# Patient Record
Sex: MALE | Race: WHITE | NOT HISPANIC OR LATINO | Employment: UNEMPLOYED | URBAN - METROPOLITAN AREA
[De-identification: names, ages, dates, MRNs, and addresses within clinical notes are randomized per-mention and may not be internally consistent; named-entity substitution may affect disease eponyms.]

---

## 2018-08-28 ENCOUNTER — OFFICE VISIT (OUTPATIENT)
Dept: FAMILY MEDICINE CLINIC | Facility: CLINIC | Age: 48
End: 2018-08-28

## 2018-08-28 VITALS
HEART RATE: 98 BPM | WEIGHT: 235 LBS | RESPIRATION RATE: 16 BRPM | SYSTOLIC BLOOD PRESSURE: 122 MMHG | DIASTOLIC BLOOD PRESSURE: 72 MMHG | TEMPERATURE: 97.6 F | OXYGEN SATURATION: 96 % | HEIGHT: 71 IN | BODY MASS INDEX: 32.9 KG/M2

## 2018-08-28 DIAGNOSIS — J06.9 VIRAL URI WITH COUGH: Primary | ICD-10-CM

## 2018-08-28 PROCEDURE — 99213 OFFICE O/P EST LOW 20 MIN: CPT | Performed by: NURSE PRACTITIONER

## 2018-08-28 RX ORDER — GUAIFENESIN 600 MG/1
1200 TABLET, EXTENDED RELEASE ORAL 2 TIMES DAILY PRN
COMMUNITY
Start: 2018-08-28 | End: 2019-03-06

## 2018-08-28 NOTE — PROGRESS NOTES
"Chief Complaint   Patient presents with   • Shortness of Breath   • Wheezing   • Cough       Upper Respiratory Infection: Patient complains of symptoms of a URI. Symptoms include congestion and cough. Onset of symptoms was 2 days ago, gradually worsening since that time. He also c/o achiness and no  fever for the past 2 days .  He is drinking plenty of fluids. Evaluation to date: none. Treatment to date: none.  Ill contacts at home or school or work discussed.    The following portions of the patient's history were reviewed and updated as appropriate: allergies, current medications, past family history, past medical history, past social history, past surgical history and problem list.    A comprehensive review of systems was negative except for: Respiratory: positive for cough and wheezing    Vitals:    08/28/18 1419   BP: 122/72   BP Location: Left arm   Patient Position: Sitting   Cuff Size: Large Adult   Pulse: 98   Resp: 16   Temp: 97.6 °F (36.4 °C)   SpO2: 96%   Weight: 107 kg (235 lb)   Height: 180.3 cm (71\")     Gen: Mildly ill appearing, alert  Ears: TM's normal without redness  Nose:  No congestion  Throat:  Pink without exudate, some drainage, tonsils okay  Neck: No LAD  Lung: Good air movement, regular RR  Heart: RR without murmur  Skin: No rash      Assessment/Plan   Simone was seen today for shortness of breath, wheezing and cough.    Diagnoses and all orders for this visit:    Viral URI with cough    Other orders  -     guaiFENesin (MUCINEX) 600 MG 12 hr tablet; Take 2 tablets by mouth 2 (Two) Times a Day As Needed for Cough or Congestion.             Patient Instructions   Upper Respiratory Infection, Adult  Most upper respiratory infections (URIs) are caused by a virus. A URI affects the nose, throat, and upper air passages. The most common type of URI is often called \"the common cold.\"  Follow these instructions at home:  · Take medicines only as told by your doctor.  · Gargle warm saltwater or " take cough drops to comfort your throat as told by your doctor.  · Use a warm mist humidifier or inhale steam from a shower to increase air moisture. This may make it easier to breathe.  · Drink enough fluid to keep your pee (urine) clear or pale yellow.  · Eat soups and other clear broths.  · Have a healthy diet.  · Rest as needed.  · Go back to work when your fever is gone or your doctor says it is okay.  ? You may need to stay home longer to avoid giving your URI to others.  ? You can also wear a face mask and wash your hands often to prevent spread of the virus.  · Use your inhaler more if you have asthma.  · Do not use any tobacco products, including cigarettes, chewing tobacco, or electronic cigarettes. If you need help quitting, ask your doctor.  Contact a doctor if:  · You are getting worse, not better.  · Your symptoms are not helped by medicine.  · You have chills.  · You are getting more short of breath.  · You have brown or red mucus.  · You have yellow or brown discharge from your nose.  · You have pain in your face, especially when you bend forward.  · You have a fever.  · You have puffy (swollen) neck glands.  · You have pain while swallowing.  · You have white areas in the back of your throat.  Get help right away if:  · You have very bad or constant:  ? Headache.  ? Ear pain.  ? Pain in your forehead, behind your eyes, and over your cheekbones (sinus pain).  ? Chest pain.  · You have long-lasting (chronic) lung disease and any of the following:  ? Wheezing.  ? Long-lasting cough.  ? Coughing up blood.  ? A change in your usual mucus.  · You have a stiff neck.  · You have changes in your:  ? Vision.  ? Hearing.  ? Thinking.  ? Mood.  This information is not intended to replace advice given to you by your health care provider. Make sure you discuss any questions you have with your health care provider.  Document Released: 06/05/2009 Document Revised: 08/20/2017 Document Reviewed: 03/25/2015  [employer]  Interactive Patient Education © 2018 [employer]        Tylenol or Advil as needed for pain, fever, muscle aches  Plenty of fluids  Hand washing discussed  Off work or school note given if needed.  Warm tea for throat.  Pros and cons of antibiotic use discussed    KAT Trammell  Family Practice  [facility]

## 2018-08-28 NOTE — PATIENT INSTRUCTIONS

## 2019-03-06 ENCOUNTER — APPOINTMENT (OUTPATIENT)
Dept: GENERAL RADIOLOGY | Facility: HOSPITAL | Age: 49
End: 2019-03-06

## 2019-03-06 ENCOUNTER — HOSPITAL ENCOUNTER (EMERGENCY)
Facility: HOSPITAL | Age: 49
Discharge: HOME | End: 2019-03-06
Attending: EMERGENCY MEDICINE | Admitting: EMERGENCY MEDICINE

## 2019-03-06 VITALS
DIASTOLIC BLOOD PRESSURE: 92 MMHG | TEMPERATURE: 97.5 F | BODY MASS INDEX: 32.62 KG/M2 | OXYGEN SATURATION: 96 % | HEIGHT: 71 IN | HEART RATE: 98 BPM | WEIGHT: 233 LBS | RESPIRATION RATE: 16 BRPM | SYSTOLIC BLOOD PRESSURE: 153 MMHG

## 2019-03-06 DIAGNOSIS — R79.89 ELEVATED LFTS: ICD-10-CM

## 2019-03-06 DIAGNOSIS — F41.0 ANXIETY ATTACK: Primary | ICD-10-CM

## 2019-03-06 LAB
ALBUMIN SERPL-MCNC: 3.5 G/DL (ref 3.5–5.2)
ALBUMIN/GLOB SERPL: 0.9 G/DL
ALP SERPL-CCNC: 188 U/L (ref 40–129)
ALT SERPL W P-5'-P-CCNC: 49 U/L (ref 5–41)
ANION GAP SERPL CALCULATED.3IONS-SCNC: 14 MMOL/L
AST SERPL-CCNC: 120 U/L (ref 5–40)
BASOPHILS # BLD AUTO: 0.02 10*3/MM3 (ref 0–0.2)
BASOPHILS NFR BLD AUTO: 0.4 % (ref 0–1.5)
BILIRUB SERPL-MCNC: 1.9 MG/DL (ref 0.2–1.2)
BUN BLD-MCNC: 9 MG/DL (ref 6–20)
BUN/CREAT SERPL: 11.7 (ref 7–25)
CALCIUM SPEC-SCNC: 7.9 MG/DL (ref 8.6–10.5)
CHLORIDE SERPL-SCNC: 100 MMOL/L (ref 98–107)
CO2 SERPL-SCNC: 26 MMOL/L (ref 22–29)
CREAT BLD-MCNC: 0.77 MG/DL (ref 0.76–1.27)
DEPRECATED RDW RBC AUTO: 48.4 FL (ref 37–54)
EOSINOPHIL # BLD AUTO: 0.1 10*3/MM3 (ref 0–0.4)
EOSINOPHIL NFR BLD AUTO: 2.2 % (ref 0.3–6.2)
ERYTHROCYTE [DISTWIDTH] IN BLOOD BY AUTOMATED COUNT: 13.8 % (ref 12.3–15.4)
GFR SERPL CREATININE-BSD FRML MDRD: 107 ML/MIN/1.73
GLOBULIN UR ELPH-MCNC: 3.9 GM/DL
GLUCOSE BLD-MCNC: 103 MG/DL (ref 65–99)
HCT VFR BLD AUTO: 43.8 % (ref 37.5–51)
HGB BLD-MCNC: 15 G/DL (ref 13–17.7)
HOLD SPECIMEN: NORMAL
HOLD SPECIMEN: NORMAL
IMM GRANULOCYTES # BLD AUTO: 0.01 10*3/MM3 (ref 0–0.05)
IMM GRANULOCYTES NFR BLD AUTO: 0.2 % (ref 0–0.5)
LYMPHOCYTES # BLD AUTO: 2.83 10*3/MM3 (ref 0.7–3.1)
LYMPHOCYTES NFR BLD AUTO: 62.3 % (ref 19.6–45.3)
MCH RBC QN AUTO: 32.4 PG (ref 26.6–33)
MCHC RBC AUTO-ENTMCNC: 34.2 G/DL (ref 31.5–35.7)
MCV RBC AUTO: 94.6 FL (ref 79–97)
MONOCYTES # BLD AUTO: 0.39 10*3/MM3 (ref 0.1–0.9)
MONOCYTES NFR BLD AUTO: 8.6 % (ref 5–12)
NEUTROPHILS # BLD AUTO: 1.19 10*3/MM3 (ref 1.4–7)
NEUTROPHILS NFR BLD AUTO: 26.3 % (ref 42.7–76)
NRBC BLD AUTO-RTO: 0 /100 WBC (ref 0–0)
PLAT MORPH BLD: NORMAL
PLATELET # BLD AUTO: 94 10*3/MM3 (ref 140–450)
PMV BLD AUTO: 9.2 FL (ref 6–12)
POTASSIUM BLD-SCNC: 3.8 MMOL/L (ref 3.5–5.2)
PROT SERPL-MCNC: 7.4 G/DL (ref 6–8.5)
RBC # BLD AUTO: 4.63 10*6/MM3 (ref 4.14–5.8)
RBC MORPH BLD: NORMAL
SODIUM BLD-SCNC: 140 MMOL/L (ref 136–145)
TROPONIN T SERPL-MCNC: <0.01 NG/ML (ref 0–0.03)
TROPONIN T SERPL-MCNC: <0.01 NG/ML (ref 0–0.03)
WBC MORPH BLD: NORMAL
WBC NRBC COR # BLD: 4.54 10*3/MM3 (ref 3.4–10.8)
WHOLE BLOOD HOLD SPECIMEN: NORMAL
WHOLE BLOOD HOLD SPECIMEN: NORMAL

## 2019-03-06 PROCEDURE — 25010000002 LORAZEPAM PER 2 MG: Performed by: EMERGENCY MEDICINE

## 2019-03-06 PROCEDURE — 96374 THER/PROPH/DIAG INJ IV PUSH: CPT

## 2019-03-06 PROCEDURE — 85025 COMPLETE CBC W/AUTO DIFF WBC: CPT | Performed by: EMERGENCY MEDICINE

## 2019-03-06 PROCEDURE — 99284 EMERGENCY DEPT VISIT MOD MDM: CPT | Performed by: EMERGENCY MEDICINE

## 2019-03-06 PROCEDURE — 99284 EMERGENCY DEPT VISIT MOD MDM: CPT

## 2019-03-06 PROCEDURE — 93005 ELECTROCARDIOGRAM TRACING: CPT | Performed by: EMERGENCY MEDICINE

## 2019-03-06 PROCEDURE — 84484 ASSAY OF TROPONIN QUANT: CPT | Performed by: EMERGENCY MEDICINE

## 2019-03-06 PROCEDURE — 71046 X-RAY EXAM CHEST 2 VIEWS: CPT

## 2019-03-06 PROCEDURE — 80053 COMPREHEN METABOLIC PANEL: CPT | Performed by: EMERGENCY MEDICINE

## 2019-03-06 PROCEDURE — 93010 ELECTROCARDIOGRAM REPORT: CPT | Performed by: INTERNAL MEDICINE

## 2019-03-06 PROCEDURE — 85007 BL SMEAR W/DIFF WBC COUNT: CPT | Performed by: EMERGENCY MEDICINE

## 2019-03-06 RX ORDER — SODIUM CHLORIDE 0.9 % (FLUSH) 0.9 %
10 SYRINGE (ML) INJECTION AS NEEDED
Status: DISCONTINUED | OUTPATIENT
Start: 2019-03-06 | End: 2019-03-06 | Stop reason: HOSPADM

## 2019-03-06 RX ORDER — LORAZEPAM 2 MG/ML
1 INJECTION INTRAMUSCULAR ONCE
Status: COMPLETED | OUTPATIENT
Start: 2019-03-06 | End: 2019-03-06

## 2019-03-06 RX ORDER — HYDROXYZINE PAMOATE 50 MG/1
50 CAPSULE ORAL 4 TIMES DAILY PRN
Qty: 20 CAPSULE | Refills: 0 | Status: SHIPPED | OUTPATIENT
Start: 2019-03-06 | End: 2020-10-08

## 2019-03-06 RX ADMIN — LORAZEPAM 1 MG: 2 INJECTION INTRAMUSCULAR; INTRAVENOUS at 18:23

## 2019-03-06 NOTE — ED PROVIDER NOTES
Subjective   Mr. Simone Boykin is a 47 yo WM who presents secondary to shortness of breath.  Patient is a poor historian.  He has trouble getting specifics as to when he symptoms started.  He has been short of breath for several days perhaps more.  However symptoms worsened today.  Patient also complains of pain in his left jaw and left shoulder and numbness in his left arm.  Patient has had similar symptoms several times in the past.  He has been seen by physicians.  No etiology has been found.  Patient has chronic left shoulder pain due to a torn rotator cuff.  He states he should have had surgery 20 years ago.  Patient was seen by chiropractor 5 days ago for this left shoulder pain.  Patient presents for evaluation.    Patient does not smoke.  No history of hypertension, hypercholesterolemia or diabetes.  Patient does not drink alcohol or use drugs.        History provided by:  Patient (Mother provides some history.)  Shortness of Breath   Severity:  Moderate  Onset quality:  Unable to specify  Duration: Unable to specify but several days or longer.  Timing:  Constant  Progression:  Worsening  Chronicity:  Recurrent  Context comment:  As described above.  Relieved by:  None tried  Worsened by:  Nothing  Ineffective treatments:  None tried  Associated symptoms: no abdominal pain, no chest pain, no claudication, no cough, no diaphoresis, no ear pain, no fever, no headaches, no hemoptysis, no neck pain, no PND, no rash, no sore throat, no sputum production, no syncope, no swollen glands, no vomiting and no wheezing    Associated symptoms comment:  Patient denied chest pain to me.  Risk factors: obesity    Risk factors: no hx of cancer, no hx of PE/DVT, no recent surgery and no tobacco use        Review of Systems   Constitutional: Negative for diaphoresis and fever.   HENT: Negative for ear pain and sore throat.    Respiratory: Positive for shortness of breath. Negative for cough, hemoptysis, sputum production  and wheezing.    Cardiovascular: Negative for chest pain, claudication, syncope and PND.   Gastrointestinal: Negative for abdominal pain and vomiting.   Genitourinary: Negative for difficulty urinating.   Musculoskeletal: Positive for arthralgias (chronic shoulder pain). Negative for neck pain.   Skin: Negative for rash.   Neurological: Negative for headaches.   Psychiatric/Behavioral: The patient is nervous/anxious.        Past Medical History:   Diagnosis Date   • Anxiety    • Arthritis     JUAN KNEES   • Depression    • Heartburn     ON OCC   • Mass     BASE OF POSTERIOR NECK   • Shoulder dislocation     LONG TERM ISSUES WITH       No Known Allergies    Past Surgical History:   Procedure Laterality Date   • EXCISION MASS HEAD/NECK N/A 4/20/2016    Procedure: MASS SOFT TISSUE EXCISION POSTERIOR NECK;  Surgeon: Crow Ayala Jr., MD;  Location: [facility] OR;  Service:    • KNEE ARTHROSCOPY Right        Family History   Problem Relation Age of Onset   • Heart disease Father    • Thyroid disease Brother        Social History     Socioeconomic History   • Marital status: Single     Spouse name: Not on file   • Number of children: Not on file   • Years of education: Not on file   • Highest education level: Not on file   Tobacco Use   • Smoking status: Never Smoker   • Smokeless tobacco: Never Used   Substance and Sexual Activity   • Alcohol use: Yes     Comment: OCCASIONALY   • Drug use: No   • Sexual activity: Defer           Objective   Physical Exam   Constitutional: He is oriented to person, place, and time. He appears well-developed and well-nourished. No distress.   49 yo WM lying in bed. He is obese but appears in good overall health otherwise. Vital signs notable for elevated BP of  164/95. Otherwise unremarkable.    HENT:   Head: Normocephalic and atraumatic.   Right Ear: External ear normal.   Left Ear: External ear normal.   Nose: Nose normal.   Mouth/Throat: Oropharynx is clear and moist.   Eyes:  Conjunctivae and EOM are normal. Pupils are equal, round, and reactive to light.   Neck: Normal range of motion. Neck supple.   Cardiovascular: Normal rate, regular rhythm, normal heart sounds and intact distal pulses. Exam reveals no gallop and no friction rub.   No murmur heard.  Pulmonary/Chest: Effort normal and breath sounds normal. No stridor. No respiratory distress. He has no wheezes. He has no rales.   Abdominal: Soft. He exhibits no distension. There is no tenderness.   Musculoskeletal: Normal range of motion. He exhibits no edema.   Neurological: He is alert and oriented to person, place, and time. He has normal reflexes. No cranial nerve deficit.   Skin: Skin is warm and dry. No rash noted. He is not diaphoretic. No erythema.   Psychiatric: His behavior is normal. His mood appears anxious.   Nursing note and vitals reviewed.      ECG 12 Lead    Date/Time: 3/6/2019 5:32 PM  Performed by: Paco Monte MD  Authorized by: Paco Monte MD   Interpreted by physician  Previous ECG: no previous ECG available  Rhythm: sinus rhythm  Rate: normal  QRS axis: normal  Conduction: 1st degree  T Waves: T waves normal  Other findings comments:   Seen V1 through V3.  Prolonged QT interval  Clinical impression: abnormal ECG    ECG 12 Lead    Date/Time: 3/6/2019 7:42 PM  Performed by: Paco Monte MD  Authorized by: Paco Monte MD   Interpreted by physician  Comparison: compared with previous ECG from 3/6/2019  Similar to previous ECG  Comparison to previous ECG: Compared to EKG at 1732. no significant change.  Rhythm: sinus rhythm  Rate: normal  QRS axis: normal  Conduction: 1st degree  ST Segments: ST segments normal  T Waves: T waves normal  Other findings comments: Borderline prolonged QT  Clinical impression: abnormal ECG                 ED Course  ED Course as of Mar 20 2257   Wed Mar 06, 2019   1750 Patient is a poor historian.  He has difficulty deciding when his symptoms began and how long  "they have been present.  Patient saw a chiropractor last week for left shoulder pain.  He states that he should have had surgery 20 years ago.  He cannot give any specifics as to the duration of his shortness of breath other than a while.  However it is worse today.  He has had prior episodes like this in the past.  He has been seen by physicians.  No etiology has been found.  Patient is very anxious.  I suspect anxiety is a component of his symptoms.  Obtaining chest x-ray and lab work.  EKG shows artifact present.  Will repeat EKG later in patient's ER course.  [SS]   1937 Patient feeling much better after Ativan.  Symptoms much improved.  CBC is unremarkable.  CMP notable for elevation of ALT, AST and alkaline phosphatase.  Troponin is negative.  Patient does not have any history of liver disease.  Levels are not alarmingly high.  Recommend patient follow-up with his PCP for further evaluation.  Patient states he drinks on weekends while \"watching the cats play\".  I will pain 2-hour repeat EKG and troponin.  [SS]      ED Course User Index  [SS] Paco Monte MD      Labs Reviewed   COMPREHENSIVE METABOLIC PANEL - Abnormal; Notable for the following components:       Result Value    Glucose 103 (*)     Calcium 7.9 (*)     ALT (SGPT) 49 (*)     AST (SGOT) 120 (*)     Alkaline Phosphatase 188 (*)     Total Bilirubin 1.9 (*)     All other components within normal limits   CBC WITH AUTO DIFFERENTIAL - Abnormal; Notable for the following components:    Platelets 94 (*)     Neutrophil % 26.3 (*)     Lymphocyte % 62.3 (*)     Neutrophils, Absolute 1.19 (*)     All other components within normal limits   TROPONIN (IN-HOUSE) - Normal    Narrative:     Troponin T Reference Range:  <= 0.03 ng/mL-   Negative for AMI  >0.03 ng/mL-     Abnormal for myocardial necrosis.  Clinicians would have to utilize clinical acumen, EKG, Troponin and serial changes to determine if it is an Acute Myocardial Infarction or myocardial " injury due to an underlying chronic condition.    SCAN SLIDE - Normal    Narrative:     No atypical or immature lymphocytes observed.   TROPONIN (IN-HOUSE) - Normal    Narrative:     Troponin T Reference Range:  <= 0.03 ng/mL-   Negative for AMI  >0.03 ng/mL-     Abnormal for myocardial necrosis.  Clinicians would have to utilize clinical acumen, EKG, Troponin and serial changes to determine if it is an Acute Myocardial Infarction or myocardial injury due to an underlying chronic condition.    RAINBOW DRAW    Narrative:     The following orders were created for panel order [facility] Draw.  Procedure                               Abnormality         Status                     ---------                               -----------         ------                     Light Blue Top[[id number]]                                   Final result               Green Top (Gel)[[id number]]                                  Final result               Lavender Top[[id number]]                                     Final result               Gold Top - SST[[id number]]                                   Final result                 Please view results for these tests on the individual orders.   CBC AND DIFFERENTIAL    Narrative:     The following orders were created for panel order CBC & Differential.  Procedure                               Abnormality         Status                     ---------                               -----------         ------                     Scan Slide[[id number]]                   Normal              Final result               CBC Auto Differential[[id number]]        Abnormal            Final result                 Please view results for these tests on the individual orders.   LIGHT BLUE TOP   GREEN TOP   LAVENDER TOP   GOLD TOP - SST     My differential diagnosis for dyspnea includes but is not limited to:  Asthma, COPD, pneumonia, pulmonary embolus, acute respiratory distress syndrome, pneumothorax, pleural  effusion, pulmonary fibrosis, congestive heart failure, myocardial infarction, DKA, uremia, acidosis, sepsis, anemia, drug related, hyperventilation, CNS disease              MDM  Number of Diagnoses or Management Options  Anxiety attack: new and requires workup  Elevated LFTs: new and requires workup     Amount and/or Complexity of Data Reviewed  Clinical lab tests: reviewed and ordered  Tests in the radiology section of CPT®: reviewed and ordered  Independent visualization of images, tracings, or specimens: yes    Risk of Complications, Morbidity, and/or Mortality  Presenting problems: moderate  Diagnostic procedures: high  Management options: moderate    Patient Progress  Patient progress: improved        Final diagnoses:   Anxiety attack   Elevated LFTs            Paco Monte MD  03/06/19 2037       Paco Monte MD  03/20/19 2841

## 2019-03-07 NOTE — DISCHARGE INSTRUCTIONS
Medication as directed.  Today's lab work does reveal mild elevation of your liver function test.  Recommend you discontinue alcohol usage.  Follow-up with Liza Sapp in 1 week for repeat labs.  Follow-up with general surgery as above.  Return to ED for medical emergencies.

## 2019-07-12 ENCOUNTER — OFFICE VISIT (OUTPATIENT)
Dept: FAMILY MEDICINE CLINIC | Facility: CLINIC | Age: 49
End: 2019-07-12

## 2019-07-12 VITALS
HEART RATE: 117 BPM | DIASTOLIC BLOOD PRESSURE: 88 MMHG | WEIGHT: 223 LBS | SYSTOLIC BLOOD PRESSURE: 148 MMHG | RESPIRATION RATE: 16 BRPM | BODY MASS INDEX: 31.22 KG/M2 | HEIGHT: 71 IN | TEMPERATURE: 97.4 F | OXYGEN SATURATION: 96 %

## 2019-07-12 DIAGNOSIS — T14.8XXA HEMATOMA: ICD-10-CM

## 2019-07-12 DIAGNOSIS — M79.605 PAIN OF LEFT LOWER EXTREMITY: Primary | ICD-10-CM

## 2019-07-12 DIAGNOSIS — T14.90XA INJURY: ICD-10-CM

## 2019-07-12 DIAGNOSIS — M79.89 SWELLING OF LOWER LEG: ICD-10-CM

## 2019-07-12 PROCEDURE — 99213 OFFICE O/P EST LOW 20 MIN: CPT | Performed by: PHYSICIAN ASSISTANT

## 2019-07-12 RX ORDER — IBUPROFEN 800 MG/1
800 TABLET ORAL EVERY 6 HOURS PRN
Qty: 30 TABLET | Refills: 0 | Status: SHIPPED | OUTPATIENT
Start: 2019-07-12 | End: 2020-10-08

## 2019-07-12 NOTE — PROGRESS NOTES
"Subjective   Simone Boykin is a 48 y.o. male presents for   Chief Complaint   Patient presents with   • Leg Injury     lt shin       History of Present Illness     Simone is a 48 year old male who presents with left lower leg pain and swelling the past 5-6 days.  States he went to answer the phone on Sunday afternoon when his left leg collided with the elk footboard of bed.  States he has had swelling and pain in the left lateral aspect of lower leg since this time.  He has used ice maybe once or twice since the time of injury.  He has taken over-the-counter Advil a few times but not routinely.  States the area did bleed at the beginning.  Now he has some scab formation with swelling to the left side.  He has been walking with a limp.  States the \"phobia\" is painful.  His sleep has been restless due to pain.  He has not been taking any other medications are applying heat to the area.  His appetite has been normal.      The following portions of the patient's history were reviewed and updated as appropriate: allergies, current medications, past family history, past medical history, past social history, past surgical history and problem list.    Review of Systems   Constitutional: Negative.    HENT: Negative.    Eyes: Negative.    Respiratory: Negative.    Cardiovascular: Negative.    Gastrointestinal: Negative.    Endocrine: Negative.    Genitourinary: Negative.    Musculoskeletal: Negative.         Left lateral leg pain and swellng   Skin: Positive for wound.   Allergic/Immunologic: Negative.    Neurological: Negative.    Hematological: Negative.    Psychiatric/Behavioral: Positive for sleep disturbance.   All other systems reviewed and are negative.        Vitals:    07/12/19 1600   BP: 148/88   Pulse: 117   Resp: 16   Temp: 97.4 °F (36.3 °C)   SpO2: 96%   Weight: 101 kg (223 lb)   Height: 180.3 cm (71\")     Wt Readings from Last 3 Encounters:   07/12/19 101 kg (223 lb)   03/06/19 106 kg (233 lb) "   08/28/18 107 kg (235 lb)     BP Readings from Last 3 Encounters:   07/12/19 148/88   03/06/19 153/92   08/28/18 122/72     Social History     Socioeconomic History   • Marital status: Single     Spouse name: Not on file   • Number of children: Not on file   • Years of education: Not on file   • Highest education level: Not on file   Tobacco Use   • Smoking status: Never Smoker   • Smokeless tobacco: Never Used   Substance and Sexual Activity   • Alcohol use: Yes     Comment: OCCASIONALY   • Drug use: No   • Sexual activity: Defer       No Known Allergies    Body mass index is 31.1 kg/m².    Objective   Physical Exam   Constitutional: He is oriented to person, place, and time. Vital signs are normal. He appears well-developed and well-nourished.   Walking with a slight limp   Musculoskeletal:        Left lower leg: He exhibits tenderness, bony tenderness and swelling.        Legs:  Neurological: He is alert and oriented to person, place, and time. No sensory deficit.   Skin: Skin is warm, dry and intact. Capillary refill takes less than 2 seconds. Bruising noted.        Psychiatric: He has a normal mood and affect. His speech is normal and behavior is normal. Judgment and thought content normal. Cognition and memory are normal.       Assessment/Plan   Simone was seen today for leg injury.    Diagnoses and all orders for this visit:    Pain of left lower extremity  -     XR tibia fibula 2 vw left; Future  -     ibuprofen (ADVIL,MOTRIN) 800 MG tablet; Take 1 tablet by mouth Every 6 (Six) Hours As Needed for Mild Pain .    Swelling of lower leg  -     XR tibia fibula 2 vw left; Future  -     ibuprofen (ADVIL,MOTRIN) 800 MG tablet; Take 1 tablet by mouth Every 6 (Six) Hours As Needed for Mild Pain .    Hematoma  -     XR tibia fibula 2 vw left; Future  -     ibuprofen (ADVIL,MOTRIN) 800 MG tablet; Take 1 tablet by mouth Every 6 (Six) Hours As Needed for Mild Pain .    Injury  -     ibuprofen (ADVIL,MOTRIN) 800 MG  tablet; Take 1 tablet by mouth Every 6 (Six) Hours As Needed for Mild Pain .      Mr. Simone Boykin was seen in office today with new left lower leg pain swelling, hematoma suffered during an injury on Sunday, July 7, 2019 at home.  His left leg collided with the oak footboard of his bed while answering the telephone.  I have prescribed ibuprofen 800 mg to his pharmacy.  I have asked him to RICE the area and to be more consistent with taking the ibuprofen.  He will have an x-ray of left tibia and fibula at the [place] office for further evaluation..    [facility]  [address] [place] [id number]  Dept: [phone]  Dept Fax: [phone]  Loc: [phone]  Loc Fax: [phone]

## 2020-02-27 ENCOUNTER — APPOINTMENT (OUTPATIENT)
Dept: GENERAL RADIOLOGY | Facility: HOSPITAL | Age: 50
End: 2020-02-27

## 2020-02-27 ENCOUNTER — HOSPITAL ENCOUNTER (EMERGENCY)
Facility: HOSPITAL | Age: 50
Discharge: HOME | End: 2020-02-27
Attending: EMERGENCY MEDICINE | Admitting: EMERGENCY MEDICINE

## 2020-02-27 VITALS
RESPIRATION RATE: 16 BRPM | HEART RATE: 98 BPM | DIASTOLIC BLOOD PRESSURE: 90 MMHG | OXYGEN SATURATION: 96 % | WEIGHT: 243 LBS | TEMPERATURE: 98.1 F | BODY MASS INDEX: 33.89 KG/M2 | SYSTOLIC BLOOD PRESSURE: 154 MMHG

## 2020-02-27 DIAGNOSIS — K70.10 ALCOHOLIC HEPATITIS, UNSPECIFIED WHETHER ASCITES PRESENT: ICD-10-CM

## 2020-02-27 DIAGNOSIS — F10.920 ALCOHOLIC INTOXICATION WITHOUT COMPLICATION (HCC): Primary | ICD-10-CM

## 2020-02-27 DIAGNOSIS — E87.6 HYPOKALEMIA: ICD-10-CM

## 2020-02-27 LAB
ALBUMIN SERPL-MCNC: 3.8 G/DL (ref 3.5–5.2)
ALBUMIN/GLOB SERPL: 0.8 G/DL
ALP SERPL-CCNC: 181 U/L (ref 39–117)
ALT SERPL W P-5'-P-CCNC: 39 U/L (ref 1–41)
ANION GAP SERPL CALCULATED.3IONS-SCNC: 18.8 MMOL/L (ref 5–15)
AST SERPL-CCNC: 116 U/L (ref 1–40)
BASOPHILS # BLD AUTO: 0.03 10*3/MM3 (ref 0–0.2)
BASOPHILS NFR BLD AUTO: 0.7 % (ref 0–1.5)
BILIRUB SERPL-MCNC: 2.1 MG/DL (ref 0.2–1.2)
BUN BLD-MCNC: 10 MG/DL (ref 6–20)
BUN/CREAT SERPL: 12.3 (ref 7–25)
CALCIUM SPEC-SCNC: 8.4 MG/DL (ref 8.6–10.5)
CHLORIDE SERPL-SCNC: 100 MMOL/L (ref 98–107)
CO2 SERPL-SCNC: 23.2 MMOL/L (ref 22–29)
CREAT BLD-MCNC: 0.81 MG/DL (ref 0.76–1.27)
DEPRECATED RDW RBC AUTO: 53.7 FL (ref 37–54)
EOSINOPHIL # BLD AUTO: 0.09 10*3/MM3 (ref 0–0.4)
EOSINOPHIL NFR BLD AUTO: 2.2 % (ref 0.3–6.2)
ERYTHROCYTE [DISTWIDTH] IN BLOOD BY AUTOMATED COUNT: 15.2 % (ref 12.3–15.4)
ETHANOL BLD-MCNC: 457 MG/DL (ref 0–10)
ETHANOL UR QL: 0.46 %
GFR SERPL CREATININE-BSD FRML MDRD: 101 ML/MIN/1.73
GLOBULIN UR ELPH-MCNC: 4.8 GM/DL
GLUCOSE BLD-MCNC: 114 MG/DL (ref 65–99)
HCT VFR BLD AUTO: 49.6 % (ref 37.5–51)
HGB BLD-MCNC: 17 G/DL (ref 13–17.7)
IMM GRANULOCYTES # BLD AUTO: 0.01 10*3/MM3 (ref 0–0.05)
IMM GRANULOCYTES NFR BLD AUTO: 0.2 % (ref 0–0.5)
LYMPHOCYTES # BLD AUTO: 2.26 10*3/MM3 (ref 0.7–3.1)
LYMPHOCYTES NFR BLD AUTO: 55 % (ref 19.6–45.3)
MCH RBC QN AUTO: 32.6 PG (ref 26.6–33)
MCHC RBC AUTO-ENTMCNC: 34.3 G/DL (ref 31.5–35.7)
MCV RBC AUTO: 95.2 FL (ref 79–97)
MONOCYTES # BLD AUTO: 0.46 10*3/MM3 (ref 0.1–0.9)
MONOCYTES NFR BLD AUTO: 11.2 % (ref 5–12)
NEUTROPHILS # BLD AUTO: 1.26 10*3/MM3 (ref 1.7–7)
NEUTROPHILS NFR BLD AUTO: 30.7 % (ref 42.7–76)
NRBC BLD AUTO-RTO: 0 /100 WBC (ref 0–0.2)
PLATELET # BLD AUTO: 75 10*3/MM3 (ref 140–450)
PMV BLD AUTO: 9.6 FL (ref 6–12)
POTASSIUM BLD-SCNC: 3.3 MMOL/L (ref 3.5–5.2)
PROT SERPL-MCNC: 8.6 G/DL (ref 6–8.5)
RBC # BLD AUTO: 5.21 10*6/MM3 (ref 4.14–5.8)
SODIUM BLD-SCNC: 142 MMOL/L (ref 136–145)
TROPONIN T SERPL-MCNC: <0.01 NG/ML (ref 0–0.03)
WBC NRBC COR # BLD: 4.11 10*3/MM3 (ref 3.4–10.8)

## 2020-02-27 PROCEDURE — 84484 ASSAY OF TROPONIN QUANT: CPT | Performed by: EMERGENCY MEDICINE

## 2020-02-27 PROCEDURE — 85025 COMPLETE CBC W/AUTO DIFF WBC: CPT | Performed by: EMERGENCY MEDICINE

## 2020-02-27 PROCEDURE — 71045 X-RAY EXAM CHEST 1 VIEW: CPT

## 2020-02-27 PROCEDURE — 80307 DRUG TEST PRSMV CHEM ANLYZR: CPT | Performed by: EMERGENCY MEDICINE

## 2020-02-27 PROCEDURE — 93005 ELECTROCARDIOGRAM TRACING: CPT | Performed by: EMERGENCY MEDICINE

## 2020-02-27 PROCEDURE — 99284 EMERGENCY DEPT VISIT MOD MDM: CPT

## 2020-02-27 PROCEDURE — 80053 COMPREHEN METABOLIC PANEL: CPT | Performed by: EMERGENCY MEDICINE

## 2020-02-27 PROCEDURE — 99284 EMERGENCY DEPT VISIT MOD MDM: CPT | Performed by: EMERGENCY MEDICINE

## 2020-02-27 RX ORDER — SODIUM CHLORIDE 0.9 % (FLUSH) 0.9 %
10 SYRINGE (ML) INJECTION AS NEEDED
Status: DISCONTINUED | OUTPATIENT
Start: 2020-02-27 | End: 2020-02-27 | Stop reason: HOSPADM

## 2020-02-27 RX ORDER — POTASSIUM CHLORIDE 750 MG/1
20 TABLET, FILM COATED, EXTENDED RELEASE ORAL DAILY
Qty: 14 TABLET | Refills: 0 | Status: SHIPPED | OUTPATIENT
Start: 2020-02-27 | End: 2020-03-05

## 2020-10-08 ENCOUNTER — APPOINTMENT (OUTPATIENT)
Dept: GENERAL RADIOLOGY | Facility: HOSPITAL | Age: 50
End: 2020-10-08

## 2020-10-08 ENCOUNTER — HOSPITAL ENCOUNTER (INPATIENT)
Facility: HOSPITAL | Age: 50
LOS: 11 days | Discharge: NURSING, REHAB OR LONG-TERM CARE | End: 2020-10-19
Attending: EMERGENCY MEDICINE | Admitting: HOSPITALIST

## 2020-10-08 DIAGNOSIS — A49.8 CLOSTRIDIOIDES DIFFICILE INFECTION: ICD-10-CM

## 2020-10-08 DIAGNOSIS — Z87.81 STATUS POST FRACTURE OF RIGHT HIP: ICD-10-CM

## 2020-10-08 DIAGNOSIS — S72.141A CLOSED COMMINUTED INTERTROCHANTERIC FRACTURE OF RIGHT FEMUR, INITIAL ENCOUNTER (HCC): Primary | ICD-10-CM

## 2020-10-08 DIAGNOSIS — F10.11 HISTORY OF ALCOHOL ABUSE: ICD-10-CM

## 2020-10-08 LAB
ABO GROUP BLD: NORMAL
ABO GROUP BLD: NORMAL
ALBUMIN SERPL-MCNC: 2.7 G/DL (ref 3.5–5.2)
ALBUMIN/GLOB SERPL: 0.8 G/DL
ALP SERPL-CCNC: 73 U/L (ref 39–117)
ALT SERPL W P-5'-P-CCNC: 18 U/L (ref 1–41)
AMPHET+METHAMPHET UR QL: NEGATIVE
AMPHETAMINES UR QL: NEGATIVE
ANION GAP SERPL CALCULATED.3IONS-SCNC: 9.8 MMOL/L (ref 5–15)
APTT PPP: 33.3 SECONDS (ref 24.3–38.1)
AST SERPL-CCNC: 28 U/L (ref 1–40)
BARBITURATES UR QL SCN: NEGATIVE
BASOPHILS # BLD AUTO: 0.02 10*3/MM3 (ref 0–0.2)
BASOPHILS NFR BLD AUTO: 0.4 % (ref 0–1.5)
BENZODIAZ UR QL SCN: POSITIVE
BILIRUB SERPL-MCNC: 1.2 MG/DL (ref 0–1.2)
BLD GP AB SCN SERPL QL: NEGATIVE
BUN SERPL-MCNC: 13 MG/DL (ref 6–20)
BUN/CREAT SERPL: 11.8 (ref 7–25)
BUPRENORPHINE SERPL-MCNC: NEGATIVE NG/ML
CALCIUM SPEC-SCNC: 9 MG/DL (ref 8.6–10.5)
CANNABINOIDS SERPL QL: NEGATIVE
CHLORIDE SERPL-SCNC: 104 MMOL/L (ref 98–107)
CK SERPL-CCNC: 73 U/L (ref 20–200)
CO2 SERPL-SCNC: 24.2 MMOL/L (ref 22–29)
COCAINE UR QL: NEGATIVE
CREAT SERPL-MCNC: 1.1 MG/DL (ref 0.76–1.27)
DEPRECATED RDW RBC AUTO: 54.1 FL (ref 37–54)
EOSINOPHIL # BLD AUTO: 0.09 10*3/MM3 (ref 0–0.4)
EOSINOPHIL NFR BLD AUTO: 1.7 % (ref 0.3–6.2)
ERYTHROCYTE [DISTWIDTH] IN BLOOD BY AUTOMATED COUNT: 14.8 % (ref 12.3–15.4)
ETHANOL BLD-MCNC: <10 MG/DL (ref 0–10)
ETHANOL UR QL: <0.01 %
FERRITIN SERPL-MCNC: 263 NG/ML (ref 30–400)
GFR SERPL CREATININE-BSD FRML MDRD: 71 ML/MIN/1.73
GLOBULIN UR ELPH-MCNC: 3.2 GM/DL
GLUCOSE SERPL-MCNC: 135 MG/DL (ref 65–99)
HBA1C MFR BLD: 4.89 % (ref 4.8–5.6)
HCT VFR BLD AUTO: 27.5 % (ref 37.5–51)
HGB BLD-MCNC: 8.7 G/DL (ref 13–17.7)
IMM GRANULOCYTES # BLD AUTO: 0.05 10*3/MM3 (ref 0–0.05)
IMM GRANULOCYTES NFR BLD AUTO: 1 % (ref 0–0.5)
INR PPP: 1.42 (ref 0.9–1.1)
IRON 24H UR-MRATE: 44 MCG/DL (ref 59–158)
IRON SATN MFR SERPL: 22 % (ref 20–50)
LYMPHOCYTES # BLD AUTO: 0.73 10*3/MM3 (ref 0.7–3.1)
LYMPHOCYTES NFR BLD AUTO: 13.9 % (ref 19.6–45.3)
MCH RBC QN AUTO: 31.4 PG (ref 26.6–33)
MCHC RBC AUTO-ENTMCNC: 31.6 G/DL (ref 31.5–35.7)
MCV RBC AUTO: 99.3 FL (ref 79–97)
METHADONE UR QL SCN: NEGATIVE
MONOCYTES # BLD AUTO: 0.66 10*3/MM3 (ref 0.1–0.9)
MONOCYTES NFR BLD AUTO: 12.6 % (ref 5–12)
NEUTROPHILS NFR BLD AUTO: 3.7 10*3/MM3 (ref 1.7–7)
NEUTROPHILS NFR BLD AUTO: 70.4 % (ref 42.7–76)
NRBC BLD AUTO-RTO: 0 /100 WBC (ref 0–0.2)
OPIATES UR QL: NEGATIVE
OXYCODONE UR QL SCN: NEGATIVE
PCP UR QL SCN: NEGATIVE
PLATELET # BLD AUTO: 114 10*3/MM3 (ref 140–450)
PMV BLD AUTO: 10 FL (ref 6–12)
POTASSIUM SERPL-SCNC: 4.2 MMOL/L (ref 3.5–5.2)
PROPOXYPH UR QL: NEGATIVE
PROT SERPL-MCNC: 5.9 G/DL (ref 6–8.5)
PROTHROMBIN TIME: 16.9 SECONDS (ref 12.1–15)
RBC # BLD AUTO: 2.77 10*6/MM3 (ref 4.14–5.8)
RH BLD: POSITIVE
RH BLD: POSITIVE
SODIUM SERPL-SCNC: 138 MMOL/L (ref 136–145)
T&S EXPIRATION DATE: NORMAL
TIBC SERPL-MCNC: 204 MCG/DL (ref 298–536)
TRICYCLICS UR QL SCN: NEGATIVE
TSH SERPL DL<=0.05 MIU/L-ACNC: 0.84 UIU/ML (ref 0.27–4.2)
UIBC SERPL-MCNC: 160 MCG/DL (ref 112–346)
WBC # BLD AUTO: 5.25 10*3/MM3 (ref 3.4–10.8)

## 2020-10-08 PROCEDURE — 83550 IRON BINDING TEST: CPT | Performed by: NURSE PRACTITIONER

## 2020-10-08 PROCEDURE — 85025 COMPLETE CBC W/AUTO DIFF WBC: CPT | Performed by: PHYSICIAN ASSISTANT

## 2020-10-08 PROCEDURE — 80053 COMPREHEN METABOLIC PANEL: CPT | Performed by: PHYSICIAN ASSISTANT

## 2020-10-08 PROCEDURE — 99285 EMERGENCY DEPT VISIT HI MDM: CPT

## 2020-10-08 PROCEDURE — 80307 DRUG TEST PRSMV CHEM ANLYZR: CPT | Performed by: PHYSICIAN ASSISTANT

## 2020-10-08 PROCEDURE — 86900 BLOOD TYPING SEROLOGIC ABO: CPT | Performed by: PHYSICIAN ASSISTANT

## 2020-10-08 PROCEDURE — 25010000002 FENTANYL CITRATE (PF) 100 MCG/2ML SOLUTION: Performed by: EMERGENCY MEDICINE

## 2020-10-08 PROCEDURE — 85730 THROMBOPLASTIN TIME PARTIAL: CPT | Performed by: PHYSICIAN ASSISTANT

## 2020-10-08 PROCEDURE — 82550 ASSAY OF CK (CPK): CPT | Performed by: NURSE PRACTITIONER

## 2020-10-08 PROCEDURE — 71045 X-RAY EXAM CHEST 1 VIEW: CPT

## 2020-10-08 PROCEDURE — 83036 HEMOGLOBIN GLYCOSYLATED A1C: CPT | Performed by: NURSE PRACTITIONER

## 2020-10-08 PROCEDURE — 25010000002 MORPHINE PER 10 MG: Performed by: NURSE PRACTITIONER

## 2020-10-08 PROCEDURE — 99223 1ST HOSP IP/OBS HIGH 75: CPT | Performed by: NURSE PRACTITIONER

## 2020-10-08 PROCEDURE — 86901 BLOOD TYPING SEROLOGIC RH(D): CPT | Performed by: PHYSICIAN ASSISTANT

## 2020-10-08 PROCEDURE — 73502 X-RAY EXAM HIP UNI 2-3 VIEWS: CPT

## 2020-10-08 PROCEDURE — 84443 ASSAY THYROID STIM HORMONE: CPT | Performed by: NURSE PRACTITIONER

## 2020-10-08 PROCEDURE — 86901 BLOOD TYPING SEROLOGIC RH(D): CPT

## 2020-10-08 PROCEDURE — 93010 ELECTROCARDIOGRAM REPORT: CPT | Performed by: INTERNAL MEDICINE

## 2020-10-08 PROCEDURE — 99284 EMERGENCY DEPT VISIT MOD MDM: CPT | Performed by: PHYSICIAN ASSISTANT

## 2020-10-08 PROCEDURE — 86900 BLOOD TYPING SEROLOGIC ABO: CPT

## 2020-10-08 PROCEDURE — 93005 ELECTROCARDIOGRAM TRACING: CPT | Performed by: PHYSICIAN ASSISTANT

## 2020-10-08 PROCEDURE — 73552 X-RAY EXAM OF FEMUR 2/>: CPT

## 2020-10-08 PROCEDURE — 25010000002 THIAMINE PER 100 MG: Performed by: NURSE PRACTITIONER

## 2020-10-08 PROCEDURE — 86923 COMPATIBILITY TEST ELECTRIC: CPT

## 2020-10-08 PROCEDURE — 83540 ASSAY OF IRON: CPT | Performed by: NURSE PRACTITIONER

## 2020-10-08 PROCEDURE — 25010000002 MORPHINE PER 10 MG: Performed by: ORTHOPAEDIC SURGERY

## 2020-10-08 PROCEDURE — 25010000002 THIAMINE PER 100 MG: Performed by: PHYSICIAN ASSISTANT

## 2020-10-08 PROCEDURE — 86850 RBC ANTIBODY SCREEN: CPT | Performed by: PHYSICIAN ASSISTANT

## 2020-10-08 PROCEDURE — 85610 PROTHROMBIN TIME: CPT | Performed by: PHYSICIAN ASSISTANT

## 2020-10-08 PROCEDURE — 0202U NFCT DS 22 TRGT SARS-COV-2: CPT | Performed by: NURSE PRACTITIONER

## 2020-10-08 PROCEDURE — 82728 ASSAY OF FERRITIN: CPT | Performed by: NURSE PRACTITIONER

## 2020-10-08 RX ORDER — VANCOMYCIN HYDROCHLORIDE 125 MG/1
125 CAPSULE ORAL 4 TIMES DAILY
Status: DISPENSED | OUTPATIENT
Start: 2020-10-08 | End: 2020-10-14

## 2020-10-08 RX ORDER — SODIUM CHLORIDE 9 MG/ML
40 INJECTION, SOLUTION INTRAVENOUS AS NEEDED
Status: DISCONTINUED | OUTPATIENT
Start: 2020-10-08 | End: 2020-10-19 | Stop reason: HOSPADM

## 2020-10-08 RX ORDER — CHLORDIAZEPOXIDE HYDROCHLORIDE 5 MG/1
10 CAPSULE, GELATIN COATED ORAL EVERY 6 HOURS SCHEDULED
Status: DISPENSED | OUTPATIENT
Start: 2020-10-09 | End: 2020-10-15

## 2020-10-08 RX ORDER — THIAMINE HYDROCHLORIDE 100 MG/ML
100 INJECTION, SOLUTION INTRAMUSCULAR; INTRAVENOUS ONCE
Status: COMPLETED | OUTPATIENT
Start: 2020-10-08 | End: 2020-10-08

## 2020-10-08 RX ORDER — ONDANSETRON 4 MG/1
4 TABLET, FILM COATED ORAL EVERY 6 HOURS PRN
Status: DISCONTINUED | OUTPATIENT
Start: 2020-10-08 | End: 2020-10-19 | Stop reason: HOSPADM

## 2020-10-08 RX ORDER — ACETAMINOPHEN 325 MG/1
650 TABLET ORAL EVERY 4 HOURS PRN
Status: DISCONTINUED | OUTPATIENT
Start: 2020-10-08 | End: 2020-10-10

## 2020-10-08 RX ORDER — LORAZEPAM 2 MG/ML
2 INJECTION INTRAMUSCULAR
Status: ACTIVE | OUTPATIENT
Start: 2020-10-08 | End: 2020-10-15

## 2020-10-08 RX ORDER — HYDROCODONE BITARTRATE AND ACETAMINOPHEN 5; 325 MG/1; MG/1
1 TABLET ORAL EVERY 4 HOURS PRN
Status: DISCONTINUED | OUTPATIENT
Start: 2020-10-08 | End: 2020-10-10

## 2020-10-08 RX ORDER — FOLIC ACID 5 MG/ML
INJECTION, SOLUTION INTRAMUSCULAR; INTRAVENOUS; SUBCUTANEOUS
Status: DISPENSED
Start: 2020-10-08 | End: 2020-10-09

## 2020-10-08 RX ORDER — CHOLECALCIFEROL (VITAMIN D3) 125 MCG
5 CAPSULE ORAL NIGHTLY PRN
Status: DISCONTINUED | OUTPATIENT
Start: 2020-10-08 | End: 2020-10-19 | Stop reason: HOSPADM

## 2020-10-08 RX ORDER — RETINOL, ERGOCALCIFEROL, .ALPHA.-TOCOPHEROL ACETATE, DL-, PHYTONADIONE, ASCORBIC ACID, NIACINAMIDE, RIBOFLAVIN 5-PHOSPHATE SODIUM, THIAMINE HYDROCHLORIDE, PYRIDOXINE HYDROCHLORIDE, DEXPANTHENOL, BIOTIN, FOLIC ACID, AND CYANOCOBALAMIN 200-150/10
KIT INTRAVENOUS
Status: DISPENSED
Start: 2020-10-08 | End: 2020-10-09

## 2020-10-08 RX ORDER — MULTIPLE VITAMINS W/ MINERALS TAB 9MG-400MCG
1 TAB ORAL DAILY
Status: DISCONTINUED | OUTPATIENT
Start: 2020-10-09 | End: 2020-10-19 | Stop reason: HOSPADM

## 2020-10-08 RX ORDER — SODIUM CHLORIDE 0.9 % (FLUSH) 0.9 %
10 SYRINGE (ML) INJECTION AS NEEDED
Status: DISCONTINUED | OUTPATIENT
Start: 2020-10-08 | End: 2020-10-19 | Stop reason: HOSPADM

## 2020-10-08 RX ORDER — VANCOMYCIN HYDROCHLORIDE 125 MG/1
125 CAPSULE ORAL 4 TIMES DAILY
COMMUNITY
Start: 2020-10-08 | End: 2020-10-19 | Stop reason: HOSPADM

## 2020-10-08 RX ORDER — BISACODYL 10 MG
10 SUPPOSITORY, RECTAL RECTAL DAILY PRN
Status: DISCONTINUED | OUTPATIENT
Start: 2020-10-08 | End: 2020-10-19 | Stop reason: HOSPADM

## 2020-10-08 RX ORDER — SODIUM CHLORIDE 0.9 % (FLUSH) 0.9 %
10 SYRINGE (ML) INJECTION EVERY 12 HOURS SCHEDULED
Status: DISCONTINUED | OUTPATIENT
Start: 2020-10-08 | End: 2020-10-19 | Stop reason: HOSPADM

## 2020-10-08 RX ORDER — BISACODYL 5 MG/1
5 TABLET, DELAYED RELEASE ORAL DAILY PRN
Status: DISCONTINUED | OUTPATIENT
Start: 2020-10-08 | End: 2020-10-19 | Stop reason: HOSPADM

## 2020-10-08 RX ORDER — FOLIC ACID 1 MG/1
1 TABLET ORAL DAILY
COMMUNITY
Start: 2020-10-09

## 2020-10-08 RX ORDER — NALOXONE HCL 0.4 MG/ML
0.4 VIAL (ML) INJECTION
Status: DISCONTINUED | OUTPATIENT
Start: 2020-10-08 | End: 2020-10-19 | Stop reason: HOSPADM

## 2020-10-08 RX ORDER — LORAZEPAM 1 MG/1
1 TABLET ORAL
Status: DISPENSED | OUTPATIENT
Start: 2020-10-08 | End: 2020-10-15

## 2020-10-08 RX ORDER — LANOLIN ALCOHOL/MO/W.PET/CERES
400 CREAM (GRAM) TOPICAL DAILY
COMMUNITY
Start: 2020-10-09 | End: 2020-10-27 | Stop reason: SDUPTHER

## 2020-10-08 RX ORDER — OXYCODONE HYDROCHLORIDE AND ACETAMINOPHEN 5; 325 MG/1; MG/1
1 TABLET ORAL ONCE
Status: COMPLETED | OUTPATIENT
Start: 2020-10-08 | End: 2020-10-08

## 2020-10-08 RX ORDER — NITROGLYCERIN 0.4 MG/1
0.4 TABLET SUBLINGUAL
Status: DISCONTINUED | OUTPATIENT
Start: 2020-10-08 | End: 2020-10-19 | Stop reason: HOSPADM

## 2020-10-08 RX ORDER — ACETAMINOPHEN 160 MG/5ML
650 SOLUTION ORAL EVERY 4 HOURS PRN
Status: DISCONTINUED | OUTPATIENT
Start: 2020-10-08 | End: 2020-10-10

## 2020-10-08 RX ORDER — THIAMINE HYDROCHLORIDE 100 MG/ML
INJECTION, SOLUTION INTRAMUSCULAR; INTRAVENOUS
Status: DISPENSED
Start: 2020-10-08 | End: 2020-10-09

## 2020-10-08 RX ORDER — LORAZEPAM 2 MG/ML
1 INJECTION INTRAMUSCULAR
Status: ACTIVE | OUTPATIENT
Start: 2020-10-08 | End: 2020-10-15

## 2020-10-08 RX ORDER — ONDANSETRON 2 MG/ML
4 INJECTION INTRAMUSCULAR; INTRAVENOUS EVERY 6 HOURS PRN
Status: DISCONTINUED | OUTPATIENT
Start: 2020-10-08 | End: 2020-10-19 | Stop reason: HOSPADM

## 2020-10-08 RX ORDER — FAMOTIDINE 20 MG/1
40 TABLET, FILM COATED ORAL DAILY
Status: DISCONTINUED | OUTPATIENT
Start: 2020-10-08 | End: 2020-10-11

## 2020-10-08 RX ORDER — FOLIC ACID 1 MG/1
1 TABLET ORAL DAILY
Status: DISCONTINUED | OUTPATIENT
Start: 2020-10-09 | End: 2020-10-19 | Stop reason: HOSPADM

## 2020-10-08 RX ORDER — FENTANYL CITRATE 50 UG/ML
50 INJECTION, SOLUTION INTRAMUSCULAR; INTRAVENOUS ONCE
Status: COMPLETED | OUTPATIENT
Start: 2020-10-08 | End: 2020-10-08

## 2020-10-08 RX ORDER — ACETAMINOPHEN 650 MG/1
650 SUPPOSITORY RECTAL EVERY 4 HOURS PRN
Status: DISCONTINUED | OUTPATIENT
Start: 2020-10-08 | End: 2020-10-10

## 2020-10-08 RX ORDER — LANOLIN ALCOHOL/MO/W.PET/CERES
400 CREAM (GRAM) TOPICAL DAILY
Status: DISCONTINUED | OUTPATIENT
Start: 2020-10-09 | End: 2020-10-19 | Stop reason: HOSPADM

## 2020-10-08 RX ORDER — MULTIVIT AND MINERALS-FERROUS GLUCONATE 9 MG IRON/15 ML ORAL LIQUID 9 MG/15 ML
LIQUID (ML) ORAL DAILY
COMMUNITY

## 2020-10-08 RX ORDER — LORAZEPAM 1 MG/1
2 TABLET ORAL
Status: ACTIVE | OUTPATIENT
Start: 2020-10-08 | End: 2020-10-15

## 2020-10-08 RX ADMIN — HYDROCODONE BITARTRATE AND ACETAMINOPHEN 1 TABLET: 5; 325 TABLET ORAL at 23:22

## 2020-10-08 RX ADMIN — THIAMINE HYDROCHLORIDE 100 MG: 100 INJECTION, SOLUTION INTRAMUSCULAR; INTRAVENOUS at 16:40

## 2020-10-08 RX ADMIN — MORPHINE SULFATE 4 MG: 4 INJECTION INTRAVENOUS at 20:51

## 2020-10-08 RX ADMIN — FOLIC ACID 100 ML/HR: 5 INJECTION, SOLUTION INTRAMUSCULAR; INTRAVENOUS; SUBCUTANEOUS at 20:52

## 2020-10-08 RX ADMIN — SODIUM CHLORIDE, PRESERVATIVE FREE 10 ML: 5 INJECTION INTRAVENOUS at 20:53

## 2020-10-08 RX ADMIN — FAMOTIDINE 40 MG: 20 TABLET, FILM COATED ORAL at 20:51

## 2020-10-08 RX ADMIN — FENTANYL CITRATE 50 MCG: 50 INJECTION, SOLUTION INTRAMUSCULAR; INTRAVENOUS at 15:02

## 2020-10-08 RX ADMIN — CHLORDIAZEPOXIDE HYDROCHLORIDE 10 MG: 5 CAPSULE ORAL at 23:22

## 2020-10-08 RX ADMIN — VANCOMYCIN HYDROCHLORIDE 125 MG: 125 CAPSULE ORAL at 20:51

## 2020-10-08 RX ADMIN — OXYCODONE AND ACETAMINOPHEN 1 TABLET: 5; 325 TABLET ORAL at 16:43

## 2020-10-08 NOTE — ED NOTES
After looking at [facility] records, explained patient that he was treated for alcolhol withdrawal/fall at [facility] a week ago, stated to me that he had not drank in a year     Brittany Cifuentes RN  10/08/20 [id number]

## 2020-10-08 NOTE — PLAN OF CARE
Goal Outcome Evaluation:  Plan of Care Reviewed With: patient  Progress: no change  Outcome Summary: vss, new admit for right hip fx.  pain management in process.  surgical consult to determine plan.

## 2020-10-08 NOTE — ED PROVIDER NOTES
" EMERGENCY DEPARTMENT ENCOUNTER      Room Number: 4/04    History is provided by the patient, no translation services needed    HPI:    Chief complaint: Hip injury    Location: Right hip    Quality/Severity: 3/10    Timing/Duration: Injury occurred just prior to arrival today.    Modifying Factors: Patient was assisted by 3 men in the store, they helped sit him in a chair until EMS arrived, after fall he was unable to bear weight on right leg.     Associated Symptoms: Positive for right hip and right femur pain.  Patient denies any head injury or loss of consciousness.  Denies syncope, chest pain, shortness of air, back pain, neck pain.    Narrative: Pt is a 50 y.o. male who presents complaining of injury to right hip shortly prior to arrival today.  Patient states he was at [facility] pharmacy picking up some medications when he snagged his walker on carpet which caused him to fall onto his right side.  He states he was unable to get up by himself, and required help from 3 other people.  Patient states he was recently seen at [facility] for a fall on 9/28/2020, has been walking with a walker since.  He states he tripped over his dog on 9/28/2020 and hit his head which caused him to have \"concussion seizures,\" however upon chart review from [facility] it appears patient was having alcohol withdrawal seizures.  In addition to DTs, he was also diagnosed with esophagitis, melena, C. difficile, transaminitis, hypokalemia and PETER.  He was discharged 2 days ago on vancomycin taper, and Protonix.  Patient states he does not remember anybody telling him that he has C. difficile, he denies any alcohol withdrawal.  He initially stated he has not had any alcohol for a year, but does admit to drinking 2 pints of Guinness 2 weeks ago.  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  I asked him if his pedal edema is new and he states he has had " "pedal edema since he \"tore tendons\" in his ankles while playing rugby for [employer] as well.      PMD: Liza Sapp PA-C    REVIEW OF SYSTEMS  Review of Systems   Constitutional: Negative for chills and fever.   Respiratory: Negative for cough and shortness of breath.    Cardiovascular: Negative for chest pain and palpitations.   Gastrointestinal: Negative for abdominal pain, nausea and vomiting.   Genitourinary: Negative for difficulty urinating and dysuria.   Musculoskeletal: Positive for arthralgias and gait problem. Negative for back pain, myalgias and neck pain.   Skin: Negative for pallor and rash.   Neurological: Positive for tremors (Chronic). Negative for dizziness, syncope and headaches.   Psychiatric/Behavioral: Negative for confusion. The patient is not nervous/anxious.          PAST MEDICAL HISTORY  Active Ambulatory Problems     Diagnosis Date Noted   • Neck mass 03/23/2016   • Anxiety 03/23/2016   • Pain of left lower extremity 07/12/2019   • Swelling of lower leg 07/12/2019   • Hematoma 07/12/2019     Resolved Ambulatory Problems     Diagnosis Date Noted   • No Resolved Ambulatory Problems     Past Medical History:   Diagnosis Date   • Arthritis    • Depression    • Fall    • GERD (gastroesophageal reflux disease)    • Heartburn    • Mass    • Shoulder dislocation        PAST SURGICAL HISTORY  Past Surgical History:   Procedure Laterality Date   • EXCISION MASS HEAD/NECK N/A 4/20/2016    Procedure: MASS SOFT TISSUE EXCISION POSTERIOR NECK;  Surgeon: Crow Ayala Jr., MD;  Location: [facility];  Service:    • KNEE ARTHROSCOPY Right        FAMILY HISTORY  Family History   Problem Relation Age of Onset   • Heart disease Father    • Thyroid disease Brother        SOCIAL HISTORY  Social History     Socioeconomic History   • Marital status: Single     Spouse name: Not on file   • Number of children: Not on file   • Years of education: Not on file   • Highest education level: Not on file   Tobacco Use  "   • Smoking status: Never Smoker   • Smokeless tobacco: Never Used   Substance and Sexual Activity   • Alcohol use: Yes     Comment: states no longer drinks daily   • Drug use: No   • Sexual activity: Defer       ALLERGIES  Patient has no known allergies.      Current Facility-Administered Medications:   •  [COMPLETED] Insert peripheral IV, , , Once **AND** sodium chloride 0.9 % flush 10 mL, 10 mL, Intravenous, PRN, Loni Cantor PA-C  •  thiamine (B-1) injection 100 mg, 100 mg, Intravenous, Once, Loni Cantor PA-C    Current Outpatient Medications:   •  Multiple Vitamins-Minerals (multivitamin and minerals) liquid liquid, Take  by mouth Daily., Disp: , Rfl:     PHYSICAL EXAM  ED Triage Vitals [10/08/20 1321]   Temp Heart Rate Resp BP SpO2   98.1 °F (36.7 °C) 97 20 105/64 97 %      Temp src Heart Rate Source Patient Position BP Location FiO2 (%)   Oral Monitor Lying Right arm --       Physical Exam   Constitutional: He is oriented to person, place, and time and well-developed, well-nourished, and in no distress.   HENT:   Head: Normocephalic and atraumatic.   Eyes: Pupils are equal, round, and reactive to light. Conjunctivae are normal.   Patient does exhibit some horizontal nystagmus bilaterally, no gaze palsies.   Neck: Normal range of motion. Neck supple.   Cardiovascular: Normal rate, regular rhythm and intact distal pulses.   Pulmonary/Chest: Effort normal and breath sounds normal.   Abdominal: Soft. Bowel sounds are normal. There is no abdominal tenderness. There is no guarding.   Musculoskeletal:         General: Edema (3+ pitting pedal edema bilaterally.) present.      Right hip: He exhibits decreased range of motion and tenderness.   Neurological: He is alert and oriented to person, place, and time. GCS score is 15.   Patient is tremulous in upper extremities bilaterally   Skin: Skin is warm and dry.   Psychiatric: Mood, memory, affect and judgment normal.   Nursing note and vitals  reviewed.        LAB RESULTS  Lab Results (last 24 hours)     Procedure Component Value Units Date/Time    Urine Drug Screen - Urine, Clean Catch [[id number]]  (Abnormal) Collected: 10/08/20 1445    Specimen: Urine, Clean Catch Updated: 10/08/20 1501     THC, Screen, Urine Negative     Phencyclidine (PCP), Urine Negative     Cocaine Screen, Urine Negative     Methamphetamine, Ur Negative     Opiate Screen Negative     Amphetamine Screen, Urine Negative     Benzodiazepine Screen, Urine Positive     Tricyclic Antidepressants Screen Negative     Methadone Screen, Urine Negative     Barbiturates Screen, Urine Negative     Oxycodone Screen, Urine Negative     Propoxyphene Screen Negative     Buprenorphine, Screen, Urine Negative    Narrative:      Urine drug screen results are to be used for medical purposes only.  They are not to be used for legal purposes such as employment testing.  Negative results do not necessarily mean the complete absence of a subtance, but rather that the result is less than the cutoff for that substance.  Positive results are unconfirmed and considered Preliminary Positive.  [facility] does not automatically confirm Postitive Unconfirmed results.  The physician may request (order) an Unconfirmed Positive result to be sent out for confirmation.      Negative Thresholds for Drugs Screened:    THC screen, urine                          50 ng/ml  Phenycyclidine (PCP), urine                25 ng/ml  Cocaine screen, urine                     150 ng/ml  Methamphetamine, urine                    500 ng/ml  Opiate screen, urine                      100 ng/ml  Amphetamine screen, urine                 500 ng/ml  Benzodiazepine screen, urine              150 ng/ml  Tricyclic Antidepressants screen, urine   300 ng/ml  Methadone screen, urine                   200 ng/ml  Barbiturates screen, urine                200 ng/ml  Oxycodone screen, urine                   100 ng/ml  Propoxyphene  screen, urine                300 ng/ml  Buprenorphine screen, urine                10 ng/ml    CBC & Differential [[id number]]  (Abnormal) Collected: 10/08/20 1458    Specimen: Blood Updated: 10/08/20 1515    Narrative:      The following orders were created for panel order CBC & Differential.  Procedure                               Abnormality         Status                     ---------                               -----------         ------                     CBC Auto Differential[[id number]]        Abnormal            Final result                 Please view results for these tests on the individual orders.    Comprehensive Metabolic Panel [[id number]]  (Abnormal) Collected: 10/08/20 1458    Specimen: Blood Updated: 10/08/20 1539     Glucose 135 mg/dL      BUN 13 mg/dL      Creatinine 1.10 mg/dL      Sodium 138 mmol/L      Potassium 4.2 mmol/L      Chloride 104 mmol/L      CO2 24.2 mmol/L      Calcium 9.0 mg/dL      Total Protein 5.9 g/dL      Albumin 2.70 g/dL      ALT (SGPT) 18 U/L      AST (SGOT) 28 U/L      Alkaline Phosphatase 73 U/L      Total Bilirubin 1.2 mg/dL      eGFR Non African Amer 71 mL/min/1.73      Globulin 3.2 gm/dL      A/G Ratio 0.8 g/dL      BUN/Creatinine Ratio 11.8     Anion Gap 9.8 mmol/L     Narrative:      GFR Normal >60  Chronic Kidney Disease <60  Kidney Failure <15      Protime-INR [[id number]]  (Abnormal) Collected: 10/08/20 1458    Specimen: Blood Updated: 10/08/20 1528     Protime 16.9 Seconds      INR 1.42    Narrative:      Therapeutic Ranges for INR: 2.0-3.0 (PT 20-30)                              2.5-3.5 (PT 25-34)    aPTT [[id number]]  (Normal) Collected: 10/08/20 1458    Specimen: Blood Updated: 10/08/20 1528     PTT 33.3 seconds     Narrative:      PTT = The equivalent PTT values for the therapeutic range of heparin levels at 0.1 to 0.7 U/ml are 53 to 110 seconds.      Ethanol [[id number]] Collected: 10/08/20 1458    Specimen: Blood Updated: 10/08/20 1539     Ethanol <10 mg/dL       Ethanol % <0.010 %     CBC Auto Differential [[id number]]  (Abnormal) Collected: 10/08/20 1458    Specimen: Blood Updated: 10/08/20 1515     WBC 5.25 10*3/mm3      RBC 2.77 10*6/mm3      Hemoglobin 8.7 g/dL      Hematocrit 27.5 %      MCV 99.3 fL      MCH 31.4 pg      MCHC 31.6 g/dL      RDW 14.8 %      RDW-SD 54.1 fl      MPV 10.0 fL      Platelets 114 10*3/mm3      Neutrophil % 70.4 %      Lymphocyte % 13.9 %      Monocyte % 12.6 %      Eosinophil % 1.7 %      Basophil % 0.4 %      Immature Grans % 1.0 %      Neutrophils, Absolute 3.70 10*3/mm3      Lymphocytes, Absolute 0.73 10*3/mm3      Monocytes, Absolute 0.66 10*3/mm3      Eosinophils, Absolute 0.09 10*3/mm3      Basophils, Absolute 0.02 10*3/mm3      Immature Grans, Absolute 0.05 10*3/mm3      nRBC 0.0 /100 WBC             I ordered the above labs and reviewed the results    RADIOLOGY  Xr Femur 2 View Right    Result Date: 10/8/2020  10/08/2020: 1. RIGHT HIP. 2. RIGHT FEMUR.     HISTORY: 50-year-old male in the ED with right hip pain after a fall today.  TECHNIQUE: Two view right femur series. Two-view right hip series.  FINDINGS: There is a comminuted, mildly displaced intertrochanteric fracture of the right femur involving both the greater and lesser trochanters. Slight varus angulation. Femoral head and neck appear intact. There is no visible fracture of the acetabulum or right hemipelvis.  Mid and distal portions of the right femur show no acute osseous abnormality. Moderate degenerative arthropathy at the right knee.      Comminuted intertrochanteric right femur fracture.  This report was finalized on 10/8/2020 3:07 PM by Dr. Fermin Choi MD.      Xr Chest 1 View    Result Date: 10/8/2020  CHEST X-RAY, 10/08/2020     HISTORY: 50-year-old male in the ED with acute intertrochanteric right femur fracture after a fall.  TECHNIQUE: AP portable chest x-ray.  FINDINGS: Cardiomediastinal silhouette is within normal limits. Shallow lung expansion. The  lungs appear clear. No visible pneumothorax, pulmonary infiltrate or pleural effusion.      Negative chest.  This report was finalized on 10/8/2020 3:09 PM by Dr. Fermin Choi MD.      Xr Hip With Or Without Pelvis 2 - 3 View Right    Result Date: 10/8/2020  10/08/2020: 1. RIGHT HIP. 2. RIGHT FEMUR.     HISTORY: 50-year-old male in the ED with right hip pain after a fall today.  TECHNIQUE: Two view right femur series. Two-view right hip series.  FINDINGS: There is a comminuted, mildly displaced intertrochanteric fracture of the right femur involving both the greater and lesser trochanters. Slight varus angulation. Femoral head and neck appear intact. There is no visible fracture of the acetabulum or right hemipelvis.  Mid and distal portions of the right femur show no acute osseous abnormality. Moderate degenerative arthropathy at the right knee.      Comminuted intertrochanteric right femur fracture.  This report was finalized on 10/8/2020 3:07 PM by Dr. Fermin Choi MD.        I ordered the above radiologic testing and reviewed the results    PROCEDURES  Procedures      PROGRESS AND CONSULTS  ED Course as of Oct 08 1627   Thu Oct 08, 2020   1520 CONSULT  Discussed case with Dr Solo, orthopedics.  Reviewed history, exam, results and treatments.  Discussed concerns and plan of care. Dr Solo recommends admission, asks that we have hospitalist admit patient.        [KS]   1527 Call out to Dr. Daugherty.  She will call back.    [KS]   1530 I have discussed imaging findings with patient.  He has a comminuted intertrochanteric hip fracture on the right side.  Discussed that he will need to be admitted to the hospital for surgery.  Patient is agreeable with this plan.    [KS]   1540 CONSULT  Discussed case with Dr Daugherty, hospitalist.  Reviewed history, exam, results and treatments.  Discussed concerns and plan of care. Dr Daugherty accepts pt to be admitted to [facility].  I did discuss  patient's recent admission at [facility] and diagnoses of alcohol withdrawal seizures, C. difficile, and esophagitis.  Patient was given thiamine in the ED, he seems to deny any questioning about alcohol use and is tremulous here.  I do feel that patient may be confabulating.        [KS]      ED Course User Index  [KS] Loni Cantor PA-C           MEDICAL DECISION MAKING  Results were reviewed/discussed with the patient and they were also made aware of online access. Pt also made aware that some labs, such as cultures, will not be resulted during ER visit and follow up with PMD is necessary.     MDM        My diagnosis for lower extremity pain and injury includes but is not limited to hip fracture, femur fracture, hip dislocation, hip contusion, hip sprain, hip strain, pelvic fracture, knee sprain, patella dislocation, knee dislocation, internal derangement of knee, fractures of the femur, tibia, fibula, ankle, foot and digits, ankle sprain, ankle dislocation, Lisfranc fracture, fracture dislocations of the digits, pulmonary embolism, claudication, peripheral vascular disease, gout, osteoarthritis, rheumatoid arthritis, bursitis, septic joint, poly-rheumatica, polyarthralgia and other inflammatory or infectious disease processes.      DIAGNOSIS  Final diagnoses:   Closed comminuted intertrochanteric fracture of right femur, initial encounter (CMS/[facility])   History of alcohol abuse   Clostridioides difficile infection       Latest Documented Vital Signs:  As of 16:27 EDT  BP- 144/97 HR- 106 Temp- 98.1 °F (36.7 °C) (Oral) O2 sat- 97%    DISPOSITION  Patient admitted to [facility].       Medication List      No changes were made to your prescriptions during this visit.                   Dictated utilizing Dragon dictation     Loni Cantor PA-C  10/08/20 5705       Loni Cantor PA-C  10/08/20 3819

## 2020-10-08 NOTE — H&P
"[facility] HOSPITALIST     Liza Sapp PA-C    CHIEF COMPLAINT: Right hip pain    HISTORY OF PRESENT ILLNESS:  The patient is a 50-year-old male that presented to the emergency department secondary to ground-level fall onto right hip with immediate pain and in ability to bear weight.  He notes that he was \"walking with his walker, turned and wheel caught, tripping me\".  He states that he was at the pharmacy picking up his medications prescribed at discharge 2 days ago and states \"there must have been at least 12 medicines\".  He reports that he was discharged from [facility] for seizures and concussion on 10/6/2020 after falling due to tripping over his dog.  He states \"supposedly I have some disease that they did not even tell me about called ADD\".  He reports that he also had endoscopy but \"I do not know why\".    Review of [place] records shows admission from 9/28/2022 10/6/2020 alcohol withdrawal, delirium, GI bleed, PETER as discharge diagnoses.     EGD showed reflux esophagitis  Echo showed hyperdynamic LV with EF 79%  EEG showed no seizure activity  Discharge summary shows:   \"1. Alcohol withdrawal seizure  - seizure likely secondary to acute alcohol withdrawal, no further seizures noted since admission  - no plan for AEDs per neurology     2. Alcohol withdrawal/dependence, prophylactic treatment of suspected thiamine deficiency  - sp librium with wean  - continue multivitamin, thiamine, folic acid supplementation  - counseled on alcohol cessation     3. Melena, Acute blood loss anemia  - sp upper endoscopy with GI 10/6 with grade B reflux esophagitis  - continue twice daily PPI x 3 months     4. Transaminitis  - likely secondary to alcohol use, AST/ALT wnl at time of discharge  - RUQ ultrasound shows diffuse fatty infiltration of the liver     5. C. Difficile infection  - continue oral vancomycin x 14 days (end 10/14)     6. Non-MI troponin elevation secondary to above  - mild " "elevation with nonspecific ST-T wave changes noted on EKG  - echo with no regional wall motion abnormalities, EF 79%     7. Acute kidney injury, resolved  - Likely prerenal, improved with IVF, avoid nephrotoxins     8. Electrolyte abnormalities (hypokalemia, hypophosphatemia, hypomagnesemia, hypocalcemia)  - continued scheduled Mag, Calcium, vitamin D     9. Dysphagia, concern for aspiration   - evaluated by speech therapy, recommendation for mechanical soft and thin liquids    10. Pancytopenia, mild  - likely related to alcohol use with underlying liver disease, nutritional workup per PCP    11. Generalized weakness  - evaluated by PT who recommended acute rehab, patient declined, aware of risks including falls\"      He states that he quit his job to move here to help with his dad who had quadruple bypass.  He reports occasional 2 beer per night alcohol intake, none recently and none daily.  He states last drink was 2 weeks ago and was only 2 beers.    He otherwise denies f/c/headache/rhinorrhea/nasal congestion/lightheadedness/syncopal sensation/cough/soa/n/v/d/chest pain/abdominal pain/recent illness/sick exposures/change in bowel or bladder habits/no weight change/bloody emesis or bloody stools/change in medications or any other new concerns.    Past Medical History:   Diagnosis Date   • Anxiety    • Arthritis     JUAN KNEES   • Depression    • Fall    • GERD (gastroesophageal reflux disease)    • Heartburn     ON OCC   • Mass     BASE OF POSTERIOR NECK   • Shoulder dislocation     LONG TERM ISSUES WITH     Past Surgical History:   Procedure Laterality Date   • EXCISION MASS HEAD/NECK N/A 4/20/2016    Procedure: MASS SOFT TISSUE EXCISION POSTERIOR NECK;  Surgeon: Crow Ayala Jr., MD;  Location: [facility] OR;  Service:    • KNEE ARTHROSCOPY Right      Family History   Problem Relation Age of Onset   • Heart disease Father    • Thyroid disease Brother      Social History     Tobacco Use   • Smoking status: " "Never Smoker   • Smokeless tobacco: Never Used   Substance Use Topics   • Alcohol use: Yes     Comment: states no longer drinks daily   • Drug use: No     Medications Prior to Admission   Medication Sig Dispense Refill Last Dose   • [START ON 10/9/2020] folic acid (FOLVITE) 1 MG tablet Take 1 mg by mouth Daily.      • Rebecca, Zingiber officinalis, (Rebecca Root) 250 MG capsule Take 250 mg by mouth Daily. Patient states he is unsure of dosage. \"whatever they sell at [employer]\"      • [START ON 10/9/2020] Magnesium Oxide 400 (240 Mg) MG tablet Take 400 mg by mouth Daily.      • Multiple Vitamins-Minerals (multivitamin and minerals) liquid liquid Take  by mouth Daily.   10/8/2020 at Unknown time   • vancomycin (VANCOCIN) 125 MG capsule Take 125 mg by mouth 4 (Four) Times a Day.        Allergies:  Patient has no known allergies.    Immunization History   Administered Date(s) Administered   • DTaP 10/01/2007       REVIEW OF SYSTEMS:  Please see the above history of present illness for pertinent positives and negatives.  The remainder of the patient's systems have been reviewed and are negative.     Vital Signs  Temp:  [98.1 °F (36.7 °C)-98.9 °F (37.2 °C)] 98.9 °F (37.2 °C)  Heart Rate:  [] 106  Resp:  [18-20] 20  BP: (105-156)/(64-97) 127/90   Body mass index is 33.91 kg/m².    Flowsheet Rows      First Filed Value   Admission Height  182 cm (71.65\") Documented at 10/08/2020 1321   Admission Weight  113 kg (250 lb) Documented at 10/08/2020 1321             Physical Exam  Vitals signs reviewed.   Constitutional:       Appearance: Normal appearance. He is obese.   HENT:      Head: Normocephalic and atraumatic.   Eyes:      Extraocular Movements: Extraocular movements intact.      Pupils: Pupils are equal, round, and reactive to light.   Cardiovascular:      Rate and Rhythm: Normal rate and regular rhythm.   Pulmonary:      Effort: Pulmonary effort is normal.      Breath sounds: Normal breath sounds.   Abdominal:      " General: Abdomen is flat. Bowel sounds are normal. There is no distension.      Palpations: Abdomen is soft.      Tenderness: There is no abdominal tenderness. There is no guarding.   Musculoskeletal:      Comments: 1+ pitting edema right shin/ankle/foot area with scattered ecchymosis  R LE is externally rotated and shortened  Trace pitting edema left ankle   Skin:     General: Skin is warm and dry.      Coloration: Skin is not jaundiced.   Neurological:      General: No focal deficit present.      Mental Status: He is alert and oriented to person, place, and time.      Comments: Course bilateral arm tremors       Results Review:    I reviewed the patient's new clinical results.  Lab Results (most recent)     Procedure Component Value Units Date/Time    Comprehensive Metabolic Panel [[id number]]  (Abnormal) Collected: 10/08/20 1458    Specimen: Blood Updated: 10/08/20 1539     Glucose 135 mg/dL      BUN 13 mg/dL      Creatinine 1.10 mg/dL      Sodium 138 mmol/L      Potassium 4.2 mmol/L      Chloride 104 mmol/L      CO2 24.2 mmol/L      Calcium 9.0 mg/dL      Total Protein 5.9 g/dL      Albumin 2.70 g/dL      ALT (SGPT) 18 U/L      AST (SGOT) 28 U/L      Alkaline Phosphatase 73 U/L      Total Bilirubin 1.2 mg/dL      eGFR Non African Amer 71 mL/min/1.73      Globulin 3.2 gm/dL      A/G Ratio 0.8 g/dL      BUN/Creatinine Ratio 11.8     Anion Gap 9.8 mmol/L     Narrative:      GFR Normal >60  Chronic Kidney Disease <60  Kidney Failure <15      Ethanol [[id number]] Collected: 10/08/20 1458    Specimen: Blood Updated: 10/08/20 1539     Ethanol <10 mg/dL      Ethanol % <0.010 %     Protime-INR [[id number]]  (Abnormal) Collected: 10/08/20 1458    Specimen: Blood Updated: 10/08/20 1528     Protime 16.9 Seconds      INR 1.42    Narrative:      Therapeutic Ranges for INR: 2.0-3.0 (PT 20-30)                              2.5-3.5 (PT 25-34)    aPTT [[id number]]  (Normal) Collected: 10/08/20 1458    Specimen: Blood Updated: 10/08/20  1528     PTT 33.3 seconds     Narrative:      PTT = The equivalent PTT values for the therapeutic range of heparin levels at 0.1 to 0.7 U/ml are 53 to 110 seconds.      CBC & Differential [[id number]]  (Abnormal) Collected: 10/08/20 1458    Specimen: Blood Updated: 10/08/20 1515    Narrative:      The following orders were created for panel order CBC & Differential.  Procedure                               Abnormality         Status                     ---------                               -----------         ------                     CBC Auto Differential[[id number]]        Abnormal            Final result                 Please view results for these tests on the individual orders.    CBC Auto Differential [[id number]]  (Abnormal) Collected: 10/08/20 1458    Specimen: Blood Updated: 10/08/20 1515     WBC 5.25 10*3/mm3      RBC 2.77 10*6/mm3      Hemoglobin 8.7 g/dL      Hematocrit 27.5 %      MCV 99.3 fL      MCH 31.4 pg      MCHC 31.6 g/dL      RDW 14.8 %      RDW-SD 54.1 fl      MPV 10.0 fL      Platelets 114 10*3/mm3      Neutrophil % 70.4 %      Lymphocyte % 13.9 %      Monocyte % 12.6 %      Eosinophil % 1.7 %      Basophil % 0.4 %      Immature Grans % 1.0 %      Neutrophils, Absolute 3.70 10*3/mm3      Lymphocytes, Absolute 0.73 10*3/mm3      Monocytes, Absolute 0.66 10*3/mm3      Eosinophils, Absolute 0.09 10*3/mm3      Basophils, Absolute 0.02 10*3/mm3      Immature Grans, Absolute 0.05 10*3/mm3      nRBC 0.0 /100 WBC     Urine Drug Screen - Urine, Clean Catch [[id number]]  (Abnormal) Collected: 10/08/20 1445    Specimen: Urine, Clean Catch Updated: 10/08/20 1501     THC, Screen, Urine Negative     Phencyclidine (PCP), Urine Negative     Cocaine Screen, Urine Negative     Methamphetamine, Ur Negative     Opiate Screen Negative     Amphetamine Screen, Urine Negative     Benzodiazepine Screen, Urine Positive     Tricyclic Antidepressants Screen Negative     Methadone Screen, Urine Negative     Barbiturates  Screen, Urine Negative     Oxycodone Screen, Urine Negative     Propoxyphene Screen Negative     Buprenorphine, Screen, Urine Negative    Narrative:      Urine drug screen results are to be used for medical purposes only.  They are not to be used for legal purposes such as employment testing.  Negative results do not necessarily mean the complete absence of a subtance, but rather that the result is less than the cutoff for that substance.  Positive results are unconfirmed and considered Preliminary Positive.  [facility] does not automatically confirm Postitive Unconfirmed results.  The physician may request (order) an Unconfirmed Positive result to be sent out for confirmation.      Negative Thresholds for Drugs Screened:    THC screen, urine                          50 ng/ml  Phenycyclidine (PCP), urine                25 ng/ml  Cocaine screen, urine                     150 ng/ml  Methamphetamine, urine                    500 ng/ml  Opiate screen, urine                      100 ng/ml  Amphetamine screen, urine                 500 ng/ml  Benzodiazepine screen, urine              150 ng/ml  Tricyclic Antidepressants screen, urine   300 ng/ml  Methadone screen, urine                   200 ng/ml  Barbiturates screen, urine                200 ng/ml  Oxycodone screen, urine                   100 ng/ml  Propoxyphene screen, urine                300 ng/ml  Buprenorphine screen, urine                10 ng/ml          Imaging Results (Most Recent)     Procedure Component Value Units Date/Time    XR Chest 1 View [[id number]] Collected: 10/08/20 1509     Updated: 10/08/20 1512    Narrative:      CHEST X-RAY, 10/08/2020         HISTORY:  50-year-old male in the ED with acute intertrochanteric right femur  fracture after a fall.     TECHNIQUE:  AP portable chest x-ray.     FINDINGS:  Cardiomediastinal silhouette is within normal limits. Shallow lung  expansion. The lungs appear clear. No visible pneumothorax,  pulmonary  infiltrate or pleural effusion.       Impression:      Negative chest.     This report was finalized on 10/8/2020 3:09 PM by Dr. Fermin Choi MD.       XR Hip With or Without Pelvis 2 - 3 View Right [[id number]] Collected: 10/08/20 1503     Updated: 10/08/20 1509    Narrative:      10/08/2020:  1. RIGHT HIP.  2. RIGHT FEMUR.         HISTORY:  50-year-old male in the ED with right hip pain after a fall today.     TECHNIQUE:  Two view right femur series. Two-view right hip series.     FINDINGS:  There is a comminuted, mildly displaced intertrochanteric fracture of  the right femur involving both the greater and lesser trochanters.  Slight varus angulation. Femoral head and neck appear intact. There is  no visible fracture of the acetabulum or right hemipelvis.     Mid and distal portions of the right femur show no acute osseous  abnormality. Moderate degenerative arthropathy at the right knee.       Impression:      Comminuted intertrochanteric right femur fracture.     This report was finalized on 10/8/2020 3:07 PM by Dr. Fermin Choi MD.       XR Femur 2 View Right [[id number]] Collected: 10/08/20 1503     Updated: 10/08/20 1509    Narrative:      10/08/2020:  1. RIGHT HIP.  2. RIGHT FEMUR.         HISTORY:  50-year-old male in the ED with right hip pain after a fall today.     TECHNIQUE:  Two view right femur series. Two-view right hip series.     FINDINGS:  There is a comminuted, mildly displaced intertrochanteric fracture of  the right femur involving both the greater and lesser trochanters.  Slight varus angulation. Femoral head and neck appear intact. There is  no visible fracture of the acetabulum or right hemipelvis.     Mid and distal portions of the right femur show no acute osseous  abnormality. Moderate degenerative arthropathy at the right knee.       Impression:      Comminuted intertrochanteric right femur fracture.     This report was finalized on 10/8/2020 3:07 PM by Dr. Christensen  MD Steph.           reviewed    ECG/EMG Results (most recent)     Procedure Component Value Units Date/Time    ECG 12 Lead [[id number]] Collected: 10/08/20 1504     Updated: 10/08/20 1506    Narrative:      HEART RATE= 104  bpm  RR Interval= 576  ms  RI Interval= 195  ms  P Horizontal Axis= 33  deg  P Front Axis= 68  deg  QRSD Interval= 97  ms  QT Interval= 345  ms  QRS Axis= 45  deg  T Wave Axis= 25  deg  - OTHERWISE NORMAL ECG -  Sinus tachycardia  Ventricular premature complex  Electronically Signed By:   Date and Time of Study: 2020-10-08 15:04:36        reviewed    Assessment/Plan   Acute comminuted intertrochanteric right: Orthopedic surgery consulted  N.p.o. at midnight  Control pain with morphine and hydrocodone as needed  Hold DVT prophylaxis overnight  Await Ortho surgery consult to determine ability to go to the OR tomorrow    Chronic pancytopenia/macrocytic anemia with coagulopathy:  Recent melena/ABLA/GIB  INR 1.42, hemoglobin 9.7, platelets 114,000 no active blood loss noted  Continue twice daily Protonix  Check anemia panels  Check INR in am, give vitamin K if needed  Monitor    Alcohol dependence with acute alcohol withdrawal, recent seizure:  CIWA protocol initiated, start Librium scheduled  Rally bag in process  Seizure/fall precautions/telemetry/pulse ox  During recent admission was offered inpatient alcohol admission and declined  Currently denies alcohol dependence  Monitor closely    C diff Infection:  Continue oral vancomycin sliding to manage 10/14/2020    Fatty liver disease: Noted during recent admission    Dysphagia: Noted during this admission with mechanical soft/thins recommended    Obesity: Body mass index is 33.91 kg/m².  Recent a1c/TSH normal    I discussed the patients findings and my recommendations with patient.     Amberly Colon, KAT  10/08/20  22:46 EDT

## 2020-10-09 ENCOUNTER — APPOINTMENT (OUTPATIENT)
Dept: GENERAL RADIOLOGY | Facility: HOSPITAL | Age: 50
End: 2020-10-09

## 2020-10-09 ENCOUNTER — ANESTHESIA EVENT (OUTPATIENT)
Dept: PERIOP | Facility: HOSPITAL | Age: 50
End: 2020-10-09

## 2020-10-09 ENCOUNTER — ANESTHESIA (OUTPATIENT)
Dept: PERIOP | Facility: HOSPITAL | Age: 50
End: 2020-10-09

## 2020-10-09 LAB
ALBUMIN SERPL-MCNC: 2.5 G/DL (ref 3.5–5.2)
ALBUMIN/GLOB SERPL: 0.9 G/DL
ALP SERPL-CCNC: 59 U/L (ref 39–117)
ALT SERPL W P-5'-P-CCNC: 16 U/L (ref 1–41)
ANION GAP SERPL CALCULATED.3IONS-SCNC: 8.8 MMOL/L (ref 5–15)
AST SERPL-CCNC: 24 U/L (ref 1–40)
B PARAPERT DNA SPEC QL NAA+PROBE: NOT DETECTED
B PERT DNA SPEC QL NAA+PROBE: NOT DETECTED
BASOPHILS # BLD AUTO: 0.01 10*3/MM3 (ref 0–0.2)
BASOPHILS NFR BLD AUTO: 0.2 % (ref 0–1.5)
BILIRUB SERPL-MCNC: 1.1 MG/DL (ref 0–1.2)
BUN SERPL-MCNC: 10 MG/DL (ref 6–20)
BUN/CREAT SERPL: 13.2 (ref 7–25)
C PNEUM DNA NPH QL NAA+NON-PROBE: NOT DETECTED
CALCIUM SPEC-SCNC: 7.9 MG/DL (ref 8.6–10.5)
CHLORIDE SERPL-SCNC: 104 MMOL/L (ref 98–107)
CO2 SERPL-SCNC: 21.2 MMOL/L (ref 22–29)
CREAT SERPL-MCNC: 0.76 MG/DL (ref 0.76–1.27)
DEPRECATED RDW RBC AUTO: 51.6 FL (ref 37–54)
EOSINOPHIL # BLD AUTO: 0.13 10*3/MM3 (ref 0–0.4)
EOSINOPHIL NFR BLD AUTO: 2.5 % (ref 0.3–6.2)
ERYTHROCYTE [DISTWIDTH] IN BLOOD BY AUTOMATED COUNT: 14.6 % (ref 12.3–15.4)
FIBRINOGEN PPP-MCNC: 343 MG/DL (ref 200–400)
FLUAV H1 2009 PAND RNA NPH QL NAA+PROBE: NOT DETECTED
FLUAV H1 HA GENE NPH QL NAA+PROBE: NOT DETECTED
FLUAV H3 RNA NPH QL NAA+PROBE: NOT DETECTED
FLUAV SUBTYP SPEC NAA+PROBE: NOT DETECTED
FLUBV RNA ISLT QL NAA+PROBE: NOT DETECTED
FOLATE SERPL-MCNC: 15.9 NG/ML (ref 4.78–24.2)
GFR SERPL CREATININE-BSD FRML MDRD: 108 ML/MIN/1.73
GLOBULIN UR ELPH-MCNC: 2.9 GM/DL
GLUCOSE SERPL-MCNC: 91 MG/DL (ref 65–99)
HADV DNA SPEC NAA+PROBE: NOT DETECTED
HCOV 229E RNA SPEC QL NAA+PROBE: NOT DETECTED
HCOV HKU1 RNA SPEC QL NAA+PROBE: NOT DETECTED
HCOV NL63 RNA SPEC QL NAA+PROBE: NOT DETECTED
HCOV OC43 RNA SPEC QL NAA+PROBE: NOT DETECTED
HCT VFR BLD AUTO: 24.9 % (ref 37.5–51)
HCT VFR BLD AUTO: 26.4 % (ref 37.5–51)
HGB BLD-MCNC: 8.1 G/DL (ref 13–17.7)
HGB BLD-MCNC: 8.4 G/DL (ref 13–17.7)
HMPV RNA NPH QL NAA+NON-PROBE: NOT DETECTED
HPIV1 RNA SPEC QL NAA+PROBE: NOT DETECTED
HPIV2 RNA SPEC QL NAA+PROBE: NOT DETECTED
HPIV3 RNA NPH QL NAA+PROBE: NOT DETECTED
HPIV4 P GENE NPH QL NAA+PROBE: NOT DETECTED
IMM GRANULOCYTES # BLD AUTO: 0.05 10*3/MM3 (ref 0–0.05)
IMM GRANULOCYTES NFR BLD AUTO: 1 % (ref 0–0.5)
INR PPP: 1.44 (ref 0.9–1.1)
LYMPHOCYTES # BLD AUTO: 0.96 10*3/MM3 (ref 0.7–3.1)
LYMPHOCYTES NFR BLD AUTO: 18.6 % (ref 19.6–45.3)
M PNEUMO IGG SER IA-ACNC: NOT DETECTED
MCH RBC QN AUTO: 31.2 PG (ref 26.6–33)
MCHC RBC AUTO-ENTMCNC: 31.8 G/DL (ref 31.5–35.7)
MCV RBC AUTO: 98.1 FL (ref 79–97)
MONOCYTES # BLD AUTO: 0.65 10*3/MM3 (ref 0.1–0.9)
MONOCYTES NFR BLD AUTO: 12.6 % (ref 5–12)
NEUTROPHILS NFR BLD AUTO: 3.37 10*3/MM3 (ref 1.7–7)
NEUTROPHILS NFR BLD AUTO: 65.1 % (ref 42.7–76)
NRBC BLD AUTO-RTO: 0 /100 WBC (ref 0–0.2)
PLATELET # BLD AUTO: 118 10*3/MM3 (ref 140–450)
PMV BLD AUTO: 10 FL (ref 6–12)
POTASSIUM SERPL-SCNC: 3.7 MMOL/L (ref 3.5–5.2)
PROT SERPL-MCNC: 5.4 G/DL (ref 6–8.5)
PROTHROMBIN TIME: 17 SECONDS (ref 12.1–15)
RBC # BLD AUTO: 2.69 10*6/MM3 (ref 4.14–5.8)
RHINOVIRUS RNA SPEC NAA+PROBE: NOT DETECTED
RSV RNA NPH QL NAA+NON-PROBE: NOT DETECTED
SARS-COV-2 RNA NPH QL NAA+NON-PROBE: NOT DETECTED
SODIUM SERPL-SCNC: 134 MMOL/L (ref 136–145)
VIT B12 BLD-MCNC: 1317 PG/ML (ref 211–946)
WBC # BLD AUTO: 5.17 10*3/MM3 (ref 3.4–10.8)

## 2020-10-09 PROCEDURE — 82607 VITAMIN B-12: CPT | Performed by: NURSE PRACTITIONER

## 2020-10-09 PROCEDURE — 85610 PROTHROMBIN TIME: CPT | Performed by: NURSE PRACTITIONER

## 2020-10-09 PROCEDURE — 25010000002 SUCCINYLCHOLINE PER 20 MG: Performed by: NURSE ANESTHETIST, CERTIFIED REGISTERED

## 2020-10-09 PROCEDURE — 85025 COMPLETE CBC W/AUTO DIFF WBC: CPT | Performed by: NURSE PRACTITIONER

## 2020-10-09 PROCEDURE — 25010000002 LORAZEPAM PER 2 MG: Performed by: NURSE ANESTHETIST, CERTIFIED REGISTERED

## 2020-10-09 PROCEDURE — 25010000002 VANCOMYCIN 1 G RECONSTITUTED SOLUTION 1 EACH VIAL: Performed by: ORTHOPAEDIC SURGERY

## 2020-10-09 PROCEDURE — 85014 HEMATOCRIT: CPT | Performed by: ORTHOPAEDIC SURGERY

## 2020-10-09 PROCEDURE — 99254 IP/OBS CNSLTJ NEW/EST MOD 60: CPT | Performed by: ORTHOPAEDIC SURGERY

## 2020-10-09 PROCEDURE — C1769 GUIDE WIRE: HCPCS | Performed by: ORTHOPAEDIC SURGERY

## 2020-10-09 PROCEDURE — P9041 ALBUMIN (HUMAN),5%, 50ML: HCPCS | Performed by: NURSE ANESTHETIST, CERTIFIED REGISTERED

## 2020-10-09 PROCEDURE — 25010000002 NEOSTIGMINE 10 MG/10ML SOLUTION: Performed by: NURSE ANESTHETIST, CERTIFIED REGISTERED

## 2020-10-09 PROCEDURE — 80053 COMPREHEN METABOLIC PANEL: CPT | Performed by: NURSE PRACTITIONER

## 2020-10-09 PROCEDURE — 94770: CPT

## 2020-10-09 PROCEDURE — 85384 FIBRINOGEN ACTIVITY: CPT | Performed by: NURSE PRACTITIONER

## 2020-10-09 PROCEDURE — 25010000002 DIPHENHYDRAMINE PER 50 MG: Performed by: NURSE ANESTHETIST, CERTIFIED REGISTERED

## 2020-10-09 PROCEDURE — C1713 ANCHOR/SCREW BN/BN,TIS/BN: HCPCS | Performed by: ORTHOPAEDIC SURGERY

## 2020-10-09 PROCEDURE — 0QSB06Z REPOSITION RIGHT LOWER FEMUR WITH INTRAMEDULLARY INTERNAL FIXATION DEVICE, OPEN APPROACH: ICD-10-PCS | Performed by: ORTHOPAEDIC SURGERY

## 2020-10-09 PROCEDURE — 25010000002 MAGNESIUM SULFATE PER 500 MG OF MAGNESIUM: Performed by: NURSE ANESTHETIST, CERTIFIED REGISTERED

## 2020-10-09 PROCEDURE — 25010000002 MORPHINE PER 10 MG: Performed by: NURSE PRACTITIONER

## 2020-10-09 PROCEDURE — 27245 TREAT THIGH FRACTURE: CPT | Performed by: ORTHOPAEDIC SURGERY

## 2020-10-09 PROCEDURE — 25010000002 DEXAMETHASONE PER 1 MG: Performed by: NURSE ANESTHETIST, CERTIFIED REGISTERED

## 2020-10-09 PROCEDURE — 73502 X-RAY EXAM HIP UNI 2-3 VIEWS: CPT

## 2020-10-09 PROCEDURE — 76000 FLUOROSCOPY <1 HR PHYS/QHP: CPT

## 2020-10-09 PROCEDURE — 25010000003 BUPIVACAINE LIPOSOME 1.3 % SUSPENSION 20 ML VIAL: Performed by: ORTHOPAEDIC SURGERY

## 2020-10-09 PROCEDURE — 25010000002 VANCOMYCIN 750 MG RECONSTITUTED SOLUTION 1 EACH VIAL: Performed by: ORTHOPAEDIC SURGERY

## 2020-10-09 PROCEDURE — C9290 INJ, BUPIVACAINE LIPOSOME: HCPCS | Performed by: ORTHOPAEDIC SURGERY

## 2020-10-09 PROCEDURE — 99232 SBSQ HOSP IP/OBS MODERATE 35: CPT | Performed by: NURSE PRACTITIONER

## 2020-10-09 PROCEDURE — 25010000002 PROPOFOL 10 MG/ML EMULSION: Performed by: NURSE ANESTHETIST, CERTIFIED REGISTERED

## 2020-10-09 PROCEDURE — 25010000002 MORPHINE PER 10 MG: Performed by: ORTHOPAEDIC SURGERY

## 2020-10-09 PROCEDURE — 82746 ASSAY OF FOLIC ACID SERUM: CPT | Performed by: NURSE PRACTITIONER

## 2020-10-09 PROCEDURE — 25010000002 FENTANYL CITRATE (PF) 100 MCG/2ML SOLUTION: Performed by: NURSE ANESTHETIST, CERTIFIED REGISTERED

## 2020-10-09 PROCEDURE — 25010000002 ALBUMIN HUMAN 5% PER 50 ML: Performed by: NURSE ANESTHETIST, CERTIFIED REGISTERED

## 2020-10-09 PROCEDURE — 25010000002 PHENYLEPHRINE PER 1 ML: Performed by: NURSE ANESTHETIST, CERTIFIED REGISTERED

## 2020-10-09 PROCEDURE — 25010000002 ONDANSETRON PER 1 MG: Performed by: NURSE ANESTHETIST, CERTIFIED REGISTERED

## 2020-10-09 PROCEDURE — 85018 HEMOGLOBIN: CPT | Performed by: ORTHOPAEDIC SURGERY

## 2020-10-09 PROCEDURE — 25010000002 PHENYLEPHRINE 10 MG/ML SOLUTION 1 ML VIAL: Performed by: NURSE ANESTHETIST, CERTIFIED REGISTERED

## 2020-10-09 DEVICE — IMPLANTABLE DEVICE: Type: IMPLANTABLE DEVICE | Site: HIP | Status: FUNCTIONAL

## 2020-10-09 DEVICE — SCRW LK STRDRV TI 5X42MM STRL: Type: IMPLANTABLE DEVICE | Site: HIP | Status: FUNCTIONAL

## 2020-10-09 DEVICE — DEV CONTRL TISS STRATAFIX SPIRAL MNCRYL UD 3/0 PLS 45CM: Type: IMPLANTABLE DEVICE | Site: HIP | Status: FUNCTIONAL

## 2020-10-09 DEVICE — SCRW LK STRDRV TI 5X48MM STRL: Type: IMPLANTABLE DEVICE | Site: HIP | Status: FUNCTIONAL

## 2020-10-09 RX ORDER — NEOSTIGMINE METHYLSULFATE 1 MG/ML
INJECTION, SOLUTION INTRAVENOUS AS NEEDED
Status: DISCONTINUED | OUTPATIENT
Start: 2020-10-09 | End: 2020-10-09 | Stop reason: SURG

## 2020-10-09 RX ORDER — DEXMEDETOMIDINE HYDROCHLORIDE 100 UG/ML
INJECTION, SOLUTION INTRAVENOUS AS NEEDED
Status: DISCONTINUED | OUTPATIENT
Start: 2020-10-09 | End: 2020-10-09 | Stop reason: SURG

## 2020-10-09 RX ORDER — LIDOCAINE HYDROCHLORIDE 20 MG/ML
INJECTION, SOLUTION INFILTRATION; PERINEURAL AS NEEDED
Status: DISCONTINUED | OUTPATIENT
Start: 2020-10-09 | End: 2020-10-09 | Stop reason: SURG

## 2020-10-09 RX ORDER — PREGABALIN 75 MG/1
150 CAPSULE ORAL ONCE
Status: COMPLETED | OUTPATIENT
Start: 2020-10-09 | End: 2020-10-09

## 2020-10-09 RX ORDER — GLYCOPYRROLATE 0.2 MG/ML
INJECTION INTRAMUSCULAR; INTRAVENOUS AS NEEDED
Status: DISCONTINUED | OUTPATIENT
Start: 2020-10-09 | End: 2020-10-09 | Stop reason: SURG

## 2020-10-09 RX ORDER — SODIUM CHLORIDE, SODIUM LACTATE, POTASSIUM CHLORIDE, CALCIUM CHLORIDE 600; 310; 30; 20 MG/100ML; MG/100ML; MG/100ML; MG/100ML
9 INJECTION, SOLUTION INTRAVENOUS CONTINUOUS PRN
Status: DISCONTINUED | OUTPATIENT
Start: 2020-10-09 | End: 2020-10-09 | Stop reason: HOSPADM

## 2020-10-09 RX ORDER — DIPHENHYDRAMINE HYDROCHLORIDE 50 MG/ML
12.5 INJECTION INTRAMUSCULAR; INTRAVENOUS ONCE
Status: COMPLETED | OUTPATIENT
Start: 2020-10-09 | End: 2020-10-09

## 2020-10-09 RX ORDER — SUCCINYLCHOLINE CHLORIDE 20 MG/ML
INJECTION INTRAMUSCULAR; INTRAVENOUS AS NEEDED
Status: DISCONTINUED | OUTPATIENT
Start: 2020-10-09 | End: 2020-10-09 | Stop reason: SURG

## 2020-10-09 RX ORDER — FAMOTIDINE 10 MG/ML
20 INJECTION, SOLUTION INTRAVENOUS
Status: COMPLETED | OUTPATIENT
Start: 2020-10-09 | End: 2020-10-09

## 2020-10-09 RX ORDER — SODIUM CHLORIDE 9 MG/ML
40 INJECTION, SOLUTION INTRAVENOUS AS NEEDED
Status: DISCONTINUED | OUTPATIENT
Start: 2020-10-09 | End: 2020-10-09 | Stop reason: HOSPADM

## 2020-10-09 RX ORDER — MELOXICAM 7.5 MG/1
15 TABLET ORAL ONCE
Status: COMPLETED | OUTPATIENT
Start: 2020-10-09 | End: 2020-10-09

## 2020-10-09 RX ORDER — LORAZEPAM 2 MG/ML
1 INJECTION INTRAMUSCULAR ONCE
Status: COMPLETED | OUTPATIENT
Start: 2020-10-09 | End: 2020-10-09

## 2020-10-09 RX ORDER — MEPERIDINE HYDROCHLORIDE 25 MG/ML
12.5 INJECTION INTRAMUSCULAR; INTRAVENOUS; SUBCUTANEOUS
Status: DISCONTINUED | OUTPATIENT
Start: 2020-10-09 | End: 2020-10-09 | Stop reason: HOSPADM

## 2020-10-09 RX ORDER — FENTANYL CITRATE 50 UG/ML
INJECTION, SOLUTION INTRAMUSCULAR; INTRAVENOUS AS NEEDED
Status: DISCONTINUED | OUTPATIENT
Start: 2020-10-09 | End: 2020-10-09 | Stop reason: SURG

## 2020-10-09 RX ORDER — MAGNESIUM SULFATE HEPTAHYDRATE 500 MG/ML
INJECTION, SOLUTION INTRAMUSCULAR; INTRAVENOUS AS NEEDED
Status: DISCONTINUED | OUTPATIENT
Start: 2020-10-09 | End: 2020-10-09 | Stop reason: SURG

## 2020-10-09 RX ORDER — OXYCODONE AND ACETAMINOPHEN 7.5; 325 MG/1; MG/1
2 TABLET ORAL EVERY 4 HOURS PRN
Status: DISCONTINUED | OUTPATIENT
Start: 2020-10-09 | End: 2020-10-19 | Stop reason: HOSPADM

## 2020-10-09 RX ORDER — HYDROMORPHONE HYDROCHLORIDE 1 MG/ML
0.5 INJECTION, SOLUTION INTRAMUSCULAR; INTRAVENOUS; SUBCUTANEOUS
Status: DISCONTINUED | OUTPATIENT
Start: 2020-10-09 | End: 2020-10-09 | Stop reason: HOSPADM

## 2020-10-09 RX ORDER — SODIUM CHLORIDE 0.9 % (FLUSH) 0.9 %
10 SYRINGE (ML) INJECTION AS NEEDED
Status: DISCONTINUED | OUTPATIENT
Start: 2020-10-09 | End: 2020-10-09 | Stop reason: HOSPADM

## 2020-10-09 RX ORDER — SODIUM CHLORIDE 9 MG/ML
75 INJECTION, SOLUTION INTRAVENOUS CONTINUOUS
Status: DISCONTINUED | OUTPATIENT
Start: 2020-10-09 | End: 2020-10-10

## 2020-10-09 RX ORDER — ONDANSETRON 2 MG/ML
4 INJECTION INTRAMUSCULAR; INTRAVENOUS ONCE AS NEEDED
Status: DISCONTINUED | OUTPATIENT
Start: 2020-10-09 | End: 2020-10-09 | Stop reason: HOSPADM

## 2020-10-09 RX ORDER — ACETAMINOPHEN 500 MG
1000 TABLET ORAL ONCE
Status: COMPLETED | OUTPATIENT
Start: 2020-10-09 | End: 2020-10-09

## 2020-10-09 RX ORDER — VANCOMYCIN HYDROCHLORIDE 750 MG/15ML
INJECTION, POWDER, LYOPHILIZED, FOR SOLUTION INTRAVENOUS
Status: DISPENSED
Start: 2020-10-09 | End: 2020-10-10

## 2020-10-09 RX ORDER — ALBUMIN, HUMAN INJ 5% 5 %
SOLUTION INTRAVENOUS CONTINUOUS PRN
Status: DISCONTINUED | OUTPATIENT
Start: 2020-10-09 | End: 2020-10-09 | Stop reason: SURG

## 2020-10-09 RX ORDER — OXYCODONE AND ACETAMINOPHEN 7.5; 325 MG/1; MG/1
1 TABLET ORAL EVERY 4 HOURS PRN
Status: DISCONTINUED | OUTPATIENT
Start: 2020-10-09 | End: 2020-10-19 | Stop reason: HOSPADM

## 2020-10-09 RX ORDER — PROPOFOL 10 MG/ML
VIAL (ML) INTRAVENOUS AS NEEDED
Status: DISCONTINUED | OUTPATIENT
Start: 2020-10-09 | End: 2020-10-09 | Stop reason: SURG

## 2020-10-09 RX ORDER — SODIUM CHLORIDE, SODIUM LACTATE, POTASSIUM CHLORIDE, CALCIUM CHLORIDE 600; 310; 30; 20 MG/100ML; MG/100ML; MG/100ML; MG/100ML
100 INJECTION, SOLUTION INTRAVENOUS CONTINUOUS
Status: DISCONTINUED | OUTPATIENT
Start: 2020-10-09 | End: 2020-10-10

## 2020-10-09 RX ORDER — MAGNESIUM HYDROXIDE 1200 MG/15ML
LIQUID ORAL AS NEEDED
Status: DISCONTINUED | OUTPATIENT
Start: 2020-10-09 | End: 2020-10-09 | Stop reason: HOSPADM

## 2020-10-09 RX ORDER — VANCOMYCIN HYDROCHLORIDE 1 G/20ML
INJECTION, POWDER, LYOPHILIZED, FOR SOLUTION INTRAVENOUS
Status: DISPENSED
Start: 2020-10-09 | End: 2020-10-10

## 2020-10-09 RX ORDER — LIDOCAINE HYDROCHLORIDE 10 MG/ML
0.5 INJECTION, SOLUTION EPIDURAL; INFILTRATION; INTRACAUDAL; PERINEURAL ONCE AS NEEDED
Status: DISCONTINUED | OUTPATIENT
Start: 2020-10-09 | End: 2020-10-09 | Stop reason: HOSPADM

## 2020-10-09 RX ORDER — DEXAMETHASONE SODIUM PHOSPHATE 4 MG/ML
8 INJECTION, SOLUTION INTRA-ARTICULAR; INTRALESIONAL; INTRAMUSCULAR; INTRAVENOUS; SOFT TISSUE ONCE AS NEEDED
Status: COMPLETED | OUTPATIENT
Start: 2020-10-09 | End: 2020-10-09

## 2020-10-09 RX ORDER — HYDROMORPHONE HYDROCHLORIDE 1 MG/ML
0.25 INJECTION, SOLUTION INTRAMUSCULAR; INTRAVENOUS; SUBCUTANEOUS
Status: DISCONTINUED | OUTPATIENT
Start: 2020-10-09 | End: 2020-10-09 | Stop reason: HOSPADM

## 2020-10-09 RX ORDER — ONDANSETRON 2 MG/ML
4 INJECTION INTRAMUSCULAR; INTRAVENOUS EVERY 6 HOURS PRN
Status: DISCONTINUED | OUTPATIENT
Start: 2020-10-09 | End: 2020-10-19 | Stop reason: HOSPADM

## 2020-10-09 RX ORDER — SODIUM CHLORIDE 9 MG/ML
INJECTION, SOLUTION INTRAVENOUS
Status: DISPENSED
Start: 2020-10-09 | End: 2020-10-10

## 2020-10-09 RX ORDER — ROCURONIUM BROMIDE 10 MG/ML
INJECTION, SOLUTION INTRAVENOUS AS NEEDED
Status: DISCONTINUED | OUTPATIENT
Start: 2020-10-09 | End: 2020-10-09 | Stop reason: SURG

## 2020-10-09 RX ORDER — ONDANSETRON 2 MG/ML
4 INJECTION INTRAMUSCULAR; INTRAVENOUS ONCE AS NEEDED
Status: COMPLETED | OUTPATIENT
Start: 2020-10-09 | End: 2020-10-09

## 2020-10-09 RX ORDER — SODIUM CHLORIDE 0.9 % (FLUSH) 0.9 %
10 SYRINGE (ML) INJECTION EVERY 12 HOURS SCHEDULED
Status: DISCONTINUED | OUTPATIENT
Start: 2020-10-09 | End: 2020-10-09 | Stop reason: HOSPADM

## 2020-10-09 RX ORDER — ONDANSETRON 4 MG/1
4 TABLET, FILM COATED ORAL EVERY 6 HOURS PRN
Status: DISCONTINUED | OUTPATIENT
Start: 2020-10-09 | End: 2020-10-19 | Stop reason: HOSPADM

## 2020-10-09 RX ORDER — KETAMINE HYDROCHLORIDE 10 MG/ML
INJECTION INTRAMUSCULAR; INTRAVENOUS AS NEEDED
Status: DISCONTINUED | OUTPATIENT
Start: 2020-10-09 | End: 2020-10-09 | Stop reason: SURG

## 2020-10-09 RX ORDER — CHLORDIAZEPOXIDE HYDROCHLORIDE 5 MG/1
10 CAPSULE, GELATIN COATED ORAL ONCE
Status: COMPLETED | OUTPATIENT
Start: 2020-10-09 | End: 2020-10-09

## 2020-10-09 RX ADMIN — ALBUMIN (HUMAN): 12.5 INJECTION, SOLUTION INTRAVENOUS at 18:03

## 2020-10-09 RX ADMIN — PHENYLEPHRINE HYDROCHLORIDE 100 MCG: 10 INJECTION, SOLUTION INTRAMUSCULAR; INTRAVENOUS; SUBCUTANEOUS at 17:11

## 2020-10-09 RX ADMIN — SODIUM CHLORIDE 75 ML/HR: 9 INJECTION, SOLUTION INTRAVENOUS at 07:48

## 2020-10-09 RX ADMIN — LORAZEPAM 1 MG: 2 INJECTION INTRAMUSCULAR; INTRAVENOUS at 15:14

## 2020-10-09 RX ADMIN — DEXMEDETOMIDINE HYDROCHLORIDE 10 MCG: 100 INJECTION, SOLUTION, CONCENTRATE INTRAVENOUS at 16:55

## 2020-10-09 RX ADMIN — VANCOMYCIN HYDROCHLORIDE 1750 MG: 1 INJECTION, POWDER, LYOPHILIZED, FOR SOLUTION INTRAVENOUS at 15:00

## 2020-10-09 RX ADMIN — DIPHENHYDRAMINE HYDROCHLORIDE 12.5 MG: 50 INJECTION, SOLUTION INTRAMUSCULAR; INTRAVENOUS at 14:55

## 2020-10-09 RX ADMIN — VANCOMYCIN HYDROCHLORIDE 125 MG: 125 CAPSULE ORAL at 21:13

## 2020-10-09 RX ADMIN — PROPOFOL 200 MG: 10 INJECTION, EMULSION INTRAVENOUS at 16:13

## 2020-10-09 RX ADMIN — NEOSTIGMINE METHYLSULFATE 2.5 MG: 1 INJECTION INTRAVENOUS at 18:01

## 2020-10-09 RX ADMIN — SODIUM CHLORIDE 1000 MG: 9 INJECTION, SOLUTION INTRAVENOUS at 16:20

## 2020-10-09 RX ADMIN — PHENYLEPHRINE HYDROCHLORIDE 100 MCG: 10 INJECTION, SOLUTION INTRAMUSCULAR; INTRAVENOUS; SUBCUTANEOUS at 16:55

## 2020-10-09 RX ADMIN — FAMOTIDINE 20 MG: 10 INJECTION INTRAVENOUS at 14:55

## 2020-10-09 RX ADMIN — PREGABALIN 150 MG: 75 CAPSULE ORAL at 14:56

## 2020-10-09 RX ADMIN — ACETAMINOPHEN 1000 MG: 500 TABLET, FILM COATED ORAL at 14:56

## 2020-10-09 RX ADMIN — DEXMEDETOMIDINE HYDROCHLORIDE 10 MCG: 100 INJECTION, SOLUTION, CONCENTRATE INTRAVENOUS at 16:30

## 2020-10-09 RX ADMIN — DEXMEDETOMIDINE HYDROCHLORIDE 10 MCG: 100 INJECTION, SOLUTION, CONCENTRATE INTRAVENOUS at 16:59

## 2020-10-09 RX ADMIN — SODIUM CHLORIDE, PRESERVATIVE FREE 10 ML: 5 INJECTION INTRAVENOUS at 21:13

## 2020-10-09 RX ADMIN — FENTANYL CITRATE 50 MCG: 50 INJECTION, SOLUTION INTRAMUSCULAR; INTRAVENOUS at 16:13

## 2020-10-09 RX ADMIN — MAGNESIUM SULFATE HEPTAHYDRATE 1 G: 500 INJECTION, SOLUTION INTRAMUSCULAR; INTRAVENOUS at 16:30

## 2020-10-09 RX ADMIN — ONDANSETRON 4 MG: 2 INJECTION, SOLUTION INTRAMUSCULAR; INTRAVENOUS at 14:55

## 2020-10-09 RX ADMIN — CHLORDIAZEPOXIDE HYDROCHLORIDE 10 MG: 5 CAPSULE ORAL at 11:52

## 2020-10-09 RX ADMIN — DEXMEDETOMIDINE HYDROCHLORIDE 10 MCG: 100 INJECTION, SOLUTION, CONCENTRATE INTRAVENOUS at 16:47

## 2020-10-09 RX ADMIN — PHENYLEPHRINE HYDROCHLORIDE 0.2 MCG/KG/MIN: 10 INJECTION INTRAVENOUS at 17:17

## 2020-10-09 RX ADMIN — SUCCINYLCHOLINE CHLORIDE 160 MG: 20 INJECTION, SOLUTION INTRAMUSCULAR; INTRAVENOUS at 16:13

## 2020-10-09 RX ADMIN — MORPHINE SULFATE 4 MG: 4 INJECTION INTRAVENOUS at 04:43

## 2020-10-09 RX ADMIN — VANCOMYCIN HYDROCHLORIDE 125 MG: 125 CAPSULE ORAL at 11:52

## 2020-10-09 RX ADMIN — MORPHINE SULFATE 4 MG: 4 INJECTION INTRAVENOUS at 09:16

## 2020-10-09 RX ADMIN — PHENYLEPHRINE HYDROCHLORIDE 100 MCG: 10 INJECTION, SOLUTION INTRAMUSCULAR; INTRAVENOUS; SUBCUTANEOUS at 17:15

## 2020-10-09 RX ADMIN — PHENYLEPHRINE HYDROCHLORIDE 100 MCG: 10 INJECTION, SOLUTION INTRAMUSCULAR; INTRAVENOUS; SUBCUTANEOUS at 17:02

## 2020-10-09 RX ADMIN — HYDROCODONE BITARTRATE AND ACETAMINOPHEN 1 TABLET: 5; 325 TABLET ORAL at 11:52

## 2020-10-09 RX ADMIN — CHLORDIAZEPOXIDE HYDROCHLORIDE 10 MG: 5 CAPSULE ORAL at 06:10

## 2020-10-09 RX ADMIN — DEXAMETHASONE SODIUM PHOSPHATE 8 MG: 4 INJECTION, SOLUTION INTRAMUSCULAR; INTRAVENOUS at 15:13

## 2020-10-09 RX ADMIN — SODIUM CHLORIDE 1000 MG: 9 INJECTION, SOLUTION INTRAVENOUS at 17:51

## 2020-10-09 RX ADMIN — MELOXICAM 15 MG: 7.5 TABLET ORAL at 14:56

## 2020-10-09 RX ADMIN — ROCURONIUM BROMIDE 30 MG: 10 INJECTION, SOLUTION INTRAVENOUS at 16:21

## 2020-10-09 RX ADMIN — DEXMEDETOMIDINE HYDROCHLORIDE 10 MCG: 100 INJECTION, SOLUTION, CONCENTRATE INTRAVENOUS at 16:50

## 2020-10-09 RX ADMIN — VANCOMYCIN HYDROCHLORIDE 1750 MG: 1 INJECTION, POWDER, LYOPHILIZED, FOR SOLUTION INTRAVENOUS at 23:20

## 2020-10-09 RX ADMIN — CHLORDIAZEPOXIDE HYDROCHLORIDE 10 MG: 5 CAPSULE ORAL at 23:20

## 2020-10-09 RX ADMIN — ALBUMIN (HUMAN): 12.5 INJECTION, SOLUTION INTRAVENOUS at 18:26

## 2020-10-09 RX ADMIN — GLYCOPYRROLATE 0.4 MG: 0.2 INJECTION INTRAMUSCULAR; INTRAVENOUS at 18:01

## 2020-10-09 RX ADMIN — MAGNESIUM SULFATE HEPTAHYDRATE 1 G: 500 INJECTION, SOLUTION INTRAMUSCULAR; INTRAVENOUS at 16:51

## 2020-10-09 RX ADMIN — LIDOCAINE HYDROCHLORIDE 100 MG: 20 INJECTION, SOLUTION INFILTRATION; PERINEURAL at 16:13

## 2020-10-09 RX ADMIN — SODIUM CHLORIDE, PRESERVATIVE FREE 10 ML: 5 INJECTION INTRAVENOUS at 09:19

## 2020-10-09 RX ADMIN — SODIUM CHLORIDE, POTASSIUM CHLORIDE, SODIUM LACTATE AND CALCIUM CHLORIDE 9 ML/HR: 600; 310; 30; 20 INJECTION, SOLUTION INTRAVENOUS at 15:15

## 2020-10-09 RX ADMIN — MAGNESIUM SULFATE HEPTAHYDRATE 1 G: 500 INJECTION, SOLUTION INTRAMUSCULAR; INTRAVENOUS at 17:10

## 2020-10-09 RX ADMIN — CHLORDIAZEPOXIDE HYDROCHLORIDE 10 MG: 5 CAPSULE ORAL at 09:16

## 2020-10-09 RX ADMIN — PHENYLEPHRINE HYDROCHLORIDE 100 MCG: 10 INJECTION, SOLUTION INTRAMUSCULAR; INTRAVENOUS; SUBCUTANEOUS at 16:47

## 2020-10-09 RX ADMIN — VANCOMYCIN HYDROCHLORIDE 125 MG: 125 CAPSULE ORAL at 09:16

## 2020-10-09 RX ADMIN — KETAMINE HYDROCHLORIDE 50 MG: 10 INJECTION, SOLUTION INTRAMUSCULAR; INTRAVENOUS at 16:48

## 2020-10-09 NOTE — PLAN OF CARE
Goal Outcome Evaluation:  Plan of Care Reviewed With: patient  Progress: improving  Outcome Summary: vss, new admit for right hip fx.  pain management in process.  surgical consult to determine plan.

## 2020-10-09 NOTE — PROGRESS NOTES
"SERVICE: [facility] HOSPITALIST    CONSULTANTS: ortho surgery    CHIEF COMPLAINT: right hip fracture, alcohol withdrawal    SUBJECTIVE: the patient reports his pain is much better controlled on current regimen, less tremulous. Denies f/c/cough/soa/chest pain/n/v/d/abdominal pain or other new concerns.    OBJECTIVE:    /86 (BP Location: Right arm, Patient Position: Lying)   Pulse 97   Temp 99 °F (37.2 °C) (Oral)   Resp 16   Ht 182.9 cm (72\")   Wt 113 kg (250 lb)   SpO2 98%   BMI 33.91 kg/m²     MEDS/LABS REVIEWED AND ORDERED    chlordiazePOXIDE, 10 mg, Oral, Q6H  famotidine, 40 mg, Oral, Daily  folic acid, 1 mg, Oral, Daily  folic acid, , ,   Magnesium Oxide, 400 mg, Oral, Daily  multiple vitamin, , ,   multivitamin with minerals, 1 tablet, Oral, Daily  sodium chloride, 10 mL, Intravenous, Q12H  thiamine, , ,   vancomycin, 125 mg, Oral, 4x Daily      Physical Exam  Vitals signs reviewed.   Constitutional:       General: He is not in acute distress.     Appearance: He is obese.   HENT:      Head: Normocephalic and atraumatic.   Eyes:      Pupils: Pupils are equal, round, and reactive to light.   Cardiovascular:      Comments: Tachycardic, regular  Pulmonary:      Effort: Pulmonary effort is normal. No respiratory distress.      Breath sounds: Normal breath sounds. No wheezing or rales.   Abdominal:      General: Abdomen is flat. Bowel sounds are normal. There is no distension.      Palpations: Abdomen is soft.      Tenderness: There is no abdominal tenderness. There is no guarding.   Musculoskeletal:      Comments: Trace R>L pedal/ankle edema  RLE externally rotated and shortened  CMS intact to RLE   Skin:     General: Skin is warm and dry.      Capillary Refill: Capillary refill takes less than 2 seconds.      Coloration: Skin is not jaundiced.      Findings: Bruising present.      Comments: Ecchymosis right foot area   Neurological:      General: No focal deficit present.      Mental " Status: He is alert and oriented to person, place, and time.      Comments: Fine upper body tremors       LAB/DIAGNOSTICS:    Lab Results (last 24 hours)     Procedure Component Value Units Date/Time    COVID PRE-OP / PRE-PROCEDURE SCREENING ORDER (NO ISOLATION) - Swab, Nasopharynx [[id number]]  (Normal) Collected: 10/08/20 2103    Specimen: Swab from Nasopharynx Updated: 10/09/20 0054    Narrative:      The following orders were created for panel order COVID PRE-OP / PRE-PROCEDURE SCREENING ORDER (NO ISOLATION) - Swab, Nasopharynx.  Procedure                               Abnormality         Status                     ---------                               -----------         ------                     Respiratory Panel PCR w/...[[id number]]  Normal              Final result                 Please view results for these tests on the individual orders.    Respiratory Panel PCR w/COVID-19(SARS-CoV-2) MICHAEL/ALTAF/KYLE/PAD/COR/MAD In-House, NP Swab in UTM/VTM, 3-4 HR TAT - Swab, Nasopharynx [[id number]]  (Normal) Collected: 10/08/20 2103    Specimen: Swab from Nasopharynx Updated: 10/09/20 0054     ADENOVIRUS, PCR Not Detected     Coronavirus 229E Not Detected     Coronavirus HKU1 Not Detected     Coronavirus NL63 Not Detected     Coronavirus OC43 Not Detected     COVID19 Not Detected     Human Metapneumovirus Not Detected     Human Rhinovirus/Enterovirus Not Detected     Influenza A PCR Not Detected     Influenza A H1 Not Detected     Influenza A H1 2009 PCR Not Detected     Influenza A H3 Not Detected     Influenza B PCR Not Detected     Parainfluenza Virus 1 Not Detected     Parainfluenza Virus 2 Not Detected     Parainfluenza Virus 3 Not Detected     Parainfluenza Virus 4 Not Detected     RSV, PCR Not Detected     Bordetella pertussis pcr Not Detected     Bordetella parapertussis PCR Not Detected     Chlamydophila pneumoniae PCR Not Detected     Mycoplasma pneumo by PCR Not Detected    Narrative:      Fact sheet for  providers: https://docs.[employer]/wp-content/uploads/KPG5164-2712-DM7.1-EUA-Provider-Fact-Sheet-3.pdf    Fact sheet for patients: https://docs.[employer]/wp-content/uploads/ROE0633-4270-CF6.1-EUA-Patient-Fact-Sheet-1.pdf  Fact sheet for providers: https://docs.[employer]/wp-content/uploads/HOC3206-1730-FT7.1-EUA-Provider-Fact-Sheet-3.pdf    Fact sheet for patients: https://[employer]/wp-content/uploads/UXR4872-2662-IH2.1-EUA-Patient-Fact-Sheet-1.pdf    Hemoglobin A1c [[id number]]  (Normal) Collected: 10/08/20 1458    Specimen: Blood Updated: 10/08/20 2119     Hemoglobin A1C 4.89 %     Narrative:      Hemoglobin A1C Ranges:    Increased Risk for Diabetes  5.7% to 6.4%  Diabetes                     >= 6.5%  Diabetic Goal                < 7.0%    TSH [[id number]]  (Normal) Collected: 10/08/20 1458    Specimen: Blood Updated: 10/08/20 2049     TSH 0.845 uIU/mL     CK [[id number]]  (Normal) Collected: 10/08/20 1458    Specimen: Blood Updated: 10/08/20 2042     Creatine Kinase 73 U/L     Iron Profile [[id number]]  (Abnormal) Collected: 10/08/20 1458    Specimen: Blood Updated: 10/08/20 2042     Iron 44 mcg/dL      Iron Saturation 22 %      UIBC 160 mcg/dL      TIBC 204 mcg/dL     Ferritin [[id number]]  (Normal) Collected: 10/08/20 1458    Specimen: Blood Updated: 10/08/20 2042     Ferritin 263.00 ng/mL     Narrative:      Results may be falsely decreased if patient taking Biotin.      Comprehensive Metabolic Panel [[id number]]  (Abnormal) Collected: 10/08/20 1458    Specimen: Blood Updated: 10/08/20 1539     Glucose 135 mg/dL      BUN 13 mg/dL      Creatinine 1.10 mg/dL      Sodium 138 mmol/L      Potassium 4.2 mmol/L      Chloride 104 mmol/L      CO2 24.2 mmol/L      Calcium 9.0 mg/dL      Total Protein 5.9 g/dL      Albumin 2.70 g/dL      ALT (SGPT) 18 U/L      AST (SGOT) 28 U/L      Alkaline Phosphatase 73 U/L      Total Bilirubin 1.2 mg/dL      eGFR Non African Amer 71 mL/min/1.73      Globulin 3.2 gm/dL        A/G Ratio 0.8 g/dL      BUN/Creatinine Ratio 11.8     Anion Gap 9.8 mmol/L     Narrative:      GFR Normal >60  Chronic Kidney Disease <60  Kidney Failure <15      Ethanol [[id number]] Collected: 10/08/20 1458    Specimen: Blood Updated: 10/08/20 1539     Ethanol <10 mg/dL      Ethanol % <0.010 %     Protime-INR [[id number]]  (Abnormal) Collected: 10/08/20 1458    Specimen: Blood Updated: 10/08/20 1528     Protime 16.9 Seconds      INR 1.42    Narrative:      Therapeutic Ranges for INR: 2.0-3.0 (PT 20-30)                              2.5-3.5 (PT 25-34)    aPTT [[id number]]  (Normal) Collected: 10/08/20 1458    Specimen: Blood Updated: 10/08/20 1528     PTT 33.3 seconds     Narrative:      PTT = The equivalent PTT values for the therapeutic range of heparin levels at 0.1 to 0.7 U/ml are 53 to 110 seconds.      CBC & Differential [[id number]]  (Abnormal) Collected: 10/08/20 1458    Specimen: Blood Updated: 10/08/20 1515    Narrative:      The following orders were created for panel order CBC & Differential.  Procedure                               Abnormality         Status                     ---------                               -----------         ------                     CBC Auto Differential[[id number]]        Abnormal            Final result                 Please view results for these tests on the individual orders.    CBC Auto Differential [[id number]]  (Abnormal) Collected: 10/08/20 1458    Specimen: Blood Updated: 10/08/20 1515     WBC 5.25 10*3/mm3      RBC 2.77 10*6/mm3      Hemoglobin 8.7 g/dL      Hematocrit 27.5 %      MCV 99.3 fL      MCH 31.4 pg      MCHC 31.6 g/dL      RDW 14.8 %      RDW-SD 54.1 fl      MPV 10.0 fL      Platelets 114 10*3/mm3      Neutrophil % 70.4 %      Lymphocyte % 13.9 %      Monocyte % 12.6 %      Eosinophil % 1.7 %      Basophil % 0.4 %      Immature Grans % 1.0 %      Neutrophils, Absolute 3.70 10*3/mm3      Lymphocytes, Absolute 0.73 10*3/mm3      Monocytes, Absolute 0.66  10*3/mm3      Eosinophils, Absolute 0.09 10*3/mm3      Basophils, Absolute 0.02 10*3/mm3      Immature Grans, Absolute 0.05 10*3/mm3      nRBC 0.0 /100 WBC     Urine Drug Screen - Urine, Clean Catch [[id number]]  (Abnormal) Collected: 10/08/20 1445    Specimen: Urine, Clean Catch Updated: 10/08/20 1501     THC, Screen, Urine Negative     Phencyclidine (PCP), Urine Negative     Cocaine Screen, Urine Negative     Methamphetamine, Ur Negative     Opiate Screen Negative     Amphetamine Screen, Urine Negative     Benzodiazepine Screen, Urine Positive     Tricyclic Antidepressants Screen Negative     Methadone Screen, Urine Negative     Barbiturates Screen, Urine Negative     Oxycodone Screen, Urine Negative     Propoxyphene Screen Negative     Buprenorphine, Screen, Urine Negative    Narrative:      Urine drug screen results are to be used for medical purposes only.  They are not to be used for legal purposes such as employment testing.  Negative results do not necessarily mean the complete absence of a subtance, but rather that the result is less than the cutoff for that substance.  Positive results are unconfirmed and considered Preliminary Positive.  [facility] does not automatically confirm Postitive Unconfirmed results.  The physician may request (order) an Unconfirmed Positive result to be sent out for confirmation.      Negative Thresholds for Drugs Screened:    THC screen, urine                          50 ng/ml  Phenycyclidine (PCP), urine                25 ng/ml  Cocaine screen, urine                     150 ng/ml  Methamphetamine, urine                    500 ng/ml  Opiate screen, urine                      100 ng/ml  Amphetamine screen, urine                 500 ng/ml  Benzodiazepine screen, urine              150 ng/ml  Tricyclic Antidepressants screen, urine   300 ng/ml  Methadone screen, urine                   200 ng/ml  Barbiturates screen, urine                200 ng/ml  Oxycodone screen,  urine                   100 ng/ml  Propoxyphene screen, urine                300 ng/ml  Buprenorphine screen, urine                10 ng/ml        ECG 12 Lead   Preliminary Result   HEART RATE= 104  bpm   RR Interval= 576  ms   ID Interval= 195  ms   P Horizontal Axis= 33  deg   P Front Axis= 68  deg   QRSD Interval= 97  ms   QT Interval= 345  ms   QRS Axis= 45  deg   T Wave Axis= 25  deg   - OTHERWISE NORMAL ECG -   Sinus tachycardia   Ventricular premature complex   Electronically Signed By:    Date and Time of Study: 2020-10-08 15:04:36           Xr Femur 2 View Right    Result Date: 10/8/2020  Comminuted intertrochanteric right femur fracture.  This report was finalized on 10/8/2020 3:07 PM by Dr. Fermin Choi MD.      Xr Chest 1 View    Result Date: 10/8/2020  Negative chest.  This report was finalized on 10/8/2020 3:09 PM by Dr. Fermin Choi MD.      Xr Hip With Or Without Pelvis 2 - 3 View Right    Result Date: 10/8/2020  Comminuted intertrochanteric right femur fracture.  This report was finalized on 10/8/2020 3:07 PM by Dr. Fermin Choi MD.      ASSESSMENT/PLAN:  Acute right comminuted intertrochanteric femur fracture: orthopedic surgery to see  NPO in case of ability to go to OR today  IV/oral pain medications continued  Monitor     Chronic pancytopenia/macrocytic anemia with coagulopathy:  Recent melena/ABLA/GIB:  INR 1.42 on admit, repeat pending   Hgb 9.7 on admit, repeat pending  Platelets 114,000 on admit, repeat pending  Continue protonix bid  Anemia panels without iron deficiency, chronic pattern, awaiting B12/folate  Consider need for vitamin K supplement if remains elevated and going to OR today  Monitor      Alcohol dependence with acute alcohol withdrawal, recent seizure:  Continue scheduled librium/CIWA protocol  Continue rally bag to completion  Continue telemetry/pulse ox/fall and seizure precautions  Monitor     Recent C diff Infection:POA  Continue oral vancomycin scheduled  "through 10/14/2020     Fatty liver disease: no current acute issues     Dysphagia: continue mechanical soft/thins when off NPO     Obesity: Body mass index is 33.91 kg/m².  Recent a1c/TSH normal    Patient is low cardiac risk for surgery    PLAN FOR DISPOSITION: KAT Ordoñez  Hospitalist, [facility]  10/09/20  05:01 EDT    \"Dictated utilizing Dragon dictation\"    "

## 2020-10-09 NOTE — ANESTHESIA PROCEDURE NOTES
Airway  Urgency: elective    Date/Time: 10/9/2020 4:16 PM  Airway not difficult    General Information and Staff    Patient location during procedure: OR  CRNA: Saud Gilbert CRNA    Indications and Patient Condition  Indications for airway management: airway protection    Preoxygenated: yes  Mask difficulty assessment: 0 - not attempted    Final Airway Details  Final airway type: endotracheal airway      Successful airway: ETT  Cuffed: yes   Successful intubation technique: direct laryngoscopy  Blade size: 2  ETT size (mm): 7.5  Cormack-Lehane Classification: grade IIa - partial view of glottis  Placement verified by: chest auscultation and capnometry   Measured from: lips  ETT/EBT  to lips (cm): 23  Number of attempts at approach: 1  Assessment: lips, teeth, and gum same as pre-op and atraumatic intubation

## 2020-10-09 NOTE — ANESTHESIA PREPROCEDURE EVALUATION
Anesthesia Evaluation     Patient summary reviewed and Nursing notes reviewed   no history of anesthetic complications:  NPO Solid Status: > 8 hours  NPO Liquid Status: > 8 hours           Airway   Mallampati: II  TM distance: >3 FB  Neck ROM: full  No difficulty expected  Dental          Pulmonary - negative pulmonary ROS and normal exam    breath sounds clear to auscultation  Sleep apnea: snores.  Cardiovascular - negative cardio ROS    ECG reviewed  Rhythm: regular  Rate: abnormal        Neuro/Psych  (+) tremors (alcohol withdrawl tremors), psychiatric history Anxiety and Depression,     Seizures: 1 seizure one week ago with head injury. CT cleared.  GI/Hepatic/Renal/Endo    (+) obesity,  GERD poorly controlled,    Liver disease: ETOH cirrhosis.    Musculoskeletal     Abdominal   (+) obese,    Substance History   (+) alcohol use (Alcohol Abuse),      OB/GYN          Other   arthritis,      Blood dyscrasia: INR high -                Anesthesia Plan    ASA 3 - emergent     general     intravenous induction     Anesthetic plan, all risks, benefits, and alternatives have been provided, discussed and informed consent has been obtained with: patient.  Use of blood products discussed with patient  Consented to blood products.

## 2020-10-09 NOTE — ANESTHESIA POSTPROCEDURE EVALUATION
Patient: Simone Boykin    Procedure Summary     Date: 10/09/20 Room / Location:  [facility] OR 3 /  [facility] OR    Anesthesia Start: 1609 Anesthesia Stop: 1845    Procedure: HIP INTERTROCHANTERIC NAILING (Right Hip) Diagnosis:       Closed comminuted intertrochanteric fracture of right femur, initial encounter (CMS/Formerly [facility])      (Closed comminuted intertrochanteric fracture of right femur, initial encounter (CMS/Formerly [facility]) [S72.141A])    Surgeon: Adalberto Solo MD Provider: Saud Gilbert CRNA    Anesthesia Type: general ASA Status: 3 - Emergent          Anesthesia Type: general    Vitals  Vitals Value Taken Time   /74 10/09/20 1905   Temp 100.5 °F (38.1 °C) 10/09/20 1842   Pulse 90 10/09/20 1910   Resp 15 10/09/20 1900   SpO2 97 % 10/09/20 1910   Vitals shown include unvalidated device data.        Post Anesthesia Care and Evaluation    Patient location during evaluation: PACU  Patient participation: complete - patient participated  Level of consciousness: awake and alert  Pain score: 0  Pain management: adequate  Airway patency: patent  Anesthetic complications: No anesthetic complications  PONV Status: none  Cardiovascular status: acceptable  Respiratory status: acceptable  Hydration status: acceptable

## 2020-10-09 NOTE — PLAN OF CARE
Continued Stay Note  [facility]     Patient Name: Simone Boykin  MRN: [id number]  Today's Date: 10/9/2020    Admit Date: 10/8/2020    Discharge Plan       Row Name 10/09/20 1116       Plan    Plan  Home    Plan Comments  Spoke with Mr Boykin at bedside.  He lives bruce  home in [place] with his mother.  His father  5 years ago and Jose C moved in with his mother to help her.  He is very tremulous throughout the interview.  He is alert and oriented.  He uses [facility] in [place] for his pharmacy.  He does not have any DME or oxygen at home.  He does not have and advanced directive on fiel.  He is NPO for surgery this afternoon for his hip nailing.  Plan is uncertain.  Will continue to follow          Discharge Codes    No documentation.             Luiza Marr RN

## 2020-10-09 NOTE — PLAN OF CARE
Goal Outcome Evaluation:  Plan of Care Reviewed With: patient  Progress: no change  Outcome Summary: pt npo for or today.  morphine give for pain. pt stable and will continue to monitor.

## 2020-10-09 NOTE — NURSING NOTE
Discharge Planning Assessment  [facility]     Patient Name: Siomne Boykin  MRN: [id number]  Today's Date: 10/9/2020    Admit Date: 10/8/2020    Discharge Needs Assessment     Row Name 10/09/20 1110       Living Environment    Lives With  parent(s)    Name(s) of Who Lives With Patient  Mother Matthias Carlos    Current Living Arrangements  home/apartment/condo    Primary Care Provided by  parent(s)    Provides Primary Care For  no one, unable/limited ability to care for self    Family Caregiver if Needed  parent(s)    Family Caregiver Names  Mother Matthias Carlos    Quality of Family Relationships  helpful;involved;supportive    Able to Return to Prior Arrangements  -- Continue to assess       Resource/Environmental Concerns    Resource/Environmental Concerns  none       Transition Planning    Patient/Family Anticipates Transition to  home with family    Transportation Anticipated  family or friend will provide       Discharge Needs Assessment    Readmission Within the Last 30 Days  no previous admission in last 30 days [facility] admission from  to 10/6.    Equipment Currently Used at Home  walker, rolling    Concerns to be Addressed  substance/tobacco abuse/use;financial/insurance    Anticipated Changes Related to Illness  inability to care for self    Equipment Needed After Discharge  -- Continue to assess    Discharge Facility/Level of Care Needs  rehabilitation facility    Provided Post Acute Provider List?  Refused    Refused Provider List Comment  I don't need anything    Current Discharge Risk  financial support inadequate;substance use/abuse        Discharge Plan     Row Name 10/09/20 1115       Plan    Plan  Home    Plan Comments  Spoke with Mr Boykin at bedside.  He lives bruce  home in [place] with his mother.  His father  5 years ago and Jose C moved in with his mother to help her.  He is very tremulous throughout the interview.  He is alert and oriented.  He uses [facility] in [place] for his pharmacy.  He  does not have any DME or oxygen at home.  He does not have and advanced directive on file.  He is NPO for surgery this afternoon for his hip nailing.  Plan is uncertain.  Will continue to follow        Continued Care and Services - Admitted Since 10/8/2020    Coordination has not been started for this encounter.         Demographic Summary     Row Name 10/09/20 1110       General Information    Admission Type  inpatient    Arrived From  home    Referral Source  admission list    Reason for Consult  discharge planning;substance use concerns    Preferred Language  English     Used During This Interaction  no       Contact Information    Permission Granted to Share Info With          Functional Status    No documentation.       Psychosocial    No documentation.       Abuse/Neglect    No documentation.       Legal    No documentation.       Substance Abuse    No documentation.       Patient Forms    No documentation.           Luiza Marr RN

## 2020-10-09 NOTE — OP NOTE
DOS: 10/9/2020    PREOPERATIVE DIAGNOSES:   1. Right hip intertrochanteric fracture.     POSTOPERATIVE DIAGNOSES:   1. Right hip intertrochanteric fracture.     PROCEDURES PERFORMED:   1. Right hip intramedullary nail for treatment of intertrochanteric fracture.    SURGEON: Adalberto Solo MD     ASSISTANT: Axel    ANESTHESIA: general    ESTIMATED BLOOD LOSS: 100 mL    URINE OUTPUT: Not recorded.     FLUIDS: per anesthesia     DRAINS: None.   SPECIMENS: None.     COMPLICATIONS: None.     IMPLANTS: Synthes TFNA nail 11 x 130' x 400 mm, 110 mm lag screw, 5.0 x 42 and 48 mm locking screw distally.     INDICATIONS FOR PROCEDURE: The patient is a pleasant 50-year-old male who tripped and fell landed on his right lower extremity. Noted immediate onset of pain at that time. Evaluation in the emergency department indicated a hip intertrochanteric fracture. The patient was admitted to the medicine service. Orthopedics was consulted. I discussed treatment options with the patient and family. The plan was for fixation of hip fracture with intramedullary implant and all associated procedures. Patient and family were in agreement with this and had all questions answered prior to signing the operative consent form. Patient was explained details of the procedure as well as risks, benefits, and alternatives as documented on the history and physical.    DESCRIPTION OF PROCEDURE: The patient was seen, evaluated, and cleared for surgery by Anesthesia, was met in the preoperative holding area, operative site was marked, consent was reviewed, history and physical was updated, and  preoperative labs were reviewed. Patient had been medically optimized by the medicine service. A regional block was then placed per Anesthesia. The patient was then taken to the operating room and placed in a supine position on the [place] fracture table. After successful intubation per anesthesia, a well padded perineal post was placed. Tentative reduction  was carried out with both legs placed in well-padded boots. Traction applied to right lower extremity. C-arm fluoroscopy was used to confirm appropriate closed reduction at this point in time. The right hip was then sterilely prepped and draped in a standard fashion.   Formal timeout was completed including confirmation of history and physical, operative consent, surgical site, patient identification number, and preoperative antibiotic administration. The procedure was then begun with a 8 cm incision over the proximal aspect of the right hip followed by insertion of a guidewire. Guidewire was inserted down to the level of the lesser trochanter with confirmation of appropriate position of the guidewire and reduction of the hip fracture under C-arm fluoroscopy on AP, lateral, and oblique views.   Opening reamer was then passed through a soft tissue guide down to the level of the lesser trochanter. Ball-tipped guidewire was placed with distal position noted to be appropriate under C-arm on AP and lateral views. Depth gauge was then used to measure the overall length of the nail. Reaming was begun at this time over the ball tipped guide moses, starting with a 9 mm reamer and progressing in 0.5 mm increments up to a 12.5 mm reamer. An 11 mm x 400 mm, 130 degree nail was then opened on the back table, attached to the insertion handle, and jig for cephalomedullary screw was noted to be well aligned.   The nail was then inserted over the ball-tipped guidewire. The guidewire was removed at this point in time. Trocar for the lag screw was placed, lateral incision made and trocar advanced to the lateral cortex. Guide pin was then fired in a center/center position in the femoral head on AP, lateral, and multiple oblique views, confirming no perforation through the femoral head. Length was measured. Drill was passed for the guide pin and a 110 mm lag screw was then placed at this time. Traction was released, followed by  compression of the fracture site. Set screw was then tightened proximally, followed by release of a quarter turn to allow for dynamic compression.   Attention was then turned to the distal end of the nail where perfect circles were achieved of the distal interlocking holes. Percutaneous incision was then made, drill advanced in bicortical fashion through interlocking hole confirmed on lateral C-arm image, and length measured based on AP image. Bicortical interlocking screw was then placed. AP image confirmed appropriate length.  A second distal interlocking screw was also placed in likewise fashion with good secure bicortical bite noted following insertion. The jig was then removed from the nail proximally.    Final fluoroscopic imaging was taken at this point in time confirming appropriate reduction and stabilization of right hip fracture, as well as placement of the implant. Wounds were then thoroughly irrigated with normal saline. Attention was turned to closure of the wounds with 0 Vicryl used for closure of the deep fascia layer, 2-0 Vicryl for subcutaneous closure, and 3-0  Monocryl for skin closure as well as Prineo mesh and glue. The wounds were dressed with Telfa, 4 x 4 gauze and Tegaderms.   At the end of the procedure, all lap, needle, and sponge counts were correct x 2. The patient had brisk capillary refill of all digits of the right lower extremity at the end of the procedure. The compartments were soft and easily compressible at this time.     DISPOSITION: The patient was extubated per Anesthesia and taken to the recovery room in stable condition. Will be admitted to the medicine service postoperatively for pain control. Begin DVT prophylaxis on postoperative day #1. Patient will be partial weightbearing on right lower extremity.  Results of the procedure were discussed immediately postoperatively with the patient's family and they had all questions answered at that time.       Adalberto Solo,  M.D.

## 2020-10-09 NOTE — CONSULTS
Orthopedic Consult      Patient: Simone Boykin    Date of Admission: 10/8/2020  1:32 PM    YOB: 1970    Medical Record Number: [id number]    Attending Physician: Radha Daugherty DO  Consulting Physician: Dr Solo      Chief Complaints: History of alcohol abuse [F10.11]  Closed comminuted intertrochanteric fracture of right femur, initial encounter (CMS/[facility]) [S72.141A]  Clostridioides difficile infection [A49.8]      History of Present Illness: 50 y.o. male admitted to [facility] to services of Radha Daugherty DO with History of alcohol abuse [F10.11]  Closed comminuted intertrochanteric fracture of right femur, initial encounter (CMS/[facility]) [S72.141A]  Clostridioides difficile infection [A49.8]. I was consulted for further evaluation and treatment. Onset of symptoms was yesterday morning when patient was at a pharmacy, was rotating per his report and tripped, landing on right hip with immediate onset of pain and inability to bear weight on right side.  He rated his pain as a 7 8 out of 10 at that point time currently rates it as a 9 out of 10 localized to the anterior groin and lateral hip region, exacerbated with any attempts at range of motion, minimal improvement with rest, mild improvement with IV pain medication.  He has had a recent hospital admission for alcohol abuse.  He also has been diagnosed with C. difficile and was recently been treated with oral vancomycin taper.  Does note some radiation of pain down into the right anterior thigh but denies any radiation down to the knee, denies associated numbness or tingling right lower extremity.  Denies antecedent right hip pain before this injury.    No Known Allergies    Medications:   Home Medications:  Medications Prior to Admission   Medication Sig Dispense Refill Last Dose   • folic acid (FOLVITE) 1 MG tablet Take 1 mg by mouth Daily.   10/7/2020   • Rebecca, Zingiber officinalis, (Rebecca Root) 250 MG capsule Take 250 mg by  "mouth Daily. Patient states he is unsure of dosage. \"whatever they sell at [employer]\"   10/7/2020   • Multiple Vitamins-Minerals (multivitamin and minerals) liquid liquid Take  by mouth Daily.   10/8/2020 at Unknown time   • vancomycin (VANCOCIN) 125 MG capsule Take 125 mg by mouth 4 (Four) Times a Day.   10/9/2020 at Unknown time   • Magnesium Oxide 400 (240 Mg) MG tablet Take 400 mg by mouth Daily.   Unknown at Unknown time       Current Medications:  Scheduled Meds:[MAR Hold] chlordiazePOXIDE, 10 mg, Oral, Q6H  famotidine, 40 mg, Oral, Daily  [MAR Hold] folic acid, 1 mg, Oral, Daily  [MAR Hold] Magnesium Oxide, 400 mg, Oral, Daily  [MAR Hold] multivitamin with minerals, 1 tablet, Oral, Daily  [MAR Hold] sodium chloride, 10 mL, Intravenous, Q12H  sodium chloride, 10 mL, Intravenous, Q12H  [MAR Hold] vancomycin, 125 mg, Oral, 4x Daily      Continuous Infusions:lactated ringers, 9 mL/hr, Last Rate: 9 mL/hr (10/09/20 1515)  lactated ringers, 100 mL/hr  sodium chloride, 75 mL/hr, Last Rate: 75 mL/hr (10/09/20 0748)      PRN Meds:.•  [MAR Hold] acetaminophen **OR** [MAR Hold] acetaminophen **OR** [MAR Hold] acetaminophen  •  [MAR Hold] bisacodyl  •  [MAR Hold] bisacodyl  •  BH OR LOCAL MIXTURE BUILDER  •  [MAR Hold] HYDROcodone-acetaminophen  •  HYDROmorphone  •  HYDROmorphone  •  lactated ringers  •  lidocaine PF 1%  •  [MAR Hold] LORazepam **OR** [MAR Hold] LORazepam **OR** [MAR Hold] LORazepam **OR** [MAR Hold] LORazepam **OR** [MAR Hold] LORazepam **OR** [MAR Hold] LORazepam  •  [MAR Hold] magnesium hydroxide  •  [MAR Hold] melatonin  •  meperidine  •  [MAR Hold] Morphine **AND** [MAR Hold] naloxone  •  [MAR Hold] nitroglycerin  •  [MAR Hold] ondansetron **OR** [MAR Hold] ondansetron  •  ondansetron  •  sodium chloride  •  [COMPLETED] Insert peripheral IV **AND** [MAR Hold] sodium chloride  •  [MAR Hold] sodium chloride  •  sodium chloride  •  [MAR Hold] sodium chloride  •  sodium chloride  •  BH OR ANTIBIOTIC " IRRIGATION BUILDER  •  sterile water       Past Medical History:   Diagnosis Date   • Anxiety    • Arthritis     JUAN KNEES   • Depression    • Fall    • GERD (gastroesophageal reflux disease)    • Heartburn     ON OCC   • Mass     BASE OF POSTERIOR NECK   • Shoulder dislocation     LONG TERM ISSUES WITH        Past Surgical History:   Procedure Laterality Date   • EXCISION MASS HEAD/NECK N/A 4/20/2016    Procedure: MASS SOFT TISSUE EXCISION POSTERIOR NECK;  Surgeon: Crow Ayala Jr., MD;  Location: [facility];  Service:    • KNEE ARTHROSCOPY Right         Social History     Occupational History   • Not on file   Tobacco Use   • Smoking status: Never Smoker   • Smokeless tobacco: Never Used   Substance and Sexual Activity   • Alcohol use: Yes     Comment: states no longer drinks daily   • Drug use: No   • Sexual activity: Defer      Social History     Social History Narrative   • Not on file        Family History   Problem Relation Age of Onset   • Heart disease Father    • Thyroid disease Brother          Review of Systems:   HEENT: Patient denies any headaches, vision changes, change in hearing, or tinnitus, Patient denies any rhinorrhea,epistaxis, sinus pain, mouth or dental problems, sore throat or hoarseness, or dysphagia  Pulmonary: Patient denies any cough, congestion, SOA, or wheezing  Cardiovascular: Patient denies any chest pain, dyspnea, palpitations, weakness, intolerance of exercise, varicosities, swelling of extremities, known murmur  Gastrointestinal:  Patient denies nausea, vomiting, diarrhea, constipation, loss  of appetite, change in appetite, dysphagia, gas, heartburn, melena, change in bowel habits, use of laxatives or other drugs to alter the function of the gastrointestinal tract.  Genital/Urinary: Patient denies dysuria, change in color of urine, change in frequency of urination, pain with urgency, incontinence, retention, or nocturia.  Musculoskeletal: Patient denies increased warmth;  redness; or swelling of joints; limitation of function; deformity; crepitation: notes pain in right hip and groin as documented above in HPI.  Denies pain in low back or neck.    Neurological: Patient denies dizziness, tremor, ataxia, difficulty in speaking, change in speech, paresthesia, loss of sensation, seizures, syncope, changes in memory.  Endocrine system: Patient denies tremors, palpitations, intolerance of heat or cold, polyuria, polydipsia, polyphagia, diaphoresis, exophthalmos, or goiter.  Psychological: Patient denies thoughts/plans or harming self or other; depression,  insomnia, night terrors, valentín, memory loss, disorientation.  Skin: Patient denies any bruising, rashes, discoloration, pruritus, wounds, ulcers, decubiti, changes in the hair or nails  Hematopoietic: Patient denies history of spontaneous or excessive bleeding, epistaxis, hematuria, melena, fatigue, enlarged or tender lymph nodes, pallor, history of anemia.    Physical Exam: 50 y.o. male  Vitals:    10/08/20 2305 10/09/20 0540 10/09/20 1142 10/09/20 1500   BP: 123/75 140/86 139/79 135/87   BP Location: Right arm Right arm Right arm    Patient Position: Lying Lying Lying    Pulse: 105 97 95 103   Resp: 20 16 18 14   Temp: 98.9 °F (37.2 °C) 99 °F (37.2 °C) 98 °F (36.7 °C) 98.1 °F (36.7 °C)   TempSrc: Oral Oral Oral Infrared   SpO2: 97% 98% 98% 93%   Weight:       Height:         General Appearance:    Alert, cooperative, in no acute distress                      Head:    Normocephalic, without obvious abnormality, atraumatic   Eyes:            Lids and lashes normal, conjunctivae and sclerae normal, no   icterus, no pallor, corneas clear, PERRLA   Ears:    Ears appear intact with no abnormalities noted   Throat:   No oral lesions, no thrush, oral mucosa moist   Neck:   No adenopathy, supple, trachea midline, no thyromegaly, no   carotid bruit, no JVD   Back:     No kyphosis present, no scoliosis present, no skin lesions,    erythema or  scars, no tenderness to percussion or palpation,   range of motion normal   Lungs:     Clear to auscultation,respirations regular, even and                  unlabored    Heart:    Regular rhythm and normal rate, normal S1 and S2, no            murmur, no gallop, no rub, no click   Chest Wall:    No abnormalities observed   Abdomen:     Normal bowel sounds, no masses, no organomegaly, soft     nontender, nondistended, no guarding, no rebound                tenderness   Rectal:     Deferred   Extremities:   Tenderness noted at right hip and groin region, exacerbated with minimal logroll exam, moderate soft tissue swelling noted with mild ecchymosis over lateral hip.  No evidence of open wounds, lacerations, or abrasions.  Patient refuses to range knee secondary to pain.  Is able to dorsiflex and plantarflex right ankle as well as flex and extend toes right foot with 4 out of 5 strength limited secondary to referred pain to the hip.  Positive sensation light touch all distributions of the right foot symmetric to left, 1+ dorsalis pedis pulse.  Moves all remaining extremities well, no edema, no cyanosis, no redness   Pulses:   Pulses palpable and equal bilaterally   Skin:   No bleeding, bruising or rash   Lymph nodes:   No palpable adenopathy   Neurologic:   Cranial nerves 2 - 12 grossly intact, sensation intact, DTR       present and equal bilaterally           Diagnostic Tests:  Admission on 10/08/2020   Component Date Value Ref Range Status   • Glucose 10/08/2020 135* 65 - 99 mg/dL Final   • BUN 10/08/2020 13  6 - 20 mg/dL Final   • Creatinine 10/08/2020 1.10  0.76 - 1.27 mg/dL Final   • Sodium 10/08/2020 138  136 - 145 mmol/L Final   • Potassium 10/08/2020 4.2  3.5 - 5.2 mmol/L Final   • Chloride 10/08/2020 104  98 - 107 mmol/L Final   • CO2 10/08/2020 24.2  22.0 - 29.0 mmol/L Final   • Calcium 10/08/2020 9.0  8.6 - 10.5 mg/dL Final   • Total Protein 10/08/2020 5.9* 6.0 - 8.5 g/dL Final   • Albumin 10/08/2020 2.70*  3.50 - 5.20 g/dL Final   • ALT (SGPT) 10/08/2020 18  1 - 41 U/L Final   • AST (SGOT) 10/08/2020 28  1 - 40 U/L Final   • Alkaline Phosphatase 10/08/2020 73  39 - 117 U/L Final   • Total Bilirubin 10/08/2020 1.2  0.0 - 1.2 mg/dL Final   • eGFR Non African Amer 10/08/2020 71  >60 mL/min/1.73 Final   • Globulin 10/08/2020 3.2  gm/dL Final   • A/G Ratio 10/08/2020 0.8  g/dL Final   • BUN/Creatinine Ratio 10/08/2020 11.8  7.0 - 25.0 Final   • Anion Gap 10/08/2020 9.8  5.0 - 15.0 mmol/L Final   • Protime 10/08/2020 16.9* 12.1 - 15.0 Seconds Final   • INR 10/08/2020 1.42* 0.90 - 1.10 Final   • PTT 10/08/2020 33.3  24.3 - 38.1 seconds Final   • Ethanol 10/08/2020 <10  0 - 10 mg/dL Final   • Ethanol % 10/08/2020 <0.010  % Final   • THC, Screen, Urine 10/08/2020 Negative  Negative Final   • Phencyclidine (PCP), Urine 10/08/2020 Negative  Negative Final   • Cocaine Screen, Urine 10/08/2020 Negative  Negative Final   • Methamphetamine, Ur 10/08/2020 Negative  Negative Final   • Opiate Screen 10/08/2020 Negative  Negative Final   • Amphetamine Screen, Urine 10/08/2020 Negative  Negative Final   • Benzodiazepine Screen, Urine 10/08/2020 Positive* Negative Final   • Tricyclic Antidepressants Screen 10/08/2020 Negative  Negative Final   • Methadone Screen, Urine 10/08/2020 Negative  Negative Final   • Barbiturates Screen, Urine 10/08/2020 Negative  Negative Final   • Oxycodone Screen, Urine 10/08/2020 Negative  Negative Final   • Propoxyphene Screen 10/08/2020 Negative  Negative Final   • Buprenorphine, Screen, Urine 10/08/2020 Negative  Negative Final   • ABO Type 10/08/2020 O   Final   • RH type 10/08/2020 Positive   Final   • Antibody Screen 10/08/2020 Negative   Final   • T&S Expiration Date 10/08/2020 10/11/2020 11:59:59 PM   Final   • WBC 10/08/2020 5.25  3.40 - 10.80 10*3/mm3 Final   • RBC 10/08/2020 2.77* 4.14 - 5.80 10*6/mm3 Final   • Hemoglobin 10/08/2020 8.7* 13.0 - 17.7 g/dL Final   • Hematocrit 10/08/2020 27.5* 37.5 -  51.0 % Final   • MCV 10/08/2020 99.3* 79.0 - 97.0 fL Final   • MCH 10/08/2020 31.4  26.6 - 33.0 pg Final   • MCHC 10/08/2020 31.6  31.5 - 35.7 g/dL Final   • RDW 10/08/2020 14.8  12.3 - 15.4 % Final   • RDW-SD 10/08/2020 54.1* 37.0 - 54.0 fl Final   • MPV 10/08/2020 10.0  6.0 - 12.0 fL Final   • Platelets 10/08/2020 114* 140 - 450 10*3/mm3 Final   • Neutrophil % 10/08/2020 70.4  42.7 - 76.0 % Final   • Lymphocyte % 10/08/2020 13.9* 19.6 - 45.3 % Final   • Monocyte % 10/08/2020 12.6* 5.0 - 12.0 % Final   • Eosinophil % 10/08/2020 1.7  0.3 - 6.2 % Final   • Basophil % 10/08/2020 0.4  0.0 - 1.5 % Final   • Immature Grans % 10/08/2020 1.0* 0.0 - 0.5 % Final   • Neutrophils, Absolute 10/08/2020 3.70  1.70 - 7.00 10*3/mm3 Final   • Lymphocytes, Absolute 10/08/2020 0.73  0.70 - 3.10 10*3/mm3 Final   • Monocytes, Absolute 10/08/2020 0.66  0.10 - 0.90 10*3/mm3 Final   • Eosinophils, Absolute 10/08/2020 0.09  0.00 - 0.40 10*3/mm3 Final   • Basophils, Absolute 10/08/2020 0.02  0.00 - 0.20 10*3/mm3 Final   • Immature Grans, Absolute 10/08/2020 0.05  0.00 - 0.05 10*3/mm3 Final   • nRBC 10/08/2020 0.0  0.0 - 0.2 /100 WBC Final   • ABO Type 10/08/2020 O   Final   • RH type 10/08/2020 Positive   Final   • Creatine Kinase 10/08/2020 73  20 - 200 U/L Final   • Fibrinogen 10/09/2020 343  200 - 400 mg/dL Final   • Iron 10/08/2020 44* 59 - 158 mcg/dL Final   • Iron Saturation 10/08/2020 22  20 - 50 % Final   • UIBC 10/08/2020 160  112 - 346 mcg/dL Final   • TIBC 10/08/2020 204* 298 - 536 mcg/dL Final   • Ferritin 10/08/2020 263.00  30.00 - 400.00 ng/mL Final   • Vitamin B-12 10/09/2020 1,317* 211 - 946 pg/mL Final   • Folate 10/09/2020 15.90  4.78 - 24.20 ng/mL Final   • TSH 10/08/2020 0.845  0.270 - 4.200 uIU/mL Final   • Hemoglobin A1C 10/08/2020 4.89  4.80 - 5.60 % Final   • ADENOVIRUS, PCR 10/08/2020 Not Detected  Not Detected Final   • Coronavirus 229E 10/08/2020 Not Detected  Not Detected Final   • Coronavirus HKU1 10/08/2020 Not  Detected  Not Detected Final   • Coronavirus NL63 10/08/2020 Not Detected  Not Detected Final   • Coronavirus OC43 10/08/2020 Not Detected  Not Detected Final   • COVID19 10/08/2020 Not Detected  Not Detected - Ref. Range Final   • Human Metapneumovirus 10/08/2020 Not Detected  Not Detected Final   • Human Rhinovirus/Enterovirus 10/08/2020 Not Detected  Not Detected Final   • Influenza A PCR 10/08/2020 Not Detected  Not Detected Final   • Influenza A H1 10/08/2020 Not Detected  Not Detected Final   • Influenza A H1 2009 PCR 10/08/2020 Not Detected  Not Detected Final   • Influenza A H3 10/08/2020 Not Detected  Not Detected Final   • Influenza B PCR 10/08/2020 Not Detected  Not Detected Final   • Parainfluenza Virus 1 10/08/2020 Not Detected  Not Detected Final   • Parainfluenza Virus 2 10/08/2020 Not Detected  Not Detected Final   • Parainfluenza Virus 3 10/08/2020 Not Detected  Not Detected Final   • Parainfluenza Virus 4 10/08/2020 Not Detected  Not Detected Final   • RSV, PCR 10/08/2020 Not Detected  Not Detected Final   • Bordetella pertussis pcr 10/08/2020 Not Detected  Not Detected Final   • Bordetella parapertussis PCR 10/08/2020 Not Detected  Not Detected Final   • Chlamydophila pneumoniae PCR 10/08/2020 Not Detected  Not Detected Final   • Mycoplasma pneumo by PCR 10/08/2020 Not Detected  Not Detected Final   • Glucose 10/09/2020 91  65 - 99 mg/dL Final   • BUN 10/09/2020 10  6 - 20 mg/dL Final   • Creatinine 10/09/2020 0.76  0.76 - 1.27 mg/dL Final   • Sodium 10/09/2020 134* 136 - 145 mmol/L Final   • Potassium 10/09/2020 3.7  3.5 - 5.2 mmol/L Final   • Chloride 10/09/2020 104  98 - 107 mmol/L Final   • CO2 10/09/2020 21.2* 22.0 - 29.0 mmol/L Final   • Calcium 10/09/2020 7.9* 8.6 - 10.5 mg/dL Final   • Total Protein 10/09/2020 5.4* 6.0 - 8.5 g/dL Final   • Albumin 10/09/2020 2.50* 3.50 - 5.20 g/dL Final   • ALT (SGPT) 10/09/2020 16  1 - 41 U/L Final   • AST (SGOT) 10/09/2020 24  1 - 40 U/L Final   •  Alkaline Phosphatase 10/09/2020 59  39 - 117 U/L Final   • Total Bilirubin 10/09/2020 1.1  0.0 - 1.2 mg/dL Final   • eGFR Non African Amer 10/09/2020 108  >60 mL/min/1.73 Final   • Globulin 10/09/2020 2.9  gm/dL Final   • A/G Ratio 10/09/2020 0.9  g/dL Final   • BUN/Creatinine Ratio 10/09/2020 13.2  7.0 - 25.0 Final   • Anion Gap 10/09/2020 8.8  5.0 - 15.0 mmol/L Final   • Protime 10/09/2020 17.0* 12.1 - 15.0 Seconds Final   • INR 10/09/2020 1.44* 0.90 - 1.10 Final   • WBC 10/09/2020 5.17  3.40 - 10.80 10*3/mm3 Final   • RBC 10/09/2020 2.69* 4.14 - 5.80 10*6/mm3 Final   • Hemoglobin 10/09/2020 8.4* 13.0 - 17.7 g/dL Final   • Hematocrit 10/09/2020 26.4* 37.5 - 51.0 % Final   • MCV 10/09/2020 98.1* 79.0 - 97.0 fL Final   • MCH 10/09/2020 31.2  26.6 - 33.0 pg Final   • MCHC 10/09/2020 31.8  31.5 - 35.7 g/dL Final   • RDW 10/09/2020 14.6  12.3 - 15.4 % Final   • RDW-SD 10/09/2020 51.6  37.0 - 54.0 fl Final   • MPV 10/09/2020 10.0  6.0 - 12.0 fL Final   • Platelets 10/09/2020 118* 140 - 450 10*3/mm3 Final   • Neutrophil % 10/09/2020 65.1  42.7 - 76.0 % Final   • Lymphocyte % 10/09/2020 18.6* 19.6 - 45.3 % Final   • Monocyte % 10/09/2020 12.6* 5.0 - 12.0 % Final   • Eosinophil % 10/09/2020 2.5  0.3 - 6.2 % Final   • Basophil % 10/09/2020 0.2  0.0 - 1.5 % Final   • Immature Grans % 10/09/2020 1.0* 0.0 - 0.5 % Final   • Neutrophils, Absolute 10/09/2020 3.37  1.70 - 7.00 10*3/mm3 Final   • Lymphocytes, Absolute 10/09/2020 0.96  0.70 - 3.10 10*3/mm3 Final   • Monocytes, Absolute 10/09/2020 0.65  0.10 - 0.90 10*3/mm3 Final   • Eosinophils, Absolute 10/09/2020 0.13  0.00 - 0.40 10*3/mm3 Final   • Basophils, Absolute 10/09/2020 0.01  0.00 - 0.20 10*3/mm3 Final   • Immature Grans, Absolute 10/09/2020 0.05  0.00 - 0.05 10*3/mm3 Final   • nRBC 10/09/2020 0.0  0.0 - 0.2 /100 WBC Final   • Product Code 10/09/2020 [id number]   Final   • Unit Number 10/09/2020 [id number]   Final   • UNIT  ABO 10/09/2020 O   Final   • UNIT  RH  10/09/2020 POS   Final   • Crossmatch Interpretation 10/09/2020 Compatible   Final   • Dispense Status 10/09/2020 XM   Final   • Blood Expiration Date 10/09/2020 [id number]   Final   • Blood Type Barcode 10/09/2020 5100   Final   • Product Code 10/09/2020 [id number]   Final   • Unit Number 10/09/2020 [id number]   Final   • UNIT  ABO 10/09/2020 O   Final   • UNIT  RH 10/09/2020 POS   Final   • Crossmatch Interpretation 10/09/2020 Compatible   Final   • Dispense Status 10/09/2020 XM   Final   • Blood Expiration Date 10/09/2020 [id number]   Final   • Blood Type Barcode 10/09/2020 5100   Final     Xr Femur 2 View Right    Result Date: 10/8/2020  Impression: Comminuted intertrochanteric right femur fracture.  This report was finalized on 10/8/2020 3:07 PM by Dr. Fermin Choi MD.      Xr Chest 1 View    Result Date: 10/8/2020  Impression: Negative chest.  This report was finalized on 10/8/2020 3:09 PM by Dr. Fermin Choi MD.      Xr Hip With Or Without Pelvis 2 - 3 View Right    Result Date: 10/8/2020  Impression: Comminuted intertrochanteric right femur fracture.  This report was finalized on 10/8/2020 3:07 PM by Dr. Fermin Choi MD.        Assessment:  Patient Active Problem List   Diagnosis   • Neck mass   • Anxiety   • Pain of left lower extremity   • Swelling of lower leg   • Hematoma   • Closed comminuted intertrochanteric fracture of proximal end of right femur (CMS/[facility])           Plan:  The patient voiced understanding of the risks, benefits, and alternative forms of treatment that were discussed and the patient consents to proceed with treatment of right hip intertrochanteric fracture with intramedullary nail and all associated procedures. I reviewed details of the procedure as well as risks benefits and alternatives of the procedure with the patient with the risks including but not limited to neurovascular damage, bleeding, infection, loss of motion, weakness, stiffness, instability,  nonunion, malunion, chronic pain, failure of implant, avascular necrosis, perforation through the femoral head, hardware prominence, leg length discrepancy, DVT, pulmonary embolus, death, stroke, complex regional pain syndrome, myocardial infarction, need for additional procedures. Patient understood all these had all questions answered prior to consenting to proceed with surgery. No guarantees were given regarding results of the procedure.  We will plan to proceed with surgery pending medical optimization.  .       Adalberto Solo MD      Dictated utilizing Dragon dictation

## 2020-10-10 LAB
ANION GAP SERPL CALCULATED.3IONS-SCNC: 11.9 MMOL/L (ref 5–15)
BUN SERPL-MCNC: 10 MG/DL (ref 6–20)
BUN/CREAT SERPL: 12 (ref 7–25)
CALCIUM SPEC-SCNC: 7.9 MG/DL (ref 8.6–10.5)
CHLORIDE SERPL-SCNC: 105 MMOL/L (ref 98–107)
CO2 SERPL-SCNC: 17.1 MMOL/L (ref 22–29)
CREAT SERPL-MCNC: 0.83 MG/DL (ref 0.76–1.27)
GFR SERPL CREATININE-BSD FRML MDRD: 98 ML/MIN/1.73
GLUCOSE SERPL-MCNC: 195 MG/DL (ref 65–99)
HCT VFR BLD AUTO: 24.2 % (ref 37.5–51)
HGB BLD-MCNC: 7.9 G/DL (ref 13–17.7)
POTASSIUM SERPL-SCNC: 5 MMOL/L (ref 3.5–5.2)
SODIUM SERPL-SCNC: 134 MMOL/L (ref 136–145)

## 2020-10-10 PROCEDURE — 85014 HEMATOCRIT: CPT | Performed by: ORTHOPAEDIC SURGERY

## 2020-10-10 PROCEDURE — 85018 HEMOGLOBIN: CPT | Performed by: ORTHOPAEDIC SURGERY

## 2020-10-10 PROCEDURE — 97165 OT EVAL LOW COMPLEX 30 MIN: CPT

## 2020-10-10 PROCEDURE — 99232 SBSQ HOSP IP/OBS MODERATE 35: CPT | Performed by: INTERNAL MEDICINE

## 2020-10-10 PROCEDURE — 94799 UNLISTED PULMONARY SVC/PX: CPT

## 2020-10-10 PROCEDURE — 25010000002 VANCOMYCIN 1 G RECONSTITUTED SOLUTION 1 EACH VIAL: Performed by: ORTHOPAEDIC SURGERY

## 2020-10-10 PROCEDURE — 97110 THERAPEUTIC EXERCISES: CPT

## 2020-10-10 PROCEDURE — 97161 PT EVAL LOW COMPLEX 20 MIN: CPT

## 2020-10-10 PROCEDURE — 80048 BASIC METABOLIC PNL TOTAL CA: CPT | Performed by: ORTHOPAEDIC SURGERY

## 2020-10-10 RX ADMIN — FAMOTIDINE 40 MG: 20 TABLET, FILM COATED ORAL at 08:15

## 2020-10-10 RX ADMIN — APIXABAN 2.5 MG: 2.5 TABLET, FILM COATED ORAL at 20:09

## 2020-10-10 RX ADMIN — SODIUM CHLORIDE, PRESERVATIVE FREE 10 ML: 5 INJECTION INTRAVENOUS at 08:16

## 2020-10-10 RX ADMIN — MULTIPLE VITAMINS W/ MINERALS TAB 1 TABLET: TAB at 08:15

## 2020-10-10 RX ADMIN — Medication 400 MG: at 08:16

## 2020-10-10 RX ADMIN — APIXABAN 2.5 MG: 2.5 TABLET, FILM COATED ORAL at 08:15

## 2020-10-10 RX ADMIN — CHLORDIAZEPOXIDE HYDROCHLORIDE 10 MG: 5 CAPSULE ORAL at 11:25

## 2020-10-10 RX ADMIN — CHLORDIAZEPOXIDE HYDROCHLORIDE 10 MG: 5 CAPSULE ORAL at 17:14

## 2020-10-10 RX ADMIN — CHLORDIAZEPOXIDE HYDROCHLORIDE 10 MG: 5 CAPSULE ORAL at 05:00

## 2020-10-10 RX ADMIN — VANCOMYCIN HYDROCHLORIDE 125 MG: 125 CAPSULE ORAL at 20:09

## 2020-10-10 RX ADMIN — FOLIC ACID 1 MG: 1 TABLET ORAL at 08:15

## 2020-10-10 RX ADMIN — VANCOMYCIN HYDROCHLORIDE 125 MG: 125 CAPSULE ORAL at 17:14

## 2020-10-10 RX ADMIN — SODIUM CHLORIDE, PRESERVATIVE FREE 10 ML: 5 INJECTION INTRAVENOUS at 20:09

## 2020-10-10 RX ADMIN — VANCOMYCIN HYDROCHLORIDE 125 MG: 125 CAPSULE ORAL at 08:15

## 2020-10-10 RX ADMIN — HYDROCODONE BITARTRATE AND ACETAMINOPHEN 1 TABLET: 5; 325 TABLET ORAL at 05:00

## 2020-10-10 RX ADMIN — SODIUM CHLORIDE, POTASSIUM CHLORIDE, SODIUM LACTATE AND CALCIUM CHLORIDE 100 ML/HR: 600; 310; 30; 20 INJECTION, SOLUTION INTRAVENOUS at 03:18

## 2020-10-10 RX ADMIN — VANCOMYCIN HYDROCHLORIDE 125 MG: 125 CAPSULE ORAL at 11:25

## 2020-10-10 RX ADMIN — LORAZEPAM 1 MG: 1 TABLET ORAL at 05:00

## 2020-10-10 RX ADMIN — OXYCODONE HYDROCHLORIDE AND ACETAMINOPHEN 2 TABLET: 7.5; 325 TABLET ORAL at 20:14

## 2020-10-10 NOTE — PLAN OF CARE
Goal Outcome Evaluation:  Plan of Care Reviewed With: patient  Progress: improving  Outcome Summary: Right hip moses placed, drsg intact.

## 2020-10-10 NOTE — PLAN OF CARE
Problem: Fall Injury Risk  Goal: Absence of Fall and Fall-Related Injury  Outcome: Ongoing, Progressing     Problem: Fall Injury Risk  Goal: Absence of Fall and Fall-Related Injury  Intervention: Promote Injury-Free Environment  Recent Flowsheet Documentation  Taken 10/10/2020 1000 by Gurvinder Dunne RN  Safety Promotion/Fall Prevention: safety round/check completed  Taken 10/10/2020 0800 by Gurvinder Dunne RN  Safety Promotion/Fall Prevention: safety round/check completed  Taken 10/10/2020 0705 by Gurvinder Dunne RN  Safety Promotion/Fall Prevention: safety round/check completed     Problem: Skin Injury Risk Increased  Goal: Skin Health and Integrity  Outcome: Ongoing, Progressing     Problem: Adult Inpatient Plan of Care  Goal: Absence of Hospital-Acquired Illness or Injury  Intervention: Identify and Manage Fall Risk  Recent Flowsheet Documentation  Taken 10/10/2020 1000 by Gurvinder Dunne RN  Safety Promotion/Fall Prevention: safety round/check completed  Taken 10/10/2020 0800 by Gurvinder Dunne RN  Safety Promotion/Fall Prevention: safety round/check completed  Taken 10/10/2020 0705 by Gurvinder Dunne RN  Safety Promotion/Fall Prevention: safety round/check completed   Goal Outcome Evaluation:  Plan of Care Reviewed With: patient  Progress: improving

## 2020-10-10 NOTE — PLAN OF CARE
Problem: Adult Inpatient Plan of Care  Goal: Plan of Care Review  Recent Flowsheet Documentation  Taken 10/10/2020 0927 by Nanette Rose, PT  Plan of Care Reviewed With: patient  Outcome Summary: PT evaluation completed this am. Patient needed Mod A of 2 for supine to sit transfers as well as extended time to complete. He transferred sit to stand with mod/max A of 2 from elevated bed surface with FWW and cueing for hand placement and cueing for PWB right leg.  He took about 4-5 steps from bed to recliner chair with minimal assist of 2 and cueing. Cueing needed for proper distance from walker. Patient with significant tremors throughout body that affects his mobility and gait and safety with mobility.  Patient with no reports of any pain throughout evaluation. Patient will benefitf from skilled PT while in hospital for functional mobility and gait training with FWW, PWB right as well as right LE exercises as tolerated with HEP and patient education. Patient would benefit from a STR stay prior to going home. Patient agreeable to this.

## 2020-10-10 NOTE — THERAPY EVALUATION
Acute Care - Occupational Therapy Initial Evaluation   Noris Ballesteros     Patient Name: Simone Boykin  : 1970  MRN: [id number]  Today's Date: 10/10/2020  Onset of Illness/Injury or Date of Surgery: 10/08/20  Date of Referral to OT: 10/09/20  Referring Physician: Dr. Solo    Admit Date: 10/8/2020       ICD-10-CM ICD-9-CM   1. Closed comminuted intertrochanteric fracture of right femur, initial encounter (CMS/[facility])  S72.141A 820.21   2. History of alcohol abuse  F10.11 305.03   3. Clostridioides difficile infection  A49.8 041.84     Patient Active Problem List   Diagnosis   • Neck mass   • Anxiety   • Pain of left lower extremity   • Swelling of lower leg   • Hematoma   • Closed comminuted intertrochanteric fracture of proximal end of right femur (CMS/[facility])     Past Medical History:   Diagnosis Date   • Anxiety    • Arthritis     JUAN KNEES   • Depression    • Fall    • GERD (gastroesophageal reflux disease)    • Heartburn     ON OCC   • Mass     BASE OF POSTERIOR NECK   • Shoulder dislocation     LONG TERM ISSUES WITH     Past Surgical History:   Procedure Laterality Date   • EXCISION MASS HEAD/NECK N/A 2016    Procedure: MASS SOFT TISSUE EXCISION POSTERIOR NECK;  Surgeon: Crow Ayala Jr., MD;  Location: [facility] OR;  Service:    • KNEE ARTHROSCOPY Right           OT ASSESSMENT FLOWSHEET (last 12 hours)      OT Evaluation and Treatment     Row Name 10/10/20 0926                   OT Time and Intention    Subjective Information  no complaints  -EN        Document Type  evaluation  -EN        Mode of Treatment  occupational therapy  -EN        Patient Effort  good  -EN           General Information    Patient Profile Reviewed  yes  -EN        Onset of Illness/Injury or Date of Surgery  10/08/20  -EN        Referring Physician  Dr. Solo  -EN        Patient/Family/Caregiver Comments/Observations  Patient reclined in bed, agreed to evaluation  -EN        Prior Level of Function  independent:;all  household mobility;ADL's  -EN        Equipment Currently Used at Home  walker, rolling stated he's getting a shower chair Monday  -EN        Pertinent History of Current Functional Problem  Patient seen at [facility] on 9/28 after a fall. Patient discharged home with a RW and suffered another fall at [facility] resulting in a R hip fracture. Patient is now s/p R hip intramedullary nailing of intertrochanteric fracture and is partial weight bearing. At baseline patient lives with mother in a 2 level home and reported prior to falls was ind. with ADLs and mobility  -EN        Existing Precautions/Restrictions  fall;weight bearing Partial WB  -EN        Limitations/Impairments  other (see comments) tremors from alcohol withdrawl  -EN        Risks Reviewed  patient:;LOB;increased discomfort  -EN        Benefits Reviewed  patient:;improve function;increase independence;increase strength;increase balance  -EN        Barriers to Rehab  -- ETOH abuse  -EN           Occupational Profile    Reason for Services/Referral (Occupational Profile)  decreaed ADL ind  -EN           Previous Level of Function/Home Environm    BADLs, Premorbid Functional Level  independent  -EN        IADLs, Premorbid Functional Level  independent has been recently using a RW from fall on 9/28/20  -EN           Living Environment    Current Living Arrangements  home/apartment/condo  -EN        Lives With  parent(s) mother  -EN           Cognition    Orientation Status (Cognition)  oriented x 3  -EN        Follows Commands (Cognition)  WFL  -EN        Personal Safety Interventions  gait belt;nonskid shoes/slippers when out of bed  -EN           Pain Assessment    Additional Documentation  Pain Scale: Numbers Pre/Post-Treatment (Group)  -EN           Pain Scale: Numbers Pre/Post-Treatment    Pretreatment Pain Rating  0/10 - no pain  -EN        Posttreatment Pain Rating  0/10 - no pain  -EN           Range of Motion Comprehensive    Comment, General Range  of Motion  B UE AROM WFL (tremors thoughout body)  -EN           Strength Comprehensive (MMT)    Comment, General Manual Muscle Testing (MMT) Assessment  B UE strength WFL  -EN           Bed Mobility    Bed Mobility  supine-sit  -EN        Supine-Sit [place] (Bed Mobility)  moderate assist (50% patient effort);2 person assist;verbal cues  -EN        Bed Mobility, Safety Issues  decreased use of legs for bridging/pushing  -EN        Assistive Device (Bed Mobility)  bed rails;head of bed elevated  -EN        Comment (Bed Mobility)  Extended time to complete  -EN           Functional Mobility    Functional Mobility- Comment  Once up in standing patient able to take 3-4 steps to chair with min assist X 2 with rolling walker.  -EN           Transfer Assessment/Treatment    Transfers  sit-stand transfer;stand-sit transfer  -EN        Maintains Weight-bearing Status (Transfers)  verbal cues to maintain  -EN        Comment (Transfers)  Verbal cues for hand placement and cues to maintain PWB  -EN           Transfers    Sit-Stand [place] (Transfers)  moderate assist (50% patient effort);maximum assist (25% patient effort);verbal cues;2 person assist  -EN        Stand-Sit [place] (Transfers)  minimum assist (75% patient effort);2 person assist;verbal cues  -EN           Sit-Stand Transfer    Assistive Device (Sit-Stand Transfers)  walker, front-wheeled  -EN           Activities of Daily Living    BADL Assessment/Intervention  lower body dressing  -EN           Lower Body Dressing Assessment/Training    Comment (Lower Body Dressing)  donned boxer shorts and pants with mod assist over LEs, Max A to pull up over hips in standing.  -EN           Wound 10/09/20 1700 Right lateral hip Incision    Wound - Properties Group Placement Date: 10/09/20  -ME Placement Time: 1700  -ME Side: Right  -ME Orientation: lateral  -ME Location: hip  -ME, hip and leg incisions  Primary Wound Type: Incision  -ME Additional Comments:  prineo, telfa, tegaderms  -ME    Retired Wound - Properties Group Date first assessed: 10/09/20  -ME Time first assessed: 1700  -ME Side: Right  -ME Location: hip  -ME, hip and leg incisions  Primary Wound Type: Incision  -ME Additional Comments: prineo, telfa, tegaderms  -ME       Plan of Care Review    Plan of Care Reviewed With  patient  -EN        Outcome Summary  OT evaluation completed. Patient required mod A X 2 for supine to sit (extended time required). Patient stood from elevated EOB with mod/max A x 2 and took 3-4 steps to the chair with min assist X 2 and rolling walker. Patient required mod/max assist for LB dressing (significant tremors thoughout body). Patient reported no pain thoughout evaluation. OT to follow while in hosptial to address ADLs/safety during transfers/mobility. Patient would benefit from short term rehab prior to return home. Patient agreeable.  -EN           OT Goals    Transfer Goal Selection (OT)  transfer, OT goal 1  -EN        Dressing Goal Selection (OT)  dressing, OT goal 1  -EN           Transfer Goal 1 (OT)    Activity/Assistive Device (Transfer Goal 1, OT)  sit-to-stand/stand-to-sit;toilet;commode, bedside without drop arms;walker, rolling  -EN        [place] Level/Cues Needed (Transfer Goal 1, OT)  minimum assist (75% or more patient effort)  -EN        Time Frame (Transfer Goal 1, OT)  by discharge  -EN        Progress/Outcome (Transfer Goal 1, OT)  other (see comments) new goal  -EN           Dressing Goal 1 (OT)    Activity/Device (Dressing Goal 1, OT)  lower body dressing;long-handled shoe horn;reacher;sock-aid  -EN        [place]/Cues Needed (Dressing Goal 1, OT)  minimum assist (75% or more patient effort)  -EN        Time Frame (Dressing Goal 1, OT)  by discharge  -EN        Progress/Outcome (Dressing Goal 1, OT)  other (see comments) new goal  -EN           Positioning and Restraints    Pre-Treatment Position  in bed  -EN        Post Treatment Position   chair  -EN        In Chair  reclined;call light within reach;encouraged to call for assist  -EN           Therapy Assessment/Plan (OT)    Date of Referral to OT  10/09/20  -EN        OT Diagnosis  decreased ADLs/ mobility  -EN        Rehab Potential (OT)  good, to achieve stated therapy goals  -EN        Criteria for Skilled Therapeutic Interventions Met (OT)  yes;skilled treatment is necessary  -EN        Therapy Frequency (OT)  5 times/wk  -EN        Predicted Duration of Therapy Intervention (OT)  will continue to assess, would benefit from STR  -EN        Problem List (OT)  problems related to;balance;mobility;other (see comments) ADL ind  -EN        Planned Therapy Interventions (OT)  BADL retraining;adaptive equipment training;transfer/mobility retraining  -EN           Evaluation Complexity (OT)    Review Occupational Profile/Medical/Therapy History Complexity  low complexity  -EN        Assessment, Occupational Performance/Identification of Deficit Complexity  low complexity  -EN        Clinical Decision Making Complexity (OT)  low complexity  -EN        Overall Complexity of Evaluation (OT)  low complexity  -EN          User Key  (r) = Recorded By, (t) = Taken By, (c) = Cosigned By    Initials Name Effective Dates    EN Rosalba Voss, OTR 07/05/20 -     ME Izabella Lyons, RN 04/10/20 -          Occupational Therapy Education                 Title: PT OT SLP Therapies (Done)     Topic: Occupational Therapy (Done)     Point: ADL training (Done)     Description:   Instruct learner(s) on proper safety adaptation and remediation techniques during self care or transfers.   Instruct in proper use of assistive devices.              Learning Progress Summary           Patient Acceptance, E, VU by EN at 10/10/2020 7631    Comment: Safety, transfers, PWB status                   Point: Precautions (Done)     Description:   Instruct learner(s) on prescribed precautions during self-care and functional transfers.               Learning Progress Summary           Patient Acceptance, OLGA VU by EN at 10/10/2020 9953    Comment: Safety, transfers, PWB status                               User Key     Initials Effective Dates Name Provider Type Discipline    EN 07/05/20 -  Rosalba Voss OTCLARA Occupational Therapist OT                  OT Recommendation and Plan  Planned Therapy Interventions (OT): BADL retraining, adaptive equipment training, transfer/mobility retraining  Therapy Frequency (OT): 5 times/wk  Plan of Care Review  Plan of Care Reviewed With: patient  Outcome Summary: OT evaluation completed. Patient required mod A X 2 for supine to sit (extended time required). Patient stood from elevated EOB with mod/max A x 2 and took 3-4 steps to the chair with min assist X 2 and rolling walker. Patient required mod/max assist for LB dressing (significant tremors thoughout body). Patient reported no pain thoughout evaluation. OT to follow while in hosptial to address ADLs/safety during transfers/mobility. Patient would benefit from short term rehab prior to return home. Patient agreeable.  Plan of Care Reviewed With: patient  Outcome Summary: OT evaluation completed. Patient required mod A X 2 for supine to sit (extended time required). Patient stood from elevated EOB with mod/max A x 2 and took 3-4 steps to the chair with min assist X 2 and rolling walker. Patient required mod/max assist for LB dressing (significant tremors thoughout body). Patient reported no pain thoughout evaluation. OT to follow while in hosptial to address ADLs/safety during transfers/mobility. Patient would benefit from short term rehab prior to return home. Patient agreeable.    Outcome Measures     Row Name 10/10/20 1000             How much help from another is currently needed...    Putting on and taking off regular lower body clothing?  2  -EN      Bathing (including washing, rinsing, and drying)  2  -EN      Toileting (which includes using toilet  bed pan or urinal)  2  -EN      Putting on and taking off regular upper body clothing  4  -EN      Taking care of personal grooming (such as brushing teeth)  4  -EN      Eating meals  4  -EN      AM-PAC 6 Clicks Score (OT)  18  -EN         Functional Assessment    Outcome Measure Options  AM-PAC 6 Clicks Daily Activity (OT)  -EN        User Key  (r) = Recorded By, (t) = Taken By, (c) = Cosigned By    Initials Name Provider Type    Rosalba Meraz OTR Occupational Therapist          Time Calculation:   Time Calculation- OT     Row Name 10/10/20 1056             Time Calculation- OT    OT Start Time  0926  -EN      OT Stop Time  1004  -EN      OT Time Calculation (min)  38 min  -EN        User Key  (r) = Recorded By, (t) = Taken By, (c) = Cosigned By    Initials Name Provider Type    Rosalba Meraz OTR Occupational Therapist        Therapy Charges for Today     Code Description Service Date Service Provider Modifiers Qty    [id number]  OT EVAL LOW COMPLEXITY 3 10/10/2020 Rosalba Voss OTR GO 1               COOPER Lima  10/10/2020

## 2020-10-10 NOTE — NURSING NOTE
"Continued Stay Note  SUSANNA Smith     Patient Name: Simone Boykin  MRN: [id number]  Today's Date: 10/10/2020    Admit Date: 10/8/2020    Discharge Plan     Row Name 10/10/20 1107       Plan    Plan Comments  Patient up in chair.  States \"I feel fine.\"  He would like to go home with home health.  Will continue to follow        Discharge Codes    No documentation.             Luiza Marr RN    "

## 2020-10-10 NOTE — PROGRESS NOTES
POD# 1 s/p right hip nail    Subjective: Patient states doing fairly well, did work with physical therapy today and was able to get to a chair.  He still is having some tremors particularly when he gets upright and is starting to do some activities.  Denies residual numbness or tingling to right lower extremity.  Denies any fevers chills or sweats overnight.    Objective:  Vitals:    10/09/20 2230 10/09/20 2330 10/10/20 0614 10/10/20 1121   BP: 94/66 131/85 102/68 113/73   BP Location: Right arm Right arm Right arm Right arm   Patient Position: Lying Lying Lying Sitting   Pulse: 75 83 89 99   Resp: 14 19 22 20   Temp: 97.9 °F (36.6 °C) 97.7 °F (36.5 °C) 98.3 °F (36.8 °C) 99.6 °F (37.6 °C)   TempSrc: Oral Oral Oral Oral   SpO2: 100% 98% 95% 97%   Weight:       Height:           Results from last 7 days   Lab Units 10/10/20  0538 10/09/20  2028 10/09/20  0958 10/08/20  1458  10/06/20  0938   WBC 10*3/mm3  --   --  5.17 5.25  --  2.93*   HEMOGLOBIN g/dL 7.9* 8.1* 8.4* 8.7*   < > 8.2*   HEMATOCRIT % 24.2* 24.9* 26.4* 27.5*   < > 24.6*   PLATELETS 10*3/mm3  --   --  118* 114*  --  94*    < > = values in this interval not displayed.       Results from last 7 days   Lab Units 10/10/20  0538 10/09/20  0958 10/08/20  1458   SODIUM mmol/L 134* 134* 138   POTASSIUM mmol/L 5.0 3.7 4.2   CHLORIDE mmol/L 105 104 104   CO2 mmol/L 17.1* 21.2* 24.2   BUN mg/dL 10 10 13   CREATININE mg/dL 0.83 0.76 1.10   GLUCOSE mg/dL 195* 91 135*   CALCIUM mg/dL 7.9* 7.9* 9.0       Exam:    Right lower extremity   Dressing clean dry and intact over lateral hip   Dorsiflex and plantarflex ankle and flex and extend toes    Positive sensation light touch all distributions   No calf pain, negative Homans sign    Impression: s/p right hip nail    Plan:  1. PT/OT-ambulate, partial weight-bearing right lower extremity  2. Pain control-Percocet   3. DVT prophylaxis-Eliquis  4. Wound care-leave dressing in place until follow-up in 2 weeks  5. Disposition-  home with home health therapy versus rehab-likely will need rehab at time of discharge.  Disposition per hospitalist

## 2020-10-10 NOTE — PROGRESS NOTES
"    [facility] HOSPITALIST TEAM   PROGRESS NOTE      Patient Care Team:  Liza Sapp PA-C as PCP - General (Family Medicine)    Patient seen at bedside    Hospitalist Team      Patient Care Team:  Provider, No Known as PCP - General          Subjective      Presenting History and Chief Complaints:      Chief Complaint:      Rt Hip Pain    Admission History:    The patient is a 50-year-old male that presented to the emergency department secondary to ground-level fall onto right hip with immediate pain and in ability to bear weight.  He notes that he was \"walking with his walker, turned and wheel caught, tripping me\".  X-Rays done in ED showed Comminuted intertrochanteric right femur fracture. Patient was admitted on Hospitalist service and Orthopedic consult was obtained from Dr Glover, who did Right hip intramedullary nail for treatment of intertrochanteric fracture yesterday. Patient is doing well after surgery.       Interval History and ROS:     Patient States He still has pain in rt hip but is under control  Patient Complaints: As above  Patient Denies:  Any sx of chest pain, shortness of breath, abdominal pain or n/v  History taken from: Patient      Objective    Vital Signs  Temp:  [97.5 °F (36.4 °C)-100.5 °F (38.1 °C)] 98.3 °F (36.8 °C)  Heart Rate:  [] 89  Resp:  [14-22] 22  BP: ()/(66-87) 102/68    Flowsheet Rows      First Filed Value   Admission Height  182 cm (71.65\") Documented at 10/08/2020 1321   Admission Weight  113 kg (250 lb) Documented at 10/08/2020 1321              PHYSICAL EXAMINATION:      VS: As documented above  PE: GEN - A&O x 3, in NAD   HEENT - Normocephalic, PERRL, EOMI   CV - RRR, S1+, S2+. with no m/r/g  RESP - CTAB x no rales or rhonchi    ABD - s/nt/nd, NABS, no HSMeg, no palpable masses   MS - MAEW(moves all extremities well) , 5/5 strength UE/LE   EXT - S/P Right hip intramedullary nail for treatment of intertrochanteric fracture.  No ankle/pedal " edema, cyanosis or clubbing. Coarse tremors of hands/fingers  NEURO - CN II-XII intact, normal motor and sensory examinations without any focal  neurologic deficits.  PSYCH - affect, mood congruent and appropriate       Results Review:     I reviewed the patient's new clinical results.    Lab Results (last 24 hours)     Procedure Component Value Units Date/Time    Hemoglobin & Hematocrit, Blood [[id number]]  (Abnormal) Collected: 10/10/20 0538    Specimen: Blood Updated: 10/10/20 0634     Hemoglobin 7.9 g/dL      Hematocrit 24.2 %     Basic Metabolic Panel [[id number]] Collected: 10/10/20 0538    Specimen: Blood Updated: 10/10/20 0625    Hemoglobin & Hematocrit, Blood [[id number]]  (Abnormal) Collected: 10/09/20 2028    Specimen: Blood Updated: 10/09/20 2040     Hemoglobin 8.1 g/dL      Hematocrit 24.9 %     Vitamin B12 [[id number]]  (Abnormal) Collected: 10/09/20 0958    Specimen: Blood Updated: 10/09/20 1706     Vitamin B-12 1,317 pg/mL     Narrative:      Results may be falsely increased if patient taking Biotin.      Folate [[id number]]  (Normal) Collected: 10/09/20 0958    Specimen: Blood Updated: 10/09/20 1706     Folate 15.90 ng/mL     Narrative:      Results may be falsely increased if patient taking Biotin.      Fibrinogen [[id number]]  (Normal) Collected: 10/09/20 0958    Specimen: Blood Updated: 10/09/20 1043     Fibrinogen 343 mg/dL     Protime-INR [[id number]]  (Abnormal) Collected: 10/09/20 0958    Specimen: Blood Updated: 10/09/20 1035     Protime 17.0 Seconds      INR 1.44    Narrative:      Therapeutic Ranges for INR: 2.0-3.0 (PT 20-30)                              2.5-3.5 (PT 25-34)    Comprehensive Metabolic Panel [[id number]]  (Abnormal) Collected: 10/09/20 0958    Specimen: Blood Updated: 10/09/20 1025     Glucose 91 mg/dL      BUN 10 mg/dL      Creatinine 0.76 mg/dL      Sodium 134 mmol/L      Potassium 3.7 mmol/L      Chloride 104 mmol/L      CO2 21.2 mmol/L      Calcium 7.9 mg/dL      Total Protein  5.4 g/dL      Albumin 2.50 g/dL      ALT (SGPT) 16 U/L      AST (SGOT) 24 U/L      Alkaline Phosphatase 59 U/L      Total Bilirubin 1.1 mg/dL      eGFR Non African Amer 108 mL/min/1.73      Globulin 2.9 gm/dL      A/G Ratio 0.9 g/dL      BUN/Creatinine Ratio 13.2     Anion Gap 8.8 mmol/L     Narrative:      GFR Normal >60  Chronic Kidney Disease <60  Kidney Failure <15      CBC & Differential [[id number]]  (Abnormal) Collected: 10/09/20 0958    Specimen: Blood Updated: 10/09/20 1006    Narrative:      The following orders were created for panel order CBC & Differential.  Procedure                               Abnormality         Status                     ---------                               -----------         ------                     CBC Auto Differential[[id number]]        Abnormal            Final result                 Please view results for these tests on the individual orders.    CBC Auto Differential [[id number]]  (Abnormal) Collected: 10/09/20 0958    Specimen: Blood Updated: 10/09/20 1006     WBC 5.17 10*3/mm3      RBC 2.69 10*6/mm3      Hemoglobin 8.4 g/dL      Hematocrit 26.4 %      MCV 98.1 fL      MCH 31.2 pg      MCHC 31.8 g/dL      RDW 14.6 %      RDW-SD 51.6 fl      MPV 10.0 fL      Platelets 118 10*3/mm3      Neutrophil % 65.1 %      Lymphocyte % 18.6 %      Monocyte % 12.6 %      Eosinophil % 2.5 %      Basophil % 0.2 %      Immature Grans % 1.0 %      Neutrophils, Absolute 3.37 10*3/mm3      Lymphocytes, Absolute 0.96 10*3/mm3      Monocytes, Absolute 0.65 10*3/mm3      Eosinophils, Absolute 0.13 10*3/mm3      Basophils, Absolute 0.01 10*3/mm3      Immature Grans, Absolute 0.05 10*3/mm3      nRBC 0.0 /100 WBC           Imaging Results (Last 24 Hours)     Procedure Component Value Units Date/Time    XR Hip With or Without Pelvis 2 - 3 View Right [[id number]] Collected: 10/09/20 1833     Updated: 10/09/20 1836    Narrative:      CR Hip Uni Comp Min 2 Vws RT    INDICATION:   Intraoperative  radiograph    COMPARISON:   None.    FINDINGS:  Series of fluoroscopic images of open reduction internal fixation of the right femur. See operative report.    IMPRESSION  Series of fluoroscopic images of open reduction internal fixation of the right femur. See operative report    Signer Name: AMY VIEIRA MD   Signed: 10/9/2020 6:33 PM   Workstation Name: [email or web address]    [facility] C Arm During Surgery [[id number]] Resulted: 10/09/20 1830     Updated: 10/09/20 1832          Xray reviewed personally by physician.      ECG reviewed personally by physician  ECG/EMG Results (most recent)     Procedure Component Value Units Date/Time    ECG 12 Lead [[id number]] Collected: 10/08/20 1504     Updated: 10/09/20 1552    Narrative:      HEART RATE= 104  bpm  RR Interval= 576  ms  NJ Interval= 195  ms  P Horizontal Axis= 33  deg  P Front Axis= 68  deg  QRSD Interval= 97  ms  QT Interval= 345  ms  QRS Axis= 45  deg  T Wave Axis= 25  deg  - OTHERWISE NORMAL ECG -  Sinus tachycardia  Ventricular premature complex  No change from prior tracing  Electronically Signed By: Rochelle Damico ([facility]) 09-Oct-2020 15:33:51  Date and Time of Study: 2020-10-08 15:04:36          Medication Review:   I have reviewed the patient's current medication list    Current Facility-Administered Medications:   •  acetaminophen (TYLENOL) tablet 650 mg, 650 mg, Oral, Q4H PRN **OR** acetaminophen (TYLENOL) 160 MG/5ML solution 650 mg, 650 mg, Oral, Q4H PRN **OR** acetaminophen (TYLENOL) suppository 650 mg, 650 mg, Rectal, Q4H PRN, Adalberto Solo MD  •  apixaban (ELIQUIS) tablet 2.5 mg, 2.5 mg, Oral, Q12H, Adalberto Solo MD  •  bisacodyl (DULCOLAX) EC tablet 5 mg, 5 mg, Oral, Daily PRN, Adalberto Solo MD  •  bisacodyl (DULCOLAX) suppository 10 mg, 10 mg, Rectal, Daily PRN, Adalberto Solo MD  •  chlordiazePOXIDE (LIBRIUM) capsule 10 mg, 10 mg, Oral, Q6H, Adalberto Solo MD, 10 mg at 10/10/20 0500  •   famotidine (PEPCID) tablet 40 mg, 40 mg, Oral, Daily, Adalberto Solo MD, 40 mg at 10/08/20 2051  •  folic acid (FOLVITE) tablet 1 mg, 1 mg, Oral, Daily, Adalberto Solo MD  •  HYDROcodone-acetaminophen (NORCO) 5-325 MG per tablet 1 tablet, 1 tablet, Oral, Q4H PRN, Adalberto Solo MD, 1 tablet at 10/10/20 0500  •  lactated ringers infusion, 100 mL/hr, Intravenous, Continuous, Adalberto Solo MD, Last Rate: 100 mL/hr at 10/10/20 0318, 100 mL/hr at 10/10/20 0318  •  LORazepam (ATIVAN) tablet 1 mg, 1 mg, Oral, Q2H PRN, 1 mg at 10/10/20 0500 **OR** LORazepam (ATIVAN) injection 1 mg, 1 mg, Intravenous, Q2H PRN **OR** LORazepam (ATIVAN) tablet 2 mg, 2 mg, Oral, Q1H PRN **OR** LORazepam (ATIVAN) injection 2 mg, 2 mg, Intravenous, Q1H PRN **OR** LORazepam (ATIVAN) injection 2 mg, 2 mg, Intravenous, Q15 Min PRN **OR** LORazepam (ATIVAN) injection 2 mg, 2 mg, Intramuscular, Q15 Min PRN, Adalberto Solo MD  •  magnesium hydroxide (MILK OF MAGNESIA) suspension 2400 mg/10mL 10 mL, 10 mL, Oral, Daily PRN, Adalberto Solo MD  •  Magnesium Oxide tablet 400 mg, 400 mg, Oral, Daily, Adalberto Solo MD  •  melatonin tablet 5 mg, 5 mg, Oral, Nightly PRN, Adalberto Solo MD  •  morphine injection 4 mg, 4 mg, Intravenous, Q4H PRN, 4 mg at 10/09/20 0916 **AND** naloxone (NARCAN) injection 0.4 mg, 0.4 mg, Intravenous, Q5 Min PRN, Adalberto Solo MD  •  multivitamin with minerals 1 tablet, 1 tablet, Oral, Daily, Adalberto Solo MD  •  nitroglycerin (NITROSTAT) SL tablet 0.4 mg, 0.4 mg, Sublingual, Q5 Min PRN, Adalberto Solo MD  •  ondansetron (ZOFRAN) tablet 4 mg, 4 mg, Oral, Q6H PRN **OR** ondansetron (ZOFRAN) injection 4 mg, 4 mg, Intravenous, Q6H PRN, Adalberto Solo MD  •  ondansetron (ZOFRAN) tablet 4 mg, 4 mg, Oral, Q6H PRN **OR** ondansetron (ZOFRAN) injection 4 mg, 4 mg, Intravenous, Q6H PRN, Adalberto Solo MD  •  oxyCODONE-acetaminophen (PERCOCET) 7.5-325 MG per tablet 1 tablet,  1 tablet, Oral, Q4H PRN, Adalberto Solo MD  •  oxyCODONE-acetaminophen (PERCOCET) 7.5-325 MG per tablet 2 tablet, 2 tablet, Oral, Q4H PRN, Adalberto Solo MD  •  [COMPLETED] Insert peripheral IV, , , Once **AND** sodium chloride 0.9 % flush 10 mL, 10 mL, Intravenous, PRN, Adalberto Solo MD  •  sodium chloride 0.9 % flush 10 mL, 10 mL, Intravenous, PRN, Adalberto Solo MD  •  sodium chloride 0.9 % flush 10 mL, 10 mL, Intravenous, Q12H, Adalberto Solo MD, 10 mL at 10/09/20 2113  •  sodium chloride 0.9 % infusion  - ADS Override Pull, , , ,   •  sodium chloride 0.9 % infusion 40 mL, 40 mL, Intravenous, PRN, Adalberto Solo MD  •  sodium chloride 0.9 % infusion, 75 mL/hr, Intravenous, Continuous, Adalberto Solo MD, Last Rate: 75 mL/hr at 10/09/20 0748, 75 mL/hr at 10/09/20 0748  •  vancomycin (VANCOCIN) 1 g injection  - ADS Override Pull, , , ,   •  vancomycin (VANCOCIN) capsule 125 mg, 125 mg, Oral, 4x Daily, Adalberto Solo MD, 125 mg at 10/09/20 2113  •  vancomycin 750 MG injection  - ADS Override Pull, , , ,       Assessment/Plan           PLAN:    -Labs and diagnostic tests reviewed: YES    -Diagnostic tests reviewed: YES    -Consultations: Dr Solo (Ortho)    -Any new recommendations: As per Dr Sloo    -New Labs ordered: CBC and CMP    -New diagnostic tests ordered: N/A    -Any changes in medications:  MEDICATION CHANGES:   New Medications added:As per order sheet   Medication Dose changed:N/A   Medications deleted:N/A    -Placement issues: N/A    -Patient is clinically and hemodynamically stable    -To continue current management and supportive care    -Will follow patient closely    -Nothing new to add for right now    -Discharge planning issues: Patient should be able to go back home once ready for discharge      DIAGNOSES:      PRIMARY DIAGNOSES:      Acute comminuted intertrochanteric fracture right:   Right hip intramedullary nail for treatment of intertrochanteric  fracture by Dr Solo yesterday  POD #1  Doing well  Control pain with morphine and hydrocodone as needed  Management as per Ortho     Chronic pancytopenia/macrocytic anemia with coagulopathy:  Recent melena/ABLA/GIB  INR 1.42, hemoglobin 9.7, platelets 114,000 no active blood loss noted  Monitor     Alcohol dependence with acute alcohol withdrawal, recent seizure:  CIWA protocol initiated, start Librium scheduled  Rally bag in process  Seizure/fall precautions/telemetry/pulse ox  During recent admission was offered inpatient alcohol admission and declined  Currently denies alcohol dependence  Monitor closely    Coarse tremors: Most likely associated with anxiety and alcohol withdrawl     C diff Infection:  Continue oral vancomycin sliding to manage 10/14/2020     Fatty liver disease:   Noted during recent admission    GERD:   On Inj Famotidine. NO acute issues    Depression:   Hx noted. No acute issues    DJD:   Hx noted. No acute issues    Hx of Hypomagnesemia:   On replacement    Hypocalcemia: Serum calcium 7.9 with albumin 2.50. Corrected serum calcium 8.7, which is normal     Dysphagia:   Noted during this admission with mechanical soft/thins recommended     Anthropometric Analysis: BMI:  33.91 (Obesity Class I)    CODE Status: FULL CODE    Tobacco use: N/A    Alcohol intake: YES. But says he does not drink daily    Illegal Drug use: N/A    DVT Prophylaxis: On Apixaban and SCDs      SECONDARY DIAGNOSES:  ?     As above      SURGICAL DIAGNOSES:      As per Problem List      Plan for disposition: Patient should be able to go back to Home/NH once ready for discharge    Adam Rivera MD  10/10/20  06:59 EDT            Adam Rivera M.D.; MS; FACP; MPH; [facility]  Internal Medicine/ Hospitalist          Time: 45 minutes

## 2020-10-10 NOTE — PLAN OF CARE
Problem: Adult Inpatient Plan of Care  Goal: Plan of Care Review  Recent Flowsheet Documentation  Taken 10/10/2020 0926 by Rosalba Voss OTR  Plan of Care Reviewed With: patient  Outcome Summary: OT evaluation completed. Patient required mod A X 2 for supine to sit (extended time required). Patient stood from elevated EOB with mod/max A x 2 and took 3-4 steps to the chair with min assist X 2 and rolling walker. Patient required mod/max assist for LB dressing (significant tremors thoughout body). Patient reported no pain thoughout evaluation. OT to follow while in hosptial to address ADLs/safety during transfers/mobility. Patient would benefit from short term rehab prior to return home. Patient agreeable.

## 2020-10-10 NOTE — THERAPY EVALUATION
Acute Care - Physical Therapy Initial Evaluation   Noris Ballesteros     Patient Name: Simone Boykin  : 1970  MRN: [id number]  Today's Date: 10/10/2020   Onset of Illness/Injury or Date of Surgery: 10/08/20       PT Assessment (last 12 hours)      PT Evaluation and Treatment     Row Name 10/10/20 0927          Physical Therapy Time and Intention    Subjective Information  no complaints  -LN     Document Type  evaluation  -LN     Mode of Treatment  physical therapy  -LN     Patient Effort  good  -LN     Row Name 10/10/20 0927          General Information    Patient Profile Reviewed  yes  -LN     Onset of Illness/Injury or Date of Surgery  10/08/20  -LN     Referring Physician  Dr. Solo  -LN     Patient/Family/Caregiver Comments/Observations  Patient reclined in bed, agreed to evaluation Patient with bandage right hip; telemetry, IV, Bilateral thromboguards.   -LN     Prior Level of Function  independent:;all household mobility;community mobility;gait;transfer;bed mobility  -LN     Equipment Currently Used at Home  walker, rolling  -LN     Pertinent History of Current Functional Problem  Patient seen at [facility] on  after a fall (tripped over his dog) and diagnosed with concussion and seizures and was discharged on 10/6/2020.  Patient discharged home with a RW and suffered another fall at [facility] on 10/8 resulting in a R hip fracture; x-ray showed a comminuted intertrochanteric right femur fracture. Patient is now s/p R hip intramedullary nailing of intertrochanteric fracture and is partial weight bearing. At baseline patient lives with mother in a 2 level home and reported prior to falls was ind. with mobility & gait and did not use an AD until after recent fall.   -LN     Existing Precautions/Restrictions  fall;weight bearing PWB right leg  -LN     Limitations/Impairments  other (see comments) tremors from alcohol withdrawal  -LN     Risks Reviewed  patient:;LOB;nausea/vomiting;dizziness;increased  discomfort  -LN     Benefits Reviewed  patient:;improve function;increase independence;increase strength;decrease risk of DVT;increase knowledge  -LN     Barriers to Rehab  -- ETOH abuse/withdrawal  -LN     Row Name 10/10/20 0927          Living Environment    Current Living Arrangements  home/apartment/condo  -LN     Home Accessibility  stairs to enter home  -LN     Lives With  parent(s) Mother  -LN     Row Name 10/10/20 0927          Home Main Entrance    Number of Stairs, Main Entrance  five  -LN     Stair Railings, Main Entrance  railings on both sides of stairs  -LN     Stairs Comment, Main Entrance  He does not need to use any stairs once he is in home; can live on first floor.  -LN     Row Name 10/10/20 0927          Home Use of Assistive/Adaptive Equipment    Equipment Currently Used at Home  walker, rolling since recent fall  -LN     Row Name 10/10/20 0927          Cognition    Orientation Status (Cognition)  oriented x 3  -LN     Follows Commands (Cognition)  WFL  -LN     Personal Safety Interventions  gait belt;nonskid shoes/slippers when out of bed;supervised activity  -LN     Row Name 10/10/20 0927          Pain Scale: Numbers Pre/Post-Treatment    Pretreatment Pain Rating  0/10 - no pain  -LN     Posttreatment Pain Rating  0/10 - no pain  -LN     Row Name 10/10/20 0927          Range of Motion Comprehensive    Comment, General Range of Motion  Left LE AROM WFL; right ankle WFL; knee and hip NT (patient noted to have a large bruise/scrape on knee).   -LN     Row Name 10/10/20 0927          Strength Comprehensive (MMT)    Comment, General Manual Muscle Testing (MMT) Assessment  Left LE grossly 4/5; right LE NT.  -LN     Row Name 10/10/20 0927          Mobility    Extremity Weight-bearing Status  right lower extremity  -LN     Right Lower Extremity (Weight-bearing Status)  partial weight-bearing (PWB) right leg  -LN     Row Name 10/10/20 0927          Bed Mobility    Bed Mobility  supine-sit  -LN      Supine-Sit [place] (Bed Mobility)  moderate assist (50% patient effort);2 person assist;verbal cues;nonverbal cues (demo/gesture)  -LN     Bed Mobility, Safety Issues  decreased use of legs for bridging/pushing  -LN     Assistive Device (Bed Mobility)  bed rails;head of bed elevated;draw sheet  -LN     Comment (Bed Mobility)  Extended time to complete; affected by tremors  -LN     Row Name 10/10/20 0927          Transfers    Transfers  sit-stand transfer;stand-sit transfer  -LN     Maintains Weight-bearing Status (Transfers)  verbal cues to maintain  -LN     Comment (Transfers)  Verbal cues for hand placement and cues to maintain PWB  -LN     Sit-Stand [place] (Transfers)  moderate assist (50% patient effort);maximum assist (25% patient effort);2 person assist;verbal cues;nonverbal cues (demo/gesture)  -LN     Stand-Sit [place] (Transfers)  minimum assist (75% patient effort);2 person assist;verbal cues;nonverbal cues (demo/gesture)  -LN     Row Name 10/10/20 0927          Sit-Stand Transfer    Assistive Device (Sit-Stand Transfers)  walker, front-wheeled  -LN     Row Name 10/10/20 0927          Stand-Sit Transfer    Assistive Device (Stand-Sit Transfers)  walker, front-wheeled  -LN     Row Name 10/10/20 0927          Gait/Stairs (Locomotion)    [place] Level (Gait)  minimum assist (75% patient effort);2 person assist;verbal cues;nonverbal cues (demo/gesture) PWB right leg  -LN     Assistive Device (Gait)  walker, front-wheeled  -LN     Distance in Feet (Gait)  4-5 feet from bed to recliner chair  -LN     Deviations/Abnormal Patterns (Gait)  right sided deviations;antalgic;gait speed decreased;stride length decreased  -LN     Right Sided Gait Deviations  heel strike decreased;forward flexed posture Bilateral UE tremors/tremors throughout body noted.   -LN     Comment (Gait/Stairs)  Patient with significant tremors noted from alcohol withdrawal which affects his mobility and gait and limited his  distance ambulated today and decreases his safety with mobility.  -LN     Row Name 10/10/20 0927          Safety Issues, Functional Mobility    Safety Issues Affecting Function (Mobility)  positioning of assistive device  -LN     Impairments Affecting Function (Mobility)  balance;endurance/activity tolerance;range of motion (ROM);strength  -LN     Comment, Safety Issues/Impairments (Mobility)  significant tremors from alcohol withdrawal that definitely affects his safety with mobilty and gait.  -LN                            Row Name 10/10/20 0927          Plan of Care Review    Plan of Care Reviewed With  patient  -LN     Outcome Summary  PT evaluation completed this am. Patient needed Mod A of 2 for supine to sit transfers as well as extended time to complete. He transferred sit to stand with mod/max A of 2 from elevated bed surface with FWW and cueing for hand placement and cueing for PWB right leg.  He took about 4-5 steps from bed to recliner chair with minimal assist of 2 and cueing. Cueing needed for proper distance from walker. Patient with significant tremors throughout body that affects his mobility and gait and safety with mobility.  Patient with no reports of any pain throughout evaluation. Patient will benefit from skilled PT while in hospital for functional mobility and gait training with FWW, PWB right as well as right LE exercises as tolerated with HEP and patient education. Patient would benefit from a STR stay prior to going home to increase his independence and safety. Patient agreeable to this.  -LN     Row Name 10/10/20 0927          Bed Mobility Goal 1 (PT)    Activity/Assistive Device (Bed Mobility Goal 1, PT)  sit to supine;supine to sit  -LN     [place] Level/Cues Needed (Bed Mobility Goal 1, PT)  minimum assist (75% or more patient effort)  -LN     Time Frame (Bed Mobility Goal 1, PT)  5 days  -LN     Progress/Outcomes (Bed Mobility Goal 1, PT)  goal ongoing  -LN     Row Name 10/10/20  0927          Transfer Goal 1 (PT)    Activity/Assistive Device (Transfer Goal 1, PT)  sit-to-stand/stand-to-sit;walker, rolling  -LN     [place] Level/Cues Needed (Transfer Goal 1, PT)  minimum assist (75% or more patient effort);1 person assist PWB right leg  -LN     Time Frame (Transfer Goal 1, PT)  5 days  -LN     Progress/Outcome (Transfer Goal 1, PT)  goal ongoing  -LN     Row Name 10/10/20 0927          Gait Training Goal 1 (PT)    Activity/Assistive Device (Gait Training Goal 1, PT)  gait (walking locomotion);assistive device use;walker, rolling  -LN     [place] Level (Gait Training Goal 1, PT)  contact guard assist;1 person assist  -LN     Distance (Gait Training Goal 1, PT)  50 feet PWB right leg  -LN     Time Frame (Gait Training Goal 1, PT)  5 days  -LN     Progress/Outcome (Gait Training Goal 1, PT)  goal ongoing  -LN     Row Name 10/10/20 0927          Stairs Goal 1 (PT)    Activity/Assistive Device (Stairs Goal 1, PT)  stairs, all skills;ascending stairs;descending stairs;other (see comments) Bilateral handrails  -LN     [place] Level/Cues Needed (Stairs Goal 1, PT)  minimum assist (75% or more patient effort) PWB right leg  -LN     Number of Stairs (Stairs Goal 1, PT)  5  -LN     Time Frame (Stairs Goal 1, PT)  5 days  -LN     Progress/Outcome (Stairs Goal 1, PT)  goal ongoing  -LN     Row Name 10/10/20 0927          Patient Education Goal (PT)    Activity (Patient Education Goal, PT)  Right LE HEP  -LN     [place]/Cues/Accuracy (Memory Goal 2, PT)  demonstrates adequately;independent;verbalizes understanding  -LN     Time Frame (Patient Education Goal, PT)  5 days  -LN     Progress/Outcome (Patient Education Goal, PT)  goal ongoing  -LN     Row Name 10/10/20 0927          Positioning and Restraints    Pre-Treatment Position  in bed  -LN     Post Treatment Position  chair  -LN     In Chair  notified nsg;reclined;call light within reach;encouraged to call for assist;legs elevated   -LN     Row Name 10/10/20 0927          Therapy Assessment/Plan (PT)    PT Diagnosis (PT)  s/p right hip intramedullary nailing secondary to hip fracture with decreased mobility and gait; also with alcohol withdrawal tremors.  -LN     Rehab Potential (PT)  good, to achieve stated therapy goals  -LN     Criteria for Skilled Interventions Met (PT)  yes;skilled treatment is necessary  -LN     Predicted Duration of Therapy Intervention (PT)  while in hospital; 3-5 days  -LN     Row Name 10/10/20 0927          PT Evaluation Complexity    History, PT Evaluation Complexity  1-2 personal factors and/or comorbidities  -LN     Examination of Body Systems (PT Eval Complexity)  1-2 elements  -LN     Clinical Presentation (PT Evaluation Complexity)  evolving  -LN     Clinical Decision Making (PT Evaluation Complexity)  low complexity  -LN     Overall Complexity (PT Evaluation Complexity)  low complexity  -LN       User Key  (r) = Recorded By, (t) = Taken By, (c) = Cosigned By    Initials Name Provider Type    LN Nanette Rose, PT Physical Therapist              Physical Therapy Education                 Title: PT OT SLP Therapies (In Progress)     Topic: Physical Therapy (In Progress)     Point: Mobility training (Done)     Learning Progress Summary           Patient Acceptance, E, VU by LN at 10/10/2020 1256    Comment: Education on functional mobility and gait with FWW, PWB right leg.                   Point: Home exercise program (Not Started)     Learner Progress:  Not documented in this visit.          Point: Body mechanics (Done)     Learning Progress Summary           Patient Acceptance, E, VU by LN at 10/10/2020 1256    Comment: Education on functional mobility and gait with FWW, PWB right leg.                   Point: Precautions (Done)     Learning Progress Summary           Patient Acceptance, E, VU by LN at 10/10/2020 1256    Comment: Education on functional mobility and gait with FWW, PWB right leg.                                User Key     Initials Effective Dates Name Provider Type Discipline    LN 08/09/20 -  Nanette Rose, PT Physical Therapist PT              PT Recommendation and Plan  Planned Therapy Interventions (PT): bed mobility training, gait training, home exercise program, patient/family education, ROM (range of motion), stair training, strengthening  Therapy Frequency (PT): other (see comments)(2 x day; daily on Sat and Sunday)  Plan of Care Reviewed With: patient  Outcome Summary: PT evaluation completed this am. Patient needed Mod A of 2 for supine to sit transfers as well as extended time to complete. He transferred sit to stand with mod/max A of 2 from elevated bed surface with FWW and cueing for hand placement and cueing for PWB right leg.  He took about 4-5 steps from bed to recliner chair with minimal assist of 2 and cueing. Cueing needed for proper distance from walker. Patient with significant tremors throughout body that affects his mobility and gait and safety with mobility.  Patient with no reports of any pain throughout evaluation. Patient will benefitf from skilled PT while in hospital for functional mobility and gait training with FWW, PWB right as well as right LE exercises as tolerated with HEP and patient education. Patient would benefit from a STR stay prior to going home to increase his independence and safety.  Patient agreeable to this.     Outcome Measures     Row Name 10/10/20 1000 10/10/20 0927          How much help from another person do you currently need...    Turning from your back to your side while in flat bed without using bedrails?  --  2  -LN     Moving from lying on back to sitting on the side of a flat bed without bedrails?  --  2  -LN     Moving to and from a bed to a chair (including a wheelchair)?  --  2  -LN     Standing up from a chair using your arms (e.g., wheelchair, bedside chair)?  --  2  -LN     Climbing 3-5 steps with a railing?  --  1  -LN      To walk in hospital room?  --  3  -LN     AM-PAC 6 Clicks Score (PT)  --  12  -LN        How much help from another is currently needed...    Putting on and taking off regular lower body clothing?  2  -EN  --     Bathing (including washing, rinsing, and drying)  2  -EN  --     Toileting (which includes using toilet bed pan or urinal)  2  -EN  --     Putting on and taking off regular upper body clothing  4  -EN  --     Taking care of personal grooming (such as brushing teeth)  4  -EN  --     Eating meals  4  -EN  --     AM-PAC 6 Clicks Score (OT)  18  -EN  --        Functional Assessment    Outcome Measure Options  AM-PAC 6 Clicks Daily Activity (OT)  -EN  AM-PAC 6 Clicks Basic Mobility (PT)  -LN       User Key  (r) = Recorded By, (t) = Taken By, (c) = Cosigned By    Initials Name Provider Type    EN Rosalba Voss, OTR Occupational Therapist    Nanette Hughes, PT Physical Therapist           Time Calculation:   PT Charges     Row Name 10/10/20 1259             Time Calculation    Start Time  0927  -LN      Stop Time  1004  -LN      Time Calculation (min)  37 min  -LN      PT Received On  10/10/20  -LN      PT - Next Appointment  10/11/20  -LN        User Key  (r) = Recorded By, (t) = Taken By, (c) = Cosigned By    Initials Name Provider Type    Nanette Hughes, PT Physical Therapist        Therapy Charges for Today     Code Description Service Date Service Provider Modifiers Qty    96882134458 HC PT EVAL LOW COMPLEXITY 2 10/10/2020 Nanette Rose, PT GP 1    99809168650 HC PT THER PROC EA 15 MIN 10/10/2020 Nanette Rose, PT GP 1          PT G-Codes  Outcome Measure Options: AM-PAC 6 Clicks Daily Activity (OT)  AM-PAC 6 Clicks Score (PT): 12  AM-PAC 6 Clicks Score (OT): 18    Nanette Rose PT  10/10/2020

## 2020-10-11 LAB
ALBUMIN SERPL-MCNC: 2.6 G/DL (ref 3.5–5.2)
ALBUMIN/GLOB SERPL: 1 G/DL
ALP SERPL-CCNC: 52 U/L (ref 39–117)
ALT SERPL W P-5'-P-CCNC: 15 U/L (ref 1–41)
ANION GAP SERPL CALCULATED.3IONS-SCNC: 8.8 MMOL/L (ref 5–15)
AST SERPL-CCNC: 26 U/L (ref 1–40)
BASOPHILS # BLD AUTO: 0.01 10*3/MM3 (ref 0–0.2)
BASOPHILS NFR BLD AUTO: 0.1 % (ref 0–1.5)
BILIRUB SERPL-MCNC: 0.7 MG/DL (ref 0–1.2)
BUN SERPL-MCNC: 11 MG/DL (ref 6–20)
BUN/CREAT SERPL: 13.4 (ref 7–25)
CALCIUM SPEC-SCNC: 8 MG/DL (ref 8.6–10.5)
CHLORIDE SERPL-SCNC: 106 MMOL/L (ref 98–107)
CO2 SERPL-SCNC: 22.2 MMOL/L (ref 22–29)
CREAT SERPL-MCNC: 0.82 MG/DL (ref 0.76–1.27)
DEPRECATED RDW RBC AUTO: 54.1 FL (ref 37–54)
EOSINOPHIL # BLD AUTO: 0.03 10*3/MM3 (ref 0–0.4)
EOSINOPHIL NFR BLD AUTO: 0.3 % (ref 0.3–6.2)
ERYTHROCYTE [DISTWIDTH] IN BLOOD BY AUTOMATED COUNT: 14.9 % (ref 12.3–15.4)
GFR SERPL CREATININE-BSD FRML MDRD: 99 ML/MIN/1.73
GLOBULIN UR ELPH-MCNC: 2.7 GM/DL
GLUCOSE SERPL-MCNC: 137 MG/DL (ref 65–99)
HCT VFR BLD AUTO: 20.9 % (ref 37.5–51)
HGB BLD-MCNC: 6.5 G/DL (ref 13–17.7)
IMM GRANULOCYTES # BLD AUTO: 0.04 10*3/MM3 (ref 0–0.05)
IMM GRANULOCYTES NFR BLD AUTO: 0.4 % (ref 0–0.5)
LYMPHOCYTES # BLD AUTO: 0.9 10*3/MM3 (ref 0.7–3.1)
LYMPHOCYTES NFR BLD AUTO: 10 % (ref 19.6–45.3)
MAGNESIUM SERPL-MCNC: 1.8 MG/DL (ref 1.6–2.6)
MCH RBC QN AUTO: 31.6 PG (ref 26.6–33)
MCHC RBC AUTO-ENTMCNC: 31.1 G/DL (ref 31.5–35.7)
MCV RBC AUTO: 101.5 FL (ref 79–97)
MONOCYTES # BLD AUTO: 1.01 10*3/MM3 (ref 0.1–0.9)
MONOCYTES NFR BLD AUTO: 11.2 % (ref 5–12)
NEUTROPHILS NFR BLD AUTO: 7.01 10*3/MM3 (ref 1.7–7)
NEUTROPHILS NFR BLD AUTO: 78 % (ref 42.7–76)
NRBC BLD AUTO-RTO: 0 /100 WBC (ref 0–0.2)
PLATELET # BLD AUTO: 145 10*3/MM3 (ref 140–450)
PMV BLD AUTO: 10.2 FL (ref 6–12)
POTASSIUM SERPL-SCNC: 4.1 MMOL/L (ref 3.5–5.2)
PROT SERPL-MCNC: 5.3 G/DL (ref 6–8.5)
RBC # BLD AUTO: 2.06 10*6/MM3 (ref 4.14–5.8)
SODIUM SERPL-SCNC: 137 MMOL/L (ref 136–145)
WBC # BLD AUTO: 9 10*3/MM3 (ref 3.4–10.8)

## 2020-10-11 PROCEDURE — 85025 COMPLETE CBC W/AUTO DIFF WBC: CPT | Performed by: INTERNAL MEDICINE

## 2020-10-11 PROCEDURE — 99222 1ST HOSP IP/OBS MODERATE 55: CPT | Performed by: INTERNAL MEDICINE

## 2020-10-11 PROCEDURE — 83735 ASSAY OF MAGNESIUM: CPT | Performed by: INTERNAL MEDICINE

## 2020-10-11 PROCEDURE — 80053 COMPREHEN METABOLIC PANEL: CPT | Performed by: INTERNAL MEDICINE

## 2020-10-11 PROCEDURE — 86900 BLOOD TYPING SEROLOGIC ABO: CPT

## 2020-10-11 PROCEDURE — P9016 RBC LEUKOCYTES REDUCED: HCPCS

## 2020-10-11 PROCEDURE — 36430 TRANSFUSION BLD/BLD COMPNT: CPT

## 2020-10-11 PROCEDURE — 99232 SBSQ HOSP IP/OBS MODERATE 35: CPT | Performed by: INTERNAL MEDICINE

## 2020-10-11 RX ORDER — SODIUM CHLORIDE 9 MG/ML
INJECTION, SOLUTION INTRAVENOUS
Status: COMPLETED
Start: 2020-10-11 | End: 2020-10-11

## 2020-10-11 RX ORDER — FAMOTIDINE 10 MG/ML
20 INJECTION, SOLUTION INTRAVENOUS EVERY 12 HOURS SCHEDULED
Status: DISCONTINUED | OUTPATIENT
Start: 2020-10-11 | End: 2020-10-13

## 2020-10-11 RX ADMIN — FOLIC ACID 1 MG: 1 TABLET ORAL at 08:37

## 2020-10-11 RX ADMIN — FAMOTIDINE 20 MG: 10 INJECTION INTRAVENOUS at 08:36

## 2020-10-11 RX ADMIN — CHLORDIAZEPOXIDE HYDROCHLORIDE 10 MG: 5 CAPSULE ORAL at 11:57

## 2020-10-11 RX ADMIN — CHLORDIAZEPOXIDE HYDROCHLORIDE 10 MG: 5 CAPSULE ORAL at 17:40

## 2020-10-11 RX ADMIN — VANCOMYCIN HYDROCHLORIDE 125 MG: 125 CAPSULE ORAL at 17:40

## 2020-10-11 RX ADMIN — CHLORDIAZEPOXIDE HYDROCHLORIDE 10 MG: 5 CAPSULE ORAL at 06:22

## 2020-10-11 RX ADMIN — VANCOMYCIN HYDROCHLORIDE 125 MG: 125 CAPSULE ORAL at 21:20

## 2020-10-11 RX ADMIN — SODIUM CHLORIDE: 9 INJECTION, SOLUTION INTRAVENOUS at 12:30

## 2020-10-11 RX ADMIN — OXYCODONE HYDROCHLORIDE AND ACETAMINOPHEN 1 TABLET: 7.5; 325 TABLET ORAL at 15:08

## 2020-10-11 RX ADMIN — Medication 400 MG: at 08:37

## 2020-10-11 RX ADMIN — SODIUM CHLORIDE, PRESERVATIVE FREE 10 ML: 5 INJECTION INTRAVENOUS at 08:37

## 2020-10-11 RX ADMIN — SODIUM CHLORIDE, PRESERVATIVE FREE 10 ML: 5 INJECTION INTRAVENOUS at 20:41

## 2020-10-11 RX ADMIN — CHLORDIAZEPOXIDE HYDROCHLORIDE 10 MG: 5 CAPSULE ORAL at 00:40

## 2020-10-11 RX ADMIN — VANCOMYCIN HYDROCHLORIDE 125 MG: 125 CAPSULE ORAL at 08:36

## 2020-10-11 RX ADMIN — OXYCODONE HYDROCHLORIDE AND ACETAMINOPHEN 2 TABLET: 7.5; 325 TABLET ORAL at 00:39

## 2020-10-11 RX ADMIN — VANCOMYCIN HYDROCHLORIDE 125 MG: 125 CAPSULE ORAL at 11:57

## 2020-10-11 RX ADMIN — CHLORDIAZEPOXIDE HYDROCHLORIDE 10 MG: 5 CAPSULE ORAL at 23:24

## 2020-10-11 RX ADMIN — SODIUM CHLORIDE: 9 INJECTION, SOLUTION INTRAVENOUS at 15:11

## 2020-10-11 RX ADMIN — FAMOTIDINE 20 MG: 10 INJECTION INTRAVENOUS at 21:20

## 2020-10-11 RX ADMIN — OXYCODONE HYDROCHLORIDE AND ACETAMINOPHEN 2 TABLET: 7.5; 325 TABLET ORAL at 21:25

## 2020-10-11 RX ADMIN — MULTIPLE VITAMINS W/ MINERALS TAB 1 TABLET: TAB at 08:37

## 2020-10-11 NOTE — NURSING NOTE
Notified Dr. Aquino that patient is refusing blood transfusion at this time, would like to first speak the doctors today then decide.

## 2020-10-11 NOTE — PROGRESS NOTES
POD# 2 s/p right hip nail    Subjective: Patient states doing fairly well though pain slightly increased today.  He was unable to work with physical therapy today due to receiving 2 units of packed red blood cells with hematocrit at 20.9. Denies residual numbness or tingling to right lower extremity.  Denies any fevers chills or sweats overnight.    Objective:  Vitals:    10/11/20 1133 10/11/20 1315 10/11/20 1421 10/11/20 1457   BP: 109/65 130/78 107/70 107/69   BP Location: Left arm  Left arm    Patient Position: Lying      Pulse: 95 99 93 91   Resp: 18 18 18 18   Temp: 98.2 °F (36.8 °C) 98.1 °F (36.7 °C) 98.3 °F (36.8 °C) 98.7 °F (37.1 °C)   TempSrc: Oral Oral Oral Oral   SpO2: 95% 98% 98% 97%   Weight:       Height:           Results from last 7 days   Lab Units 10/11/20  0424 10/10/20  0538 10/09/20  2028 10/09/20  0958 10/08/20  1458   WBC 10*3/mm3 9.00  --   --  5.17 5.25   HEMOGLOBIN g/dL 6.5* 7.9* 8.1* 8.4* 8.7*   HEMATOCRIT % 20.9* 24.2* 24.9* 26.4* 27.5*   PLATELETS 10*3/mm3 145  --   --  118* 114*       Results from last 7 days   Lab Units 10/11/20  0424 10/10/20  0538 10/09/20  0958   SODIUM mmol/L 137 134* 134*   POTASSIUM mmol/L 4.1 5.0 3.7   CHLORIDE mmol/L 106 105 104   CO2 mmol/L 22.2 17.1* 21.2*   BUN mg/dL 11 10 10   CREATININE mg/dL 0.82 0.83 0.76   GLUCOSE mg/dL 137* 195* 91   CALCIUM mg/dL 8.0* 7.9* 7.9*       Exam:    Right lower extremity   Dressing clean dry and intact over lateral hip   Dorsiflex and plantarflex ankle and flex and extend toes    Positive sensation light touch all distributions   No calf pain, negative Homans sign    Impression: s/p right hip nail    Plan:  1. PT/OT-ambulate, partial weight-bearing right lower extremity  2. Pain control-Percocet   3. DVT prophylaxis-Eliquis  4. Wound care-leave dressing in place until follow-up in 2 weeks  5. Disposition- home with home health therapy versus rehab-likely will need rehab at time of discharge.  Disposition per hospitalist  6.   Receiving 2 units packed red blood cells per hospitalist

## 2020-10-11 NOTE — PLAN OF CARE
Problem: Adult Inpatient Plan of Care  Goal: Plan of Care Review  Outcome: Ongoing, Progressing     Problem: Adult Inpatient Plan of Care  Goal: Patient-Specific Goal (Individualized)  Outcome: Ongoing, Progressing     Problem: Adult Inpatient Plan of Care  Goal: Absence of Hospital-Acquired Illness or Injury  Outcome: Ongoing, Progressing     Problem: Adult Inpatient Plan of Care  Goal: Absence of Hospital-Acquired Illness or Injury  Intervention: Identify and Manage Fall Risk  Recent Flowsheet Documentation  Taken 10/11/2020 1400 by Gurvinder Dunne RN  Safety Promotion/Fall Prevention: safety round/check completed  Taken 10/11/2020 1200 by Gurvinder Dunne RN  Safety Promotion/Fall Prevention: safety round/check completed  Taken 10/11/2020 1000 by Gurvinder Dunne RN  Safety Promotion/Fall Prevention: safety round/check completed  Taken 10/11/2020 0800 by Gurvinder Dunne RN  Safety Promotion/Fall Prevention: safety round/check completed  Taken 10/11/2020 0705 by Gurvinder Dunne RN  Safety Promotion/Fall Prevention: safety round/check completed     Problem: Adult Inpatient Plan of Care  Goal: Optimal Comfort and Wellbeing  Outcome: Ongoing, Progressing     Problem: Adult Inpatient Plan of Care  Goal: Optimal Comfort and Wellbeing  Intervention: Provide Person-Centered Care  Recent Flowsheet Documentation  Taken 10/11/2020 0800 by Gurvinder Dunne RN  Trust Relationship/Rapport:   care explained   choices provided   emotional support provided   empathic listening provided   questions answered   questions encouraged   reassurance provided   thoughts/feelings acknowledged     Problem: Adult Inpatient Plan of Care  Goal: Readiness for Transition of Care  Outcome: Ongoing, Progressing     Problem: Fall Injury Risk  Goal: Absence of Fall and Fall-Related Injury  Outcome: Ongoing, Progressing     Problem: Fall Injury Risk  Goal: Absence of Fall and Fall-Related Injury  Intervention: Promote Injury-Free Environment  Recent  Flowsheet Documentation  Taken 10/11/2020 1400 by Gurvinder Dunne RN  Safety Promotion/Fall Prevention: safety round/check completed  Taken 10/11/2020 1200 by Gurvinder Dunne RN  Safety Promotion/Fall Prevention: safety round/check completed  Taken 10/11/2020 1000 by Gurvinder Dunne RN  Safety Promotion/Fall Prevention: safety round/check completed  Taken 10/11/2020 0800 by Gurvinder Dunne RN  Safety Promotion/Fall Prevention: safety round/check completed  Taken 10/11/2020 0705 by Gurvinder Dunne RN  Safety Promotion/Fall Prevention: safety round/check completed     Problem: Skin Injury Risk Increased  Goal: Skin Health and Integrity  Outcome: Ongoing, Progressing   Goal Outcome Evaluation:  Plan of Care Reviewed With: patient  Progress: no change

## 2020-10-11 NOTE — PROGRESS NOTES
Patient: Simone Boykin  Procedure(s):  HIP INTERTROCHANTERIC NAILING  Anesthesia type: general    Patient location: [facility]  Vitals:    10/10/20 1430 10/10/20 1529 10/10/20 2018 10/11/20 0650   BP:  118/76 115/72 123/82   BP Location:  Right arm Right arm Left arm   Patient Position:  Sitting Lying Lying   Pulse:  94 104 93   Resp:  18 20 18   Temp:  97.4 °F (36.3 °C) 97.6 °F (36.4 °C) 98.4 °F (36.9 °C)   TempSrc:  Oral Oral Oral   SpO2: 97% 94% 97% 97%   Weight:       Height:         Level of consciousness: awake, alert and oriented    Post-anesthesia pain: adequate analgesia  Airway patency: patent  Respiratory: unassisted  Cardiovascular: stable and blood pressure at baseline  Hydration: euvolemic    Anesthetic complications: no

## 2020-10-11 NOTE — SIGNIFICANT NOTE
Pt unavailable for PT today. Pt with HgB at 6.5 and receiving 2 units of blood.   10/11/20 1056   OTHER   Discipline physical therapy assistant   Rehab Time/Intention   Session Not Performed patient unavailable for treatment   Recommendation   PT - Next Appointment 10/12/20

## 2020-10-11 NOTE — CONSULTS
"Patient Care Team:  Liza Sapp PA-C as PCP - General (Family Medicine)    CHIEF COMPLAINT: Anemia    HISTORY OF PRESENT ILLNESS:  49 yo who had increased his drinking after his father's death ( he performed CPR) and subsequent seizure and concussion proposed as a W/D seizure and was hospitalized in [facility] with DT and melena an EGD after he went through DTs showed esopahgitis only and presumably had a MWT. uupon D/C in lint to get his meds at [facility] he fell and broke his Right hip, he is now Post op and had a normal BVM yeasterday and no further emesis nor bleeding his incision sites look good with \"normal bruising\" His plts have been low suggesting a suppressed BM which might explain his poor recovery HGB -wise from his trauma and surgery.  No previous colonoscopy but at this time anything that might increase his risk of infection is probably not worth the risk. HE is to receive another 2 units today. He also aquired CDC and is on adequate treatment presently.    Past Medical History:   Diagnosis Date   • Anxiety    • Arthritis     JUAN KNEES   • Depression    • Fall    • GERD (gastroesophageal reflux disease)    • Heartburn     ON OCC   • Mass     BASE OF POSTERIOR NECK   • Shoulder dislocation     LONG TERM ISSUES WITH     Past Surgical History:   Procedure Laterality Date   • EXCISION MASS HEAD/NECK N/A 4/20/2016    Procedure: MASS SOFT TISSUE EXCISION POSTERIOR NECK;  Surgeon: Crow Ayala Jr., MD;  Location: [facility];  Service:    • KNEE ARTHROSCOPY Right      Family History   Problem Relation Age of Onset   • Heart disease Father    • Thyroid disease Brother      Social History     Tobacco Use   • Smoking status: Never Smoker   • Smokeless tobacco: Never Used   Substance Use Topics   • Alcohol use: Yes     Comment: states no longer drinks daily   • Drug use: No     Medications Prior to Admission   Medication Sig Dispense Refill Last Dose   • folic acid (FOLVITE) 1 MG tablet Take 1 mg by " "mouth Daily.   10/7/2020   • Rebecca, Zingiber officinalis, (Rebecca Root) 250 MG capsule Take 250 mg by mouth Daily. Patient states he is unsure of dosage. \"whatever they sell at [employer]\"   10/7/2020   • Multiple Vitamins-Minerals (multivitamin and minerals) liquid liquid Take  by mouth Daily.   10/8/2020 at Unknown time   • vancomycin (VANCOCIN) 125 MG capsule Take 125 mg by mouth 4 (Four) Times a Day.   10/9/2020 at Unknown time   • Magnesium Oxide 400 (240 Mg) MG tablet Take 400 mg by mouth Daily.   Unknown at Unknown time     Allergies:  Patient has no known allergies.    REVIEW OF SYSTEMS:  Please see the above history of present illness for pertinent positives and negatives.  The remainder of the patient's systems have been reviewed and are negative.     Vital Signs  Temp:  [97.4 °F (36.3 °C)-99.6 °F (37.6 °C)] 98.4 °F (36.9 °C)  Heart Rate:  [] 93  Resp:  [18-20] 18  BP: (113-123)/(72-82) 123/82    Flowsheet Rows      First Filed Value   Admission Height  182 cm (71.65\") Documented at 10/08/2020 1321   Admission Weight  113 kg (250 lb) Documented at 10/08/2020 1321           Physical Exam:  Physical Exam   Constitutional: Patient appears well-developed and well-nourished and in no acute distress yet treemulous and has some difficulty in fine motor movements  HEENT:   Head: Normocephalic and atraumatic.   Eyes:  Pupils are equal, round, and reactive to light. EOM are intact. Sclerae are anicteric and non-injected.  Mouth and Throat: Patient has moist mucous membranes. Oropharynx is clear of any erythema or exudate.     Neck: Neck supple. No JVD present. No thyromegaly present. No lymphadenopathy present.  Cardiovascular: Regular rate, regular rhythm, S1 normal and S2 normal.  Exam reveals no gallop and no friction rub.  No murmur heard.  Pulmonary/Chest: Lungs are clear to auscultation bilaterally. No respiratory distress. No wheezes. No rhonchi. No rales.   Abdominal: Soft. Bowel sounds are normal. No " distension and no mass. There is no hepatosplenomegaly. There is no tenderness.   Musculoskeletal: Normal Muscle tone  Extremities: No edema. Pulses are palpable in all 4 extremities.2 Right hip incisions are dressed.  Neurological: Patient is alert and oriented to person, place, and time. Cranial nerves II-XII are grossly intact with no focal deficits.  Skin: Skin is warm. No rash noted. Nails show no clubbing.  No cyanosis or erythema.    Debilities/Disabilities Identified: None  Emotional Behavior: Appropriate     Results Review:    I reviewed the patient's new clinical results.  Lab Results (most recent)     Procedure Component Value Units Date/Time    Comprehensive Metabolic Panel [[id number]]  (Abnormal) Collected: 10/11/20 0424    Specimen: Blood Updated: 10/11/20 0512     Glucose 137 mg/dL      BUN 11 mg/dL      Creatinine 0.82 mg/dL      Sodium 137 mmol/L      Potassium 4.1 mmol/L      Chloride 106 mmol/L      CO2 22.2 mmol/L      Calcium 8.0 mg/dL      Total Protein 5.3 g/dL      Albumin 2.60 g/dL      ALT (SGPT) 15 U/L      AST (SGOT) 26 U/L      Alkaline Phosphatase 52 U/L      Total Bilirubin 0.7 mg/dL      eGFR Non African Amer 99 mL/min/1.73      Globulin 2.7 gm/dL      A/G Ratio 1.0 g/dL      BUN/Creatinine Ratio 13.4     Anion Gap 8.8 mmol/L     Narrative:      GFR Normal >60  Chronic Kidney Disease <60  Kidney Failure <15      Magnesium [[id number]]  (Normal) Collected: 10/11/20 0424    Specimen: Blood Updated: 10/11/20 0512     Magnesium 1.8 mg/dL     CBC & Differential [[id number]]  (Abnormal) Collected: 10/11/20 0424    Specimen: Blood Updated: 10/11/20 0500    Narrative:      The following orders were created for panel order CBC & Differential.  Procedure                               Abnormality         Status                     ---------                               -----------         ------                     CBC Auto Differential[[id number]]        Abnormal            Final result                  Please view results for these tests on the individual orders.    CBC Auto Differential [[id number]]  (Abnormal) Collected: 10/11/20 0424    Specimen: Blood Updated: 10/11/20 0500     WBC 9.00 10*3/mm3      RBC 2.06 10*6/mm3      Hemoglobin 6.5 g/dL      Hematocrit 20.9 %      .5 fL      MCH 31.6 pg      MCHC 31.1 g/dL      RDW 14.9 %      RDW-SD 54.1 fl      MPV 10.2 fL      Platelets 145 10*3/mm3      Neutrophil % 78.0 %      Lymphocyte % 10.0 %      Monocyte % 11.2 %      Eosinophil % 0.3 %      Basophil % 0.1 %      Immature Grans % 0.4 %      Neutrophils, Absolute 7.01 10*3/mm3      Lymphocytes, Absolute 0.90 10*3/mm3      Monocytes, Absolute 1.01 10*3/mm3      Eosinophils, Absolute 0.03 10*3/mm3      Basophils, Absolute 0.01 10*3/mm3      Immature Grans, Absolute 0.04 10*3/mm3      nRBC 0.0 /100 WBC     Basic Metabolic Panel [[id number]]  (Abnormal) Collected: 10/10/20 0538    Specimen: Blood Updated: 10/10/20 0700     Glucose 195 mg/dL      BUN 10 mg/dL      Creatinine 0.83 mg/dL      Sodium 134 mmol/L      Potassium 5.0 mmol/L      Comment: Slight hemolysis detected by analyzer. Results may be affected.        Chloride 105 mmol/L      CO2 17.1 mmol/L      Calcium 7.9 mg/dL      eGFR Non African Amer 98 mL/min/1.73      BUN/Creatinine Ratio 12.0     Anion Gap 11.9 mmol/L     Narrative:      GFR Normal >60  Chronic Kidney Disease <60  Kidney Failure <15      Hemoglobin & Hematocrit, Blood [[id number]]  (Abnormal) Collected: 10/10/20 0538    Specimen: Blood Updated: 10/10/20 0634     Hemoglobin 7.9 g/dL      Hematocrit 24.2 %     Hemoglobin & Hematocrit, Blood [[id number]]  (Abnormal) Collected: 10/09/20 2028    Specimen: Blood Updated: 10/09/20 2040     Hemoglobin 8.1 g/dL      Hematocrit 24.9 %     Vitamin B12 [[id number]]  (Abnormal) Collected: 10/09/20 0958    Specimen: Blood Updated: 10/09/20 1706     Vitamin B-12 1,317 pg/mL     Narrative:      Results may be falsely increased if patient taking  Biotin.      Folate [[id number]]  (Normal) Collected: 10/09/20 0958    Specimen: Blood Updated: 10/09/20 1706     Folate 15.90 ng/mL     Narrative:      Results may be falsely increased if patient taking Biotin.      Fibrinogen [[id number]]  (Normal) Collected: 10/09/20 0958    Specimen: Blood Updated: 10/09/20 1043     Fibrinogen 343 mg/dL     Protime-INR [[id number]]  (Abnormal) Collected: 10/09/20 0958    Specimen: Blood Updated: 10/09/20 1035     Protime 17.0 Seconds      INR 1.44    Narrative:      Therapeutic Ranges for INR: 2.0-3.0 (PT 20-30)                              2.5-3.5 (PT 25-34)    Comprehensive Metabolic Panel [[id number]]  (Abnormal) Collected: 10/09/20 0958    Specimen: Blood Updated: 10/09/20 1025     Glucose 91 mg/dL      BUN 10 mg/dL      Creatinine 0.76 mg/dL      Sodium 134 mmol/L      Potassium 3.7 mmol/L      Chloride 104 mmol/L      CO2 21.2 mmol/L      Calcium 7.9 mg/dL      Total Protein 5.4 g/dL      Albumin 2.50 g/dL      ALT (SGPT) 16 U/L      AST (SGOT) 24 U/L      Alkaline Phosphatase 59 U/L      Total Bilirubin 1.1 mg/dL      eGFR Non African Amer 108 mL/min/1.73      Globulin 2.9 gm/dL      A/G Ratio 0.9 g/dL      BUN/Creatinine Ratio 13.2     Anion Gap 8.8 mmol/L     Narrative:      GFR Normal >60  Chronic Kidney Disease <60  Kidney Failure <15      CBC & Differential [[id number]]  (Abnormal) Collected: 10/09/20 0958    Specimen: Blood Updated: 10/09/20 1006    Narrative:      The following orders were created for panel order CBC & Differential.  Procedure                               Abnormality         Status                     ---------                               -----------         ------                     CBC Auto Differential[[id number]]        Abnormal            Final result                 Please view results for these tests on the individual orders.    CBC Auto Differential [[id number]]  (Abnormal) Collected: 10/09/20 0958    Specimen: Blood Updated: 10/09/20 1006      WBC 5.17 10*3/mm3      RBC 2.69 10*6/mm3      Hemoglobin 8.4 g/dL      Hematocrit 26.4 %      MCV 98.1 fL      MCH 31.2 pg      MCHC 31.8 g/dL      RDW 14.6 %      RDW-SD 51.6 fl      MPV 10.0 fL      Platelets 118 10*3/mm3      Neutrophil % 65.1 %      Lymphocyte % 18.6 %      Monocyte % 12.6 %      Eosinophil % 2.5 %      Basophil % 0.2 %      Immature Grans % 1.0 %      Neutrophils, Absolute 3.37 10*3/mm3      Lymphocytes, Absolute 0.96 10*3/mm3      Monocytes, Absolute 0.65 10*3/mm3      Eosinophils, Absolute 0.13 10*3/mm3      Basophils, Absolute 0.01 10*3/mm3      Immature Grans, Absolute 0.05 10*3/mm3      nRBC 0.0 /100 WBC     COVID PRE-OP / PRE-PROCEDURE SCREENING ORDER (NO ISOLATION) - Swab, Nasopharynx [[id number]]  (Normal) Collected: 10/08/20 2103    Specimen: Swab from Nasopharynx Updated: 10/09/20 0054    Narrative:      The following orders were created for panel order COVID PRE-OP / PRE-PROCEDURE SCREENING ORDER (NO ISOLATION) - Swab, Nasopharynx.  Procedure                               Abnormality         Status                     ---------                               -----------         ------                     Respiratory Panel PCR w/...[[id number]]  Normal              Final result                 Please view results for these tests on the individual orders.    Respiratory Panel PCR w/COVID-19(SARS-CoV-2) MICHAEL/ALTAF/KYLE/PAD/COR/MAD In-House, NP Swab in UTM/VTM, 3-4 HR TAT - Swab, Nasopharynx [[id number]]  (Normal) Collected: 10/08/20 2103    Specimen: Swab from Nasopharynx Updated: 10/09/20 0054     ADENOVIRUS, PCR Not Detected     Coronavirus 229E Not Detected     Coronavirus HKU1 Not Detected     Coronavirus NL63 Not Detected     Coronavirus OC43 Not Detected     COVID19 Not Detected     Human Metapneumovirus Not Detected     Human Rhinovirus/Enterovirus Not Detected     Influenza A PCR Not Detected     Influenza A H1 Not Detected     Influenza A H1 2009 PCR Not Detected     Influenza A H3 Not  Detected     Influenza B PCR Not Detected     Parainfluenza Virus 1 Not Detected     Parainfluenza Virus 2 Not Detected     Parainfluenza Virus 3 Not Detected     Parainfluenza Virus 4 Not Detected     RSV, PCR Not Detected     Bordetella pertussis pcr Not Detected     Bordetella parapertussis PCR Not Detected     Chlamydophila pneumoniae PCR Not Detected     Mycoplasma pneumo by PCR Not Detected    Narrative:      Fact sheet for providers: https://[employer]/wp-content/uploads/TZN6002-4695-XJ8.1-EUA-Provider-Fact-Sheet-3.pdf    Fact sheet for patients: https://[employer]/wp-content/uploads/XSI8208-3077-LH3.1-EUA-Patient-Fact-Sheet-1.pdf  Fact sheet for providers: https://[employer]/wp-content/uploads/XVK1887-1448-SZ6.1-EUA-Provider-Fact-Sheet-3.pdf    Fact sheet for patients: https://[employer]/wp-content/uploads/AJL7476-5958-EL0.1-EUA-Patient-Fact-Sheet-1.pdf    Hemoglobin A1c [[id number]]  (Normal) Collected: 10/08/20 1458    Specimen: Blood Updated: 10/08/20 2119     Hemoglobin A1C 4.89 %     Narrative:      Hemoglobin A1C Ranges:    Increased Risk for Diabetes  5.7% to 6.4%  Diabetes                     >= 6.5%  Diabetic Goal                < 7.0%    TSH [[id number]]  (Normal) Collected: 10/08/20 1458    Specimen: Blood Updated: 10/08/20 2049     TSH 0.845 uIU/mL     CK [[id number]]  (Normal) Collected: 10/08/20 1458    Specimen: Blood Updated: 10/08/20 2042     Creatine Kinase 73 U/L     Iron Profile [[id number]]  (Abnormal) Collected: 10/08/20 1458    Specimen: Blood Updated: 10/08/20 2042     Iron 44 mcg/dL      Iron Saturation 22 %      UIBC 160 mcg/dL      TIBC 204 mcg/dL     Ferritin [[id number]]  (Normal) Collected: 10/08/20 1458    Specimen: Blood Updated: 10/08/20 2042     Ferritin 263.00 ng/mL     Narrative:      Results may be falsely decreased if patient taking Biotin.      Ethanol [[id number]] Collected: 10/08/20 1458    Specimen: Blood Updated: 10/08/20 1539     Ethanol  <10 mg/dL      Ethanol % <0.010 %     Protime-INR [[id number]]  (Abnormal) Collected: 10/08/20 1458    Specimen: Blood Updated: 10/08/20 1528     Protime 16.9 Seconds      INR 1.42    Narrative:      Therapeutic Ranges for INR: 2.0-3.0 (PT 20-30)                              2.5-3.5 (PT 25-34)    aPTT [[id number]]  (Normal) Collected: 10/08/20 1458    Specimen: Blood Updated: 10/08/20 1528     PTT 33.3 seconds     Narrative:      PTT = The equivalent PTT values for the therapeutic range of heparin levels at 0.1 to 0.7 U/ml are 53 to 110 seconds.      Urine Drug Screen - Urine, Clean Catch [[id number]]  (Abnormal) Collected: 10/08/20 1445    Specimen: Urine, Clean Catch Updated: 10/08/20 1501     THC, Screen, Urine Negative     Phencyclidine (PCP), Urine Negative     Cocaine Screen, Urine Negative     Methamphetamine, Ur Negative     Opiate Screen Negative     Amphetamine Screen, Urine Negative     Benzodiazepine Screen, Urine Positive     Tricyclic Antidepressants Screen Negative     Methadone Screen, Urine Negative     Barbiturates Screen, Urine Negative     Oxycodone Screen, Urine Negative     Propoxyphene Screen Negative     Buprenorphine, Screen, Urine Negative    Narrative:      Urine drug screen results are to be used for medical purposes only.  They are not to be used for legal purposes such as employment testing.  Negative results do not necessarily mean the complete absence of a subtance, but rather that the result is less than the cutoff for that substance.  Positive results are unconfirmed and considered Preliminary Positive.  [facility] does not automatically confirm Postitive Unconfirmed results.  The physician may request (order) an Unconfirmed Positive result to be sent out for confirmation.      Negative Thresholds for Drugs Screened:    THC screen, urine                          50 ng/ml  Phenycyclidine (PCP), urine                25 ng/ml  Cocaine screen, urine                     150  ng/ml  Methamphetamine, urine                    500 ng/ml  Opiate screen, urine                      100 ng/ml  Amphetamine screen, urine                 500 ng/ml  Benzodiazepine screen, urine              150 ng/ml  Tricyclic Antidepressants screen, urine   300 ng/ml  Methadone screen, urine                   200 ng/ml  Barbiturates screen, urine                200 ng/ml  Oxycodone screen, urine                   100 ng/ml  Propoxyphene screen, urine                300 ng/ml  Buprenorphine screen, urine                10 ng/ml          Imaging Results (Most Recent)     Procedure Component Value Units Date/Time    XR Hip With or Without Pelvis 2 - 3 View Right [[id number]] Collected: 10/09/20 1833     Updated: 10/09/20 1836    Narrative:      CR Hip Uni Comp Min 2 Vws RT    INDICATION:   Intraoperative radiograph    COMPARISON:   None.    FINDINGS:  Series of fluoroscopic images of open reduction internal fixation of the right femur. See operative report.    IMPRESSION  Series of fluoroscopic images of open reduction internal fixation of the right femur. See operative report    Signer Name: AMY VIEIRA MD   Signed: 10/9/2020 6:33 PM   Workstation [facility]: [facility]    [facility] C Arm During Surgery [[id number]] Resulted: 10/09/20 1830     Updated: 10/09/20 1832    XR Chest 1 View [[id number]] Collected: 10/08/20 1509     Updated: 10/08/20 1512    Narrative:      CHEST X-RAY, 10/08/2020         HISTORY:  50-year-old male in the ED with acute intertrochanteric right femur  fracture after a fall.     TECHNIQUE:  AP portable chest x-ray.     FINDINGS:  Cardiomediastinal silhouette is within normal limits. Shallow lung  expansion. The lungs appear clear. No visible pneumothorax, pulmonary  infiltrate or pleural effusion.       Impression:      Negative chest.     This report was finalized on 10/8/2020 3:09 PM by Dr. Fermin Choi MD.       XR Hip With or Without Pelvis 2 - 3 View Right  [[id number]] Collected: 10/08/20 1503     Updated: 10/08/20 1509    Narrative:      10/08/2020:  1. RIGHT HIP.  2. RIGHT FEMUR.         HISTORY:  50-year-old male in the ED with right hip pain after a fall today.     TECHNIQUE:  Two view right femur series. Two-view right hip series.     FINDINGS:  There is a comminuted, mildly displaced intertrochanteric fracture of  the right femur involving both the greater and lesser trochanters.  Slight varus angulation. Femoral head and neck appear intact. There is  no visible fracture of the acetabulum or right hemipelvis.     Mid and distal portions of the right femur show no acute osseous  abnormality. Moderate degenerative arthropathy at the right knee.       Impression:      Comminuted intertrochanteric right femur fracture.     This report was finalized on 10/8/2020 3:07 PM by Dr. Fermin Choi MD.       XR Femur 2 View Right [[id number]] Collected: 10/08/20 1503     Updated: 10/08/20 1509    Narrative:      10/08/2020:  1. RIGHT HIP.  2. RIGHT FEMUR.         HISTORY:  50-year-old male in the ED with right hip pain after a fall today.     TECHNIQUE:  Two view right femur series. Two-view right hip series.     FINDINGS:  There is a comminuted, mildly displaced intertrochanteric fracture of  the right femur involving both the greater and lesser trochanters.  Slight varus angulation. Femoral head and neck appear intact. There is  no visible fracture of the acetabulum or right hemipelvis.     Mid and distal portions of the right femur show no acute osseous  abnormality. Moderate degenerative arthropathy at the right knee.       Impression:      Comminuted intertrochanteric right femur fracture.     This report was finalized on 10/8/2020 3:07 PM by Dr. Fermin Choi MD.           reviewed    ECG/EMG Results (most recent)     Procedure Component Value Units Date/Time    ECG 12 Lead [[id number]] Collected: 10/08/20 1504     Updated: 10/09/20 1552    Narrative:      HEART  RATE= 104  bpm  RR Interval= 576  ms  ID Interval= 195  ms  P Horizontal Axis= 33  deg  P Front Axis= 68  deg  QRSD Interval= 97  ms  QT Interval= 345  ms  QRS Axis= 45  deg  T Wave Axis= 25  deg  - OTHERWISE NORMAL ECG -  Sinus tachycardia  Ventricular premature complex  No change from prior tracing  Electronically Signed By: Rochelle Damico ([facility]) 09-Oct-2020 15:33:51  Date and Time of Study: 2020-10-08 15:04:36        reviewed    Assessment/Plan   Traumatic Right Hip Fracture   Post operative Anemia  Recent Seizure  DTs  CDC  Esophagitis  Tremor    Agree with transfusion and continued PT/Rehab, daily PPI and finish Vancomycin as planned but avoid further Alcohol and will look to a screening colonoscopy as an OP once his HGB has recovered and he is past Rehab.      I discussed the patients findings and my recommendations with patient.     Pola Wahl MD  10/11/20  09:24 EDT    Time: Not recorded

## 2020-10-11 NOTE — PLAN OF CARE
Goal Outcome Evaluation:  Plan of Care Reviewed With: patient  Progress: no change  Outcome Summary: VSS, Tele SR/ST, tolerating diet, c/o some pain through night, controlled with PO meds, restless through the night, dressing c/d/i, critical H&H this AM, [facility]

## 2020-10-11 NOTE — SIGNIFICANT NOTE
10/11/20 0530   Provider Notification   Reason for Communication Critical lab value  (H&H)   Provider Name Dr. Aquino   Notification Route Phone call   Response See orders

## 2020-10-11 NOTE — PROGRESS NOTES
"    [facility] HOSPITALIST TEAM   PROGRESS NOTE      Patient Care Team:  Liza Sapp PA-C as PCP - General (Family Medicine)    Patient seen at bedside    Hospitalist Team      Patient Care Team:  Provider, No Known as PCP - General          Subjective      Presenting History and Chief Complaints:      Chief Complaint:      Rt Hip Pain    Admission History:    The patient is a 50-year-old male that presented to the emergency department secondary to ground-level fall onto right hip with immediate pain and in ability to bear weight.  He notes that he was \"walking with his walker, turned and wheel caught, tripping me\".  X-Rays done in ED showed Comminuted intertrochanteric right femur fracture. Patient was admitted on Hospitalist service and Orthopedic consult was obtained from Dr Glover, who did Right hip intramedullary nail for treatment of intertrochanteric fracture yesterday. Patient is doing well after surgery.     Interval History and ROS:     Patient States He feels fine and his Hb dropped down to 6.5 this morning. He is refusing blood transfusion  Patient Complaints: AS above  Patient Denies:  Any hx of blood in stool. Says stool color is normal, Denies any sx of fever, headache, shortness of breath, abdominal pain or n/v  History taken from: Patient and Nurse      Objective    Vital Signs  Temp:  [97.4 °F (36.3 °C)-99.6 °F (37.6 °C)] 98.4 °F (36.9 °C)  Heart Rate:  [] 93  Resp:  [18-20] 18  BP: (113-123)/(72-82) 123/82    Flowsheet Rows      First Filed Value   Admission Height  182 cm (71.65\") Documented at 10/08/2020 1321   Admission Weight  113 kg (250 lb) Documented at 10/08/2020 1321              PHYSICAL EXAMINATION:          General: Alert,  male comfortably laying down on the bed and appears neither acutely nor chronically ill.   Vitals- As above  Head- Normocephalic, atrumatic  Eyes- PERRL  Nose- No congestion, edema or discharge   Mouth/throat- moist mucosa w/o " lesions   Neck- supple, thyroid nonpalpable, no LAD   Skin- Warm, dry with no rash or erythema    Lungs- No chest wall pain, No wheezes or crackles   CV- Regular S1, S2 w/o m/g/r, carotids 2+ w/o bruits, JVP normal, rad pulses 2+    Abd- normoactive bowel sounds, soft, NT; no hepatospenalomegaly, no masses or bruits   Ext- S/P Right hip intramedullary nail for treatment of intertrochanteric fracture. Without clubbing, cyanosis or ankle/ pedal edema  Psych: Normal affect  Neuro- Alert, oriented x 3, CN 2-12 intact; Sensation intact to light touch in all 4 ext; No motor deficits  MKS: S/P Right hip intramedullary nail for treatment of intertrochanteric fracture. Muscle strength 5/5 bilaterally, muscle bulk and tone normal.            Results Review:     I reviewed the patient's new clinical results.    Lab Results (last 24 hours)     Procedure Component Value Units Date/Time    Comprehensive Metabolic Panel [[id number]]  (Abnormal) Collected: 10/11/20 0424    Specimen: Blood Updated: 10/11/20 0512     Glucose 137 mg/dL      BUN 11 mg/dL      Creatinine 0.82 mg/dL      Sodium 137 mmol/L      Potassium 4.1 mmol/L      Chloride 106 mmol/L      CO2 22.2 mmol/L      Calcium 8.0 mg/dL      Total Protein 5.3 g/dL      Albumin 2.60 g/dL      ALT (SGPT) 15 U/L      AST (SGOT) 26 U/L      Alkaline Phosphatase 52 U/L      Total Bilirubin 0.7 mg/dL      eGFR Non African Amer 99 mL/min/1.73      Globulin 2.7 gm/dL      A/G Ratio 1.0 g/dL      BUN/Creatinine Ratio 13.4     Anion Gap 8.8 mmol/L     Narrative:      GFR Normal >60  Chronic Kidney Disease <60  Kidney Failure <15      Magnesium [[id number]]  (Normal) Collected: 10/11/20 0424    Specimen: Blood Updated: 10/11/20 0512     Magnesium 1.8 mg/dL     CBC & Differential [[id number]]  (Abnormal) Collected: 10/11/20 0424    Specimen: Blood Updated: 10/11/20 0500    Narrative:      The following orders were created for panel order CBC & Differential.  Procedure                                Abnormality         Status                     ---------                               -----------         ------                     CBC Auto Differential[[id number]]        Abnormal            Final result                 Please view results for these tests on the individual orders.    CBC Auto Differential [[id number]]  (Abnormal) Collected: 10/11/20 0424    Specimen: Blood Updated: 10/11/20 0500     WBC 9.00 10*3/mm3      RBC 2.06 10*6/mm3      Hemoglobin 6.5 g/dL      Hematocrit 20.9 %      .5 fL      MCH 31.6 pg      MCHC 31.1 g/dL      RDW 14.9 %      RDW-SD 54.1 fl      MPV 10.2 fL      Platelets 145 10*3/mm3      Neutrophil % 78.0 %      Lymphocyte % 10.0 %      Monocyte % 11.2 %      Eosinophil % 0.3 %      Basophil % 0.1 %      Immature Grans % 0.4 %      Neutrophils, Absolute 7.01 10*3/mm3      Lymphocytes, Absolute 0.90 10*3/mm3      Monocytes, Absolute 1.01 10*3/mm3      Eosinophils, Absolute 0.03 10*3/mm3      Basophils, Absolute 0.01 10*3/mm3      Immature Grans, Absolute 0.04 10*3/mm3      nRBC 0.0 /100 WBC           Imaging Results (Last 24 Hours)     ** No results found for the last 24 hours. **          Xray reviewed personally by physician.      ECG reviewed personally by physician  ECG/EMG Results (most recent)     Procedure Component Value Units Date/Time    ECG 12 Lead [[id number]] Collected: 10/08/20 1504     Updated: 10/09/20 1552    Narrative:      HEART RATE= 104  bpm  RR Interval= 576  ms  NE Interval= 195  ms  P Horizontal Axis= 33  deg  P Front Axis= 68  deg  QRSD Interval= 97  ms  QT Interval= 345  ms  QRS Axis= 45  deg  T Wave Axis= 25  deg  - OTHERWISE NORMAL ECG -  Sinus tachycardia  Ventricular premature complex  No change from prior tracing  Electronically Signed By: Rochelle Damico ([facility]) 09-Oct-2020 15:33:51  Date and Time of Study: 2020-10-08 15:04:36          Medication Review:   I have reviewed the patient's current medication list    Current  Facility-Administered Medications:   •  apixaban (ELIQUIS) tablet 2.5 mg, 2.5 mg, Oral, Q12H, Adalberto Solo MD, 2.5 mg at 10/10/20 2009  •  bisacodyl (DULCOLAX) EC tablet 5 mg, 5 mg, Oral, Daily PRN, Adalberto Solo MD  •  bisacodyl (DULCOLAX) suppository 10 mg, 10 mg, Rectal, Daily PRN, Adalberto Solo MD  •  chlordiazePOXIDE (LIBRIUM) capsule 10 mg, 10 mg, Oral, Q6H, Adalberto Solo MD, 10 mg at 10/11/20 0622  •  famotidine (PEPCID) tablet 40 mg, 40 mg, Oral, Daily, Adalberto Solo MD, 40 mg at 10/10/20 0815  •  folic acid (FOLVITE) tablet 1 mg, 1 mg, Oral, Daily, Adalberto Solo MD, 1 mg at 10/10/20 0815  •  LORazepam (ATIVAN) tablet 1 mg, 1 mg, Oral, Q2H PRN, 1 mg at 10/10/20 0500 **OR** LORazepam (ATIVAN) injection 1 mg, 1 mg, Intravenous, Q2H PRN **OR** LORazepam (ATIVAN) tablet 2 mg, 2 mg, Oral, Q1H PRN **OR** LORazepam (ATIVAN) injection 2 mg, 2 mg, Intravenous, Q1H PRN **OR** LORazepam (ATIVAN) injection 2 mg, 2 mg, Intravenous, Q15 Min PRN **OR** LORazepam (ATIVAN) injection 2 mg, 2 mg, Intramuscular, Q15 Min PRN, Adalberto Solo MD  •  magnesium hydroxide (MILK OF MAGNESIA) suspension 2400 mg/10mL 10 mL, 10 mL, Oral, Daily PRN, Adalberto Solo MD  •  Magnesium Oxide tablet 400 mg, 400 mg, Oral, Daily, Adalberto Solo MD, 400 mg at 10/10/20 0816  •  melatonin tablet 5 mg, 5 mg, Oral, Nightly PRN, Adalberto Solo MD  •  morphine injection 4 mg, 4 mg, Intravenous, Q4H PRN, 4 mg at 10/09/20 0916 **AND** naloxone (NARCAN) injection 0.4 mg, 0.4 mg, Intravenous, Q5 Min PRN, Adalberto Solo MD  •  multivitamin with minerals 1 tablet, 1 tablet, Oral, Daily, Adalberto Solo MD, 1 tablet at 10/10/20 0815  •  nitroglycerin (NITROSTAT) SL tablet 0.4 mg, 0.4 mg, Sublingual, Q5 Min PRN, Adalberto Solo MD  •  ondansetron (ZOFRAN) tablet 4 mg, 4 mg, Oral, Q6H PRN **OR** ondansetron (ZOFRAN) injection 4 mg, 4 mg, Intravenous, Q6H PRN, Adalberto Solo MD  •   ondansetron (ZOFRAN) tablet 4 mg, 4 mg, Oral, Q6H PRN **OR** ondansetron (ZOFRAN) injection 4 mg, 4 mg, Intravenous, Q6H PRN, Adalberto Solo MD  •  oxyCODONE-acetaminophen (PERCOCET) 7.5-325 MG per tablet 1 tablet, 1 tablet, Oral, Q4H PRN, Adalberto Solo MD  •  oxyCODONE-acetaminophen (PERCOCET) 7.5-325 MG per tablet 2 tablet, 2 tablet, Oral, Q4H PRN, Adalberto Solo MD, 2 tablet at 10/11/20 0039  •  [COMPLETED] Insert peripheral IV, , , Once **AND** sodium chloride 0.9 % flush 10 mL, 10 mL, Intravenous, PRN, Adalberto Solo MD  •  sodium chloride 0.9 % flush 10 mL, 10 mL, Intravenous, PRN, Adalberto Solo MD  •  sodium chloride 0.9 % flush 10 mL, 10 mL, Intravenous, Q12H, Adalberto Solo MD, 10 mL at 10/10/20 2009  •  sodium chloride 0.9 % infusion 40 mL, 40 mL, Intravenous, PRN, Adalberto Solo MD  •  vancomycin (VANCOCIN) capsule 125 mg, 125 mg, Oral, 4x Daily, Adalberto Solo MD, 125 mg at 10/10/20 2009      Assessment/Plan           PLAN:    -Labs and diagnostic tests reviewed: Yes    -Diagnostic tests reviewed: Yes    -Consultations: Orthopedics and GI    -Any new recommendations: Blood transfusion today and as per consultants    -New Labs ordered: HCT q6h    -New diagnostic tests ordered: N/A    -Any changes in medications:  MEDICATION CHANGES:   New Medications added: As per order sheet   Medication Dose changed: N/A   Medications deleted: Apixaban    -Placement issues: N/A    -Patient is clinically and hemodynamically stable    -To continue current management and supportive care    -Will follow patient closely    -Nothing new to add for right now    -Discharge planning issues: Patient should be able to go back home once ready for discharge      DIAGNOSES:      PRIMARY DIAGNOSES:      Severe Anemia: Hb dropped down to 6.5 from 7.9 yesterday. Patient will receive 2 units of packed cells today. I will D/C Apixaban. Patient was little argumentative and refused blood transfusion  initially but is agreeable to that. Reports stool is normal color and he never had blood in stool. Is 50 year old and never had colonoscopy or endoscopy. I will request GI consult from Dr Wahl    Acute comminuted intertrochanteric fracture right:   Right hip intramedullary nail for treatment of intertrochanteric fracture by Dr Solo yesterday  POD #1  Doing well  Control pain with morphine and hydrocodone as needed  Management as per Ortho     Chronic pancytopenia/macrocytic anemia with coagulopathy:  Recent melena/ABLA/GIB  INR 1.42, hemoglobin 9.7, platelets 114,000 no active blood loss noted  Monitor     Alcohol dependence with acute alcohol withdrawal, recent seizure:  CIWA protocol initiated, start Librium scheduled  Rally bag in process  Seizure/fall precautions/telemetry/pulse ox  During recent admission was offered inpatient alcohol admission and declined  Currently denies alcohol dependence  Monitor closely     Coarse tremors: Most likely associated with anxiety and alcohol withdrawl     C diff Infection:  Continue oral vancomycin sliding to manage 10/14/2020     Fatty liver disease:   Noted during recent admission     GERD:   On Inj Famotidine. NO acute issues     Depression:   Hx noted. No acute issues     DJD:   Hx noted. No acute issues     Hx of Hypomagnesemia:   On replacement     Hypocalcemia: Serum calcium 7.9 with albumin 2.50. Corrected serum calcium 8.7, which is normal     Dysphagia:   Noted during this admission with mechanical soft/thins recommended     Anthropometric Analysis: BMI:  33.91 (Obesity Class I)     CODE Status: FULL CODE     Tobacco use: N/A     Alcohol intake: YES. But says he does not drink daily     Illegal Drug use: N/A     DVT Prophylaxis: On Apixaban and SCDs             ?     SECONDARY DIAGNOSES:  ?     As above      SURGICAL DIAGNOSES:      As per Problem List      Plan for disposition: Patient should be able to go back to Home/NH once ready for  discharge    Adam Rivera MD  10/11/20  07:08 EDT            Adam Rivera M.D.; MS; FACP; MPH; [facility]  Internal Medicine/ Hospitalist          Time: 45 minutes

## 2020-10-12 LAB
BH BB BLOOD EXPIRATION DATE: NORMAL
BH BB BLOOD EXPIRATION DATE: NORMAL
BH BB BLOOD TYPE BARCODE: 5100
BH BB BLOOD TYPE BARCODE: 5100
BH BB DISPENSE STATUS: NORMAL
BH BB DISPENSE STATUS: NORMAL
BH BB PRODUCT CODE: NORMAL
BH BB PRODUCT CODE: NORMAL
BH BB UNIT NUMBER: NORMAL
BH BB UNIT NUMBER: NORMAL
CROSSMATCH INTERPRETATION: NORMAL
CROSSMATCH INTERPRETATION: NORMAL
HCT VFR BLD AUTO: 27.2 % (ref 37.5–51)
HGB BLD-MCNC: 8.5 G/DL (ref 13–17.7)
UNIT  ABO: NORMAL
UNIT  ABO: NORMAL
UNIT  RH: NORMAL
UNIT  RH: NORMAL

## 2020-10-12 PROCEDURE — 97530 THERAPEUTIC ACTIVITIES: CPT

## 2020-10-12 PROCEDURE — 99233 SBSQ HOSP IP/OBS HIGH 50: CPT | Performed by: INTERNAL MEDICINE

## 2020-10-12 PROCEDURE — 97116 GAIT TRAINING THERAPY: CPT

## 2020-10-12 PROCEDURE — 97535 SELF CARE MNGMENT TRAINING: CPT

## 2020-10-12 PROCEDURE — 85018 HEMOGLOBIN: CPT | Performed by: INTERNAL MEDICINE

## 2020-10-12 PROCEDURE — 85014 HEMATOCRIT: CPT | Performed by: INTERNAL MEDICINE

## 2020-10-12 PROCEDURE — 99231 SBSQ HOSP IP/OBS SF/LOW 25: CPT | Performed by: INTERNAL MEDICINE

## 2020-10-12 RX ADMIN — OXYCODONE HYDROCHLORIDE AND ACETAMINOPHEN 2 TABLET: 7.5; 325 TABLET ORAL at 16:04

## 2020-10-12 RX ADMIN — VANCOMYCIN HYDROCHLORIDE 125 MG: 125 CAPSULE ORAL at 09:02

## 2020-10-12 RX ADMIN — VANCOMYCIN HYDROCHLORIDE 125 MG: 125 CAPSULE ORAL at 21:03

## 2020-10-12 RX ADMIN — OXYCODONE HYDROCHLORIDE AND ACETAMINOPHEN 2 TABLET: 7.5; 325 TABLET ORAL at 10:08

## 2020-10-12 RX ADMIN — MULTIPLE VITAMINS W/ MINERALS TAB 1 TABLET: TAB at 09:02

## 2020-10-12 RX ADMIN — CHLORDIAZEPOXIDE HYDROCHLORIDE 10 MG: 5 CAPSULE ORAL at 23:31

## 2020-10-12 RX ADMIN — OXYCODONE HYDROCHLORIDE AND ACETAMINOPHEN 2 TABLET: 7.5; 325 TABLET ORAL at 21:03

## 2020-10-12 RX ADMIN — OXYCODONE HYDROCHLORIDE AND ACETAMINOPHEN 2 TABLET: 7.5; 325 TABLET ORAL at 04:52

## 2020-10-12 RX ADMIN — VANCOMYCIN HYDROCHLORIDE 125 MG: 125 CAPSULE ORAL at 12:28

## 2020-10-12 RX ADMIN — SODIUM CHLORIDE, PRESERVATIVE FREE 10 ML: 5 INJECTION INTRAVENOUS at 09:03

## 2020-10-12 RX ADMIN — CHLORDIAZEPOXIDE HYDROCHLORIDE 10 MG: 5 CAPSULE ORAL at 17:58

## 2020-10-12 RX ADMIN — FOLIC ACID 1 MG: 1 TABLET ORAL at 09:02

## 2020-10-12 RX ADMIN — FAMOTIDINE 20 MG: 10 INJECTION INTRAVENOUS at 21:03

## 2020-10-12 RX ADMIN — CHLORDIAZEPOXIDE HYDROCHLORIDE 10 MG: 5 CAPSULE ORAL at 05:00

## 2020-10-12 RX ADMIN — FAMOTIDINE 20 MG: 10 INJECTION INTRAVENOUS at 09:02

## 2020-10-12 RX ADMIN — VANCOMYCIN HYDROCHLORIDE 125 MG: 125 CAPSULE ORAL at 17:58

## 2020-10-12 RX ADMIN — CHLORDIAZEPOXIDE HYDROCHLORIDE 10 MG: 5 CAPSULE ORAL at 12:28

## 2020-10-12 RX ADMIN — Medication 400 MG: at 09:02

## 2020-10-12 RX ADMIN — SODIUM CHLORIDE, PRESERVATIVE FREE 10 ML: 5 INJECTION INTRAVENOUS at 21:03

## 2020-10-12 NOTE — PROGRESS NOTES
POD# 3 s/p right hip nail    Subjective: Simone Boykin resting in bed this a.m. received 2 units packed red blood cells 1 day ago hemoglobin today 8.5, hematocrit 27.2.  Does continue to experience tremors bilateral upper extremities.  Denies residual numbness or tingling to the right lower extremity.    Objective:  Vitals:    10/11/20 1730 10/11/20 1900 10/11/20 2300 10/12/20 0557   BP: 141/92 127/67 120/75 120/79   BP Location:       Patient Position:       Pulse: 96 95 102 102   Resp: 20 19 19 19   Temp: 98.4 °F (36.9 °C) 98.1 °F (36.7 °C) 98.2 °F (36.8 °C) 98.7 °F (37.1 °C)   TempSrc: Oral Oral Oral Oral   SpO2: 99% 97% 95% 97%   Weight:       Height:           Results from last 7 days   Lab Units 10/12/20  0441 10/11/20  0424 10/10/20  0538  10/09/20  0958 10/08/20  1458   WBC 10*3/mm3  --  9.00  --   --  5.17 5.25   HEMOGLOBIN g/dL 8.5* 6.5* 7.9*   < > 8.4* 8.7*   HEMATOCRIT % 27.2* 20.9* 24.2*   < > 26.4* 27.5*   PLATELETS 10*3/mm3  --  145  --   --  118* 114*    < > = values in this interval not displayed.       Results from last 7 days   Lab Units 10/11/20  0424 10/10/20  0538 10/09/20  0958   SODIUM mmol/L 137 134* 134*   POTASSIUM mmol/L 4.1 5.0 3.7   CHLORIDE mmol/L 106 105 104   CO2 mmol/L 22.2 17.1* 21.2*   BUN mg/dL 11 10 10   CREATININE mg/dL 0.82 0.83 0.76   GLUCOSE mg/dL 137* 195* 91   CALCIUM mg/dL 8.0* 7.9* 7.9*       Exam:    Right lower extremity examined  Positive sensation light touch all distributions right lower extremity  Negative calf tenderness, negative Homans sign  Flex extend all digits right foot, dorsiflexion plantarflexion intact right ankle  2+ dorsalis pedis pulse  Brisk cap refill all digits right foot    Impression: s/p right hip nail    Plan:  1. PT/OT- ambulate, partial weight-bearing right lower extremity  2. Pain control-Percocet  3. DVT prophylaxis-ambulate, SCDs, Eliquis discontinued on 10/11/2020  4. Wound care-postop dressing removed Dermabond to remain intact  until follow-up in office in 2 weeks with x-ray imaging  5. Disposition- home with home health therapy versus rehab-likely will need rehab at time of discharge.  Disposition per hospitalist

## 2020-10-12 NOTE — THERAPY TREATMENT NOTE
Acute Care - Occupational Therapy Treatment Note   Noris Ballesteros     Patient Name: Simone Boykin  : 1970  MRN: [id number]  Today's Date: 10/12/2020  Onset of Illness/Injury or Date of Surgery: 10/08/20  Date of Referral to OT: 10/09/20  Referring Physician: Dr. Solo    Admit Date: 10/8/2020       ICD-10-CM ICD-9-CM   1. Closed comminuted intertrochanteric fracture of right femur, initial encounter (CMS/[facility])  S72.141A 820.21   2. History of alcohol abuse  F10.11 305.03   3. Clostridioides difficile infection  A49.8 041.84     Patient Active Problem List   Diagnosis   • Neck mass   • Anxiety   • Pain of left lower extremity   • Swelling of lower leg   • Hematoma   • Closed comminuted intertrochanteric fracture of proximal end of right femur (CMS/[facility])     Past Medical History:   Diagnosis Date   • Anxiety    • Arthritis     JUAN KNEES   • Depression    • Fall    • GERD (gastroesophageal reflux disease)    • Heartburn     ON OCC   • Mass     BASE OF POSTERIOR NECK   • Shoulder dislocation     LONG TERM ISSUES WITH     Past Surgical History:   Procedure Laterality Date   • EXCISION MASS HEAD/NECK N/A 2016    Procedure: MASS SOFT TISSUE EXCISION POSTERIOR NECK;  Surgeon: Crow Ayala Jr., MD;  Location: [facility] OR;  Service:    • KNEE ARTHROSCOPY Right           OT ASSESSMENT FLOWSHEET (last 12 hours)      OT Evaluation and Treatment     Row Name 10/12/20 1349 10/12/20 1339 10/12/20 0953             OT Time and Intention    Subjective Information  complains of;pain  -EN  --  complains of;pain  -EN      Document Type  therapy note (daily note)  -EN  --  therapy note (daily note)  -EN      Mode of Treatment  occupational therapy  -EN  --  occupational therapy  -EN      Patient Effort  good  -EN  --  good  -EN      Symptoms Noted During/After Treatment  increased pain  -EN  --  increased pain  -EN         Cognition    Personal Safety Interventions  gait belt;nonskid shoes/slippers when out of bed  -EN   --  gait belt;nonskid shoes/slippers when out of bed  -EN         Pain Scale: Numbers Pre/Post-Treatment    Pretreatment Pain Rating  5/10  -EN  --  7/10  -EN      Posttreatment Pain Rating  5/10  -EN  --  7/10  -EN      Pain Intervention(s)  Repositioned  -EN  --  Repositioned  -EN         Pain Scale: Word Pre/Post-Treatment    Pain Location - Side  Right  -EN  --  Right  -EN      Pain Location  hip  -EN  --  hip  -EN         Bed Mobility    Bed Mobility  sit-supine  -EN  --  supine-sit  -EN      Supine-Sit [place] (Bed Mobility)  verbal cues;moderate assist (50% patient effort)  -EN  --  verbal cues;moderate assist (50% patient effort);2 person assist  -EN      Bed Mobility, Safety Issues  decreased use of legs for bridging/pushing  -EN  --  decreased use of legs for bridging/pushing  -EN      Assistive Device (Bed Mobility)  bed rails  -EN  --  head of bed elevated  -EN      Comment (Bed Mobility)  --  --  continues to have shaking/tremors   -EN         Functional Mobility    Functional Mobility- Ind. Level  verbal cues required;moderate assist (50% patient effort);2 person assist required  -EN  --  verbal cues required;moderate assist (50% patient effort);2 person assist required  -EN      Functional Mobility- Device  rolling walker  -EN  --  rolling walker  -EN      Functional Mobility-Distance (Feet)  -- 4-5 small steps  -EN  --  -- 4-5 small steps  -EN      Functional Mobility- Comment  continues to have shaking/tremors  -EN  --  --         Transfer Assessment/Treatment    Transfers  sit-stand transfer;stand-sit transfer  -EN  --  sit-stand transfer;stand-sit transfer  -EN      Maintains Weight-bearing Status (Transfers)  able to maintain  -EN  --  able to maintain  -EN      Comment (Transfers)  verbal cues for hand placement  -EN  --  verbal cues for hand placement  -EN         Transfers    Sit-Stand [place] (Transfers)  verbal cues;moderate assist (50% patient effort);2 person assist  -EN  --   verbal cues;moderate assist (50% patient effort);2 person assist  -EN      Stand-Sit [place] (Transfers)  verbal cues;minimum assist (75% patient effort)  -EN  --  verbal cues;minimum assist (75% patient effort)  -EN         Sit-Stand Transfer    Assistive Device (Sit-Stand Transfers)  walker, front-wheeled  -EN  --  walker, front-wheeled  -EN         Stand-Sit Transfer    Assistive Device (Stand-Sit Transfers)  walker, front-wheeled  -EN  --  walker, front-wheeled  -EN         Lower Body Dressing Assessment/Training    Comment (Lower Body Dressing)  Patient issued reacher and educated on use for donning LB clothing. Patient issued sock aid and educated on use. Attempted to don however required max assist (foot was damp).   -EN  --  --         Wound 10/09/20 1700 Right lateral hip Incision    Wound - Properties Group Placement Date: 10/09/20  -ME Placement Time: 1700  -ME Side: Right  -ME Orientation: lateral  -ME Location: hip  -ME, hip and leg incisions  Primary Wound Type: Incision  -ME Additional Comments: prineo, telfa, tegaderms  -ME    Retired Wound - Properties Group Date first assessed: 10/09/20  -ME Time first assessed: 1700  -ME Side: Right  -ME Location: hip  -ME, hip and leg incisions  Primary Wound Type: Incision  -ME Additional Comments: prineo, telfa, tegaderms  -ME       Plan of Care Review    Plan of Care Reviewed With  patient  -EN  --  patient  -EN      Progress  no change  -EN  --  no change  -EN      Outcome Summary  OT- Patient required mod A X 2 for sit to stand from recliner. Patient took 4-5 small steps with rolling walker (continues to have shakiness/tremors). Patient issued reacher and sock aid and educated on use for LBD. Continue to recommend SNF at discharge.  -EN  --  OT- Patient required mod A X 2 for supine to sit and mod A X 2 for sit to stand from EOB. Patient took 4-5 small steps with RW and mod X 2. (limited by pain and skaking/tremors). Continue to recommend SNF at  discharge.  -EN         Transfer Goal 1 (OT)    Activity/Assistive Device (Transfer Goal 1, OT)  --  sit-to-stand/stand-to-sit;toilet;commode, bedside without drop arms;walker, rolling  -EN  --      Cade Level/Cues Needed (Transfer Goal 1, OT)  --  minimum assist (75% or more patient effort)  -EN  --      Time Frame (Transfer Goal 1, OT)  --  by discharge  -EN  --      Progress/Outcome (Transfer Goal 1, OT)  --  progress slower than expected mod A X 2 currently  -EN  --         Dressing Goal 1 (OT)    Activity/Device (Dressing Goal 1, OT)  --  lower body dressing;long-handled shoe horn;reacher;sock-aid  -EN  --      Cade/Cues Needed (Dressing Goal 1, OT)  --  minimum assist (75% or more patient effort)  -EN  --      Time Frame (Dressing Goal 1, OT)  --  by discharge  -EN  --      Progress/Outcome (Dressing Goal 1, OT)  --  other (see comments);progress slower than expected max A with AE  -EN  --         Positioning and Restraints    Pre-Treatment Position  --  --  in bed  -EN      Post Treatment Position  --  --  chair  -EN      In Chair  --  --  reclined;call light within reach;encouraged to call for assist  -EN        User Key  (r) = Recorded By, (t) = Taken By, (c) = Cosigned By    Initials Name Effective Dates    Rosalba Meraz OTR 07/05/20 -     ME Izabella Lyons, RN 04/10/20 -          Occupational Therapy Education                 Title: PT OT SLP Therapies (In Progress)     Topic: Occupational Therapy (Done)     Point: ADL training (Done)     Description:   Instruct learner(s) on proper safety adaptation and remediation techniques during self care or transfers.   Instruct in proper use of assistive devices.              Learning Progress Summary           Patient Acceptance, E, VU by EN at 10/12/2020 1419    Comment: Safety during transfers, ADL retraining, PWB status    Acceptance, E, VU by EN at 10/10/2020 1055    Comment: Safety, transfers, PWB status                   Point:  Precautions (Done)     Description:   Instruct learner(s) on prescribed precautions during self-care and functional transfers.              Learning Progress Summary           Patient Acceptance, E, VU by EN at 10/12/2020 1419    Comment: Safety during transfers, ADL retraining, PWB status    Acceptance, E, VU by EN at 10/10/2020 1055    Comment: Safety, transfers, PWB status                               User Key     Initials Effective Dates Name Provider Type Discipline    EN 07/05/20 -  Rosalba Voss, OTR Occupational Therapist OT                  OT Recommendation and Plan  Planned Therapy Interventions (OT): BADL retraining, adaptive equipment training, transfer/mobility retraining  Therapy Frequency (OT): 5 times/wk  Plan of Care Review  Plan of Care Reviewed With: patient  Progress: no change  Outcome Summary: OT- Patient required mod A X 2 for sit to stand from recliner. Patient took 4-5 small steps with rolling walker (continues to have shakiness/tremors). Patient issued reacher and sock aid and educated on use for LBD. Continue to recommend SNF at discharge.  Plan of Care Reviewed With: patient  Outcome Summary: OT- Patient required mod A X 2 for sit to stand from recliner. Patient took 4-5 small steps with rolling walker (continues to have shakiness/tremors). Patient issued reacher and sock aid and educated on use for LBD. Continue to recommend SNF at discharge.    Outcome Measures     Row Name 10/12/20 1400 10/12/20 1200 10/10/20 1000       How much help from another person do you currently need...    Turning from your back to your side while in flat bed without using bedrails?  --  2  -LH  --    Moving from lying on back to sitting on the side of a flat bed without bedrails?  --  2  -LH  --    Moving to and from a bed to a chair (including a wheelchair)?  --  2  -LH  --    Standing up from a chair using your arms (e.g., wheelchair, bedside chair)?  --  2  -LH  --    Climbing 3-5 steps with a  railing?  --  1  -LH  --    To walk in hospital room?  --  2  -LH  --    AM-PAC 6 Clicks Score (PT)  --  11  -LH  --       How much help from another is currently needed...    Putting on and taking off regular lower body clothing?  2  -EN  --  2  -EN    Bathing (including washing, rinsing, and drying)  2  -EN  --  2  -EN    Toileting (which includes using toilet bed pan or urinal)  2  -EN  --  2  -EN    Putting on and taking off regular upper body clothing  4  -EN  --  4  -EN    Taking care of personal grooming (such as brushing teeth)  4  -EN  --  4  -EN    Eating meals  4  -EN  --  4  -EN    AM-PAC 6 Clicks Score (OT)  18  -EN  --  18  -EN       Functional Assessment    Outcome Measure Options  --  AM-PAC 6 Clicks Basic Mobility (PT)  -  AM-PAC 6 Clicks Daily Activity (OT)  -EN    Row Name 10/10/20 0927             How much help from another person do you currently need...    Turning from your back to your side while in flat bed without using bedrails?  2  -LN      Moving from lying on back to sitting on the side of a flat bed without bedrails?  2  -LN      Moving to and from a bed to a chair (including a wheelchair)?  2  -LN      Standing up from a chair using your arms (e.g., wheelchair, bedside chair)?  2  -LN      Climbing 3-5 steps with a railing?  1  -LN      To walk in hospital room?  3  -LN      AM-PAC 6 Clicks Score (PT)  12  -LN         Functional Assessment    Outcome Measure Options  AM-[facility] 6 Clicks Basic Mobility (PT)  -LN        User Key  (r) = Recorded By, (t) = Taken By, (c) = Cosigned By    Initials Name Provider Type     Graciela Oliveira, PT Physical Therapist    EN Rosalba Voss, OTR Occupational Therapist    LN Nanette Rose, PT Physical Therapist          Time Calculation:   Time Calculation- OT     Row Name 10/12/20 1349 10/12/20 0953          Time Calculation- OT    OT Start Time  1338  -EN  0952  -EN     OT Stop Time  1353  -EN  1010  -EN     OT Time Calculation (min)   15 min  -EN  18 min  -EN        Timed Charges    53851 - OT Therapeutic Activity Minutes  --  18  -EN     40335 - OT Self Care/Mgmt Minutes  15  -EN  --       User Key  (r) = Recorded By, (t) = Taken By, (c) = Cosigned By    Initials Name Provider Type    Rosalba Meraz OTR Occupational Therapist        Therapy Charges for Today     Code Description Service Date Service Provider Modifiers Qty    51953217911  OT THERAPEUTIC ACT EA 15 MIN 10/12/2020 Rosalba Voss OTR GO 1    08710501418 HC OT SELF CARE/MGMT/TRAIN EA 15 MIN 10/12/2020 Rosalba Voss OTR GO 1               COOPER Lima  10/12/2020

## 2020-10-12 NOTE — PLAN OF CARE
Goal Outcome Evaluation:  Plan of Care Reviewed With: patient  Progress: no change  Outcome Summary: Vital signs stable. Telemetry remains sinus rhythm and sinus tach. No complaints of chest pain or shortness of air. Pain tolerated with PO pain meds. Dressing remains clean, dry and intact. Took a bath and had a bed change last evening.

## 2020-10-12 NOTE — PROGRESS NOTES
GI Daily Progress Note    Assessment/Plan:      Closed comminuted intertrochanteric fracture of proximal end of right femur (CMS/HCC)       LOS: 4 days     Simone Boykin is a 50 y.o. male who was admitted with  Closed comminuted intertrochanteric fracture of proximal end of right femur (CMS/HCC) . He reports the symptoms are improving with treatment. HGB up exactly 2 grams and he is more ambulatory    Subjective:    Patient expresses No GI complaints  Patient denies abdominal pain, diarrhea and bloody stools    Objective:    Vital signs in last 24 hours:  Temp:  [97.8 °F (36.6 °C)-99.2 °F (37.3 °C)] 99.2 °F (37.3 °C)  Heart Rate:  [] 98  Resp:  [17-19] 18  BP: (119-129)/(69-88) 129/88    Intake/Output last 3 shifts:  I/O last 3 completed shifts:  In: 1485.4 [P.O.:840; Blood:645.4]  Out: 1600 [Urine:1600]  Intake/Output this shift:  No intake/output data recorded.    Physical Exam:Abdomen  Sounds Normal Active Bowel Sounds   Distension Soft   Tenderness Nontender     Imaging Results (Last 72 Hours)     Procedure Component Value Units Date/Time    FL C Arm During Surgery [[id number]] Resulted: 10/12/20 1649     Updated: 10/12/20 1649          WBC   Date Value Ref Range Status   10/11/2020 9.00 3.40 - 10.80 10*3/mm3 Final     RBC   Date Value Ref Range Status   10/11/2020 2.06 (L) 4.14 - 5.80 10*6/mm3 Final     Hemoglobin   Date Value Ref Range Status   10/12/2020 8.5 (L) 13.0 - 17.7 g/dL Final     Hematocrit   Date Value Ref Range Status   10/12/2020 27.2 (L) 37.5 - 51.0 % Final     MCV   Date Value Ref Range Status   10/11/2020 101.5 (H) 79.0 - 97.0 fL Final     MCH   Date Value Ref Range Status   10/11/2020 31.6 26.6 - 33.0 pg Final     MCHC   Date Value Ref Range Status   10/11/2020 31.1 (L) 31.5 - 35.7 g/dL Final     RDW   Date Value Ref Range Status   10/11/2020 14.9 12.3 - 15.4 % Final     RDW-SD   Date Value Ref Range Status   10/11/2020 54.1 (H) 37.0 - 54.0 fl Final     MPV   Date Value Ref Range  Status   10/11/2020 10.2 6.0 - 12.0 fL Final     Platelets   Date Value Ref Range Status   10/11/2020 145 140 - 450 10*3/mm3 Final     Neutrophil %   Date Value Ref Range Status   10/11/2020 78.0 (H) 42.7 - 76.0 % Final     Lymphocyte %   Date Value Ref Range Status   10/11/2020 10.0 (L) 19.6 - 45.3 % Final     Monocyte %   Date Value Ref Range Status   10/11/2020 11.2 5.0 - 12.0 % Final     Eosinophil %   Date Value Ref Range Status   10/11/2020 0.3 0.3 - 6.2 % Final     Basophil %   Date Value Ref Range Status   10/11/2020 0.1 0.0 - 1.5 % Final     Immature Grans %   Date Value Ref Range Status   10/11/2020 0.4 0.0 - 0.5 % Final     Neutrophils, Absolute   Date Value Ref Range Status   10/11/2020 7.01 (H) 1.70 - 7.00 10*3/mm3 Final     Lymphocytes, Absolute   Date Value Ref Range Status   10/11/2020 0.90 0.70 - 3.10 10*3/mm3 Final     Monocytes, Absolute   Date Value Ref Range Status   10/11/2020 1.01 (H) 0.10 - 0.90 10*3/mm3 Final     Eosinophils, Absolute   Date Value Ref Range Status   10/11/2020 0.03 0.00 - 0.40 10*3/mm3 Final     Basophils, Absolute   Date Value Ref Range Status   10/11/2020 0.01 0.00 - 0.20 10*3/mm3 Final     Immature Grans, Absolute   Date Value Ref Range Status   10/11/2020 0.04 0.00 - 0.05 10*3/mm3 Final     nRBC   Date Value Ref Range Status   10/11/2020 0.0 0.0 - 0.2 /100 WBC Final       Glucose   Date Value Ref Range Status   10/11/2020 137 (H) 65 - 99 mg/dL Final     Sodium   Date Value Ref Range Status   10/11/2020 137 136 - 145 mmol/L Final     Potassium   Date Value Ref Range Status   10/11/2020 4.1 3.5 - 5.2 mmol/L Final     CO2   Date Value Ref Range Status   10/11/2020 22.2 22.0 - 29.0 mmol/L Final     Chloride   Date Value Ref Range Status   10/11/2020 106 98 - 107 mmol/L Final     Anion Gap   Date Value Ref Range Status   10/11/2020 8.8 5.0 - 15.0 mmol/L Final     Creatinine   Date Value Ref Range Status   10/11/2020 0.82 0.76 - 1.27 mg/dL Final     BUN   Date Value Ref Range  Status   10/11/2020 11 6 - 20 mg/dL Final     BUN/Creatinine Ratio   Date Value Ref Range Status   10/11/2020 13.4 7.0 - 25.0 Final     Calcium   Date Value Ref Range Status   10/11/2020 8.0 (L) 8.6 - 10.5 mg/dL Final     eGFR Non  Amer   Date Value Ref Range Status   10/11/2020 99 >60 mL/min/1.73 Final     Alkaline Phosphatase   Date Value Ref Range Status   10/11/2020 52 39 - 117 U/L Final     Total Protein   Date Value Ref Range Status   10/11/2020 5.3 (L) 6.0 - 8.5 g/dL Final     ALT (SGPT)   Date Value Ref Range Status   10/11/2020 15 1 - 41 U/L Final     AST (SGOT)   Date Value Ref Range Status   10/11/2020 26 1 - 40 U/L Final     Total Bilirubin   Date Value Ref Range Status   10/11/2020 0.7 0.0 - 1.2 mg/dL Final     Albumin   Date Value Ref Range Status   10/11/2020 2.60 (L) 3.50 - 5.20 g/dL Final     Globulin   Date Value Ref Range Status   10/11/2020 2.7 gm/dL Final     Traumatic Right Hip Fracture   Post operative Anemia  Recent Seizure  DTs  CDC  Esophagitis  Tremor    Good response to transfusion arguing against any active bleeding and is to go to rehab soon (?tomorrow?) so will look to see as OP in 8 weeks.Will be glad to see anytime.

## 2020-10-12 NOTE — PLAN OF CARE
Goal Outcome Evaluation:  Plan of Care Reviewed With: patient  Progress: improving  Outcome Summary: vss, patient able to get up to chair with assist this shift.  pain control working well with current meds. sinus rhythm on telemetry.

## 2020-10-12 NOTE — PLAN OF CARE
Problem: Adult Inpatient Plan of Care  Goal: Plan of Care Review  Recent Flowsheet Documentation  Taken 10/12/2020 1349 by Rosalba Voss OTR  Progress: no change  Plan of Care Reviewed With: patient  Outcome Summary: OT- Patient required mod A X 2 for sit to stand from recliner. Patient took 4-5 small steps with rolling walker (continues to have shakiness/tremors). Patient issued reacher and sock aid and educated on use for LBD. Continue to recommend SNF at discharge.  Taken 10/12/2020 0953 by Rosalba Voss OTR  Progress: no change  Plan of Care Reviewed With: patient  Outcome Summary: OT- Patient required mod A X 2 for supine to sit and mod A X 2 for sit to stand from EOB. Patient took 4-5 small steps with RW and mod X 2. (limited by pain and skaking/tremors). Continue to recommend SNF at discharge.

## 2020-10-12 NOTE — PLAN OF CARE
Problem: Adult Inpatient Plan of Care  Goal: Plan of Care Review  Recent Flowsheet Documentation  Taken 10/12/2020 1338 by Graciela Oliveira, PT  Plan of Care Reviewed With: patient  Outcome Summary: PT - patient continues to require mod A x 2 for sit to stand transfers and was unable to progress ambulation this afternoon, taking 4-5 small steps forward.  Mobility limited by pain and shaking/tremors.  Continue to recommend SNF at discharge.

## 2020-10-12 NOTE — PLAN OF CARE
Problem: Adult Inpatient Plan of Care  Goal: Plan of Care Review  Recent Flowsheet Documentation  Taken 10/12/2020 0952 by Graciela Oliveira, PT  Progress: no change  Plan of Care Reviewed With: patient  Outcome Summary: PT:  Patient required mod A x 2 for bed mobility and for sit to stand transfer.  He was able to take 4-5 small steps with rolling walker, maintaining partial weightbearing right LE.  Mobility limited by pain and shaking/tremors.  Patient would benefit from SNF for continued rehab prior to d/c home.

## 2020-10-12 NOTE — THERAPY TREATMENT NOTE
Acute Care - Physical Therapy Treatment Note   [place]     Patient Name: Simone Boykin  : 1970  MRN: [id number]  Today's Date: 10/12/2020   Onset of Illness/Injury or Date of Surgery: 10/08/20       PT Assessment (last 12 hours)      PT Evaluation and Treatment     Row Name 10/12/20 0952          Physical Therapy Time and Intention    Subjective Information  complains of;pain  -     Document Type  therapy note (daily note)  -     Mode of Treatment  physical therapy  -     Patient Effort  good  -     Symptoms Noted During/After Treatment  increased pain  -     Row Name 10/12/20 0952          Pain Scale: Numbers Pre/Post-Treatment    Pretreatment Pain Rating  10  -     Posttreatment Pain Rating  7/10  -     Row Name 10/12/20 0952          Pain Scale: Word Pre/Post-Treatment    Pain Location - Side  Right  -     Pain Location  hip  -     Pain Intervention(s)  Repositioned  -     Row Name 10/12/20 0952          Bed Mobility    Bed Mobility  supine-sit  -     Supine-Sit [place] (Bed Mobility)  moderate assist (50% patient effort);verbal cues  -     Bed Mobility, Safety Issues  decreased use of legs for bridging/pushing  -     Assistive Device (Bed Mobility)  head of bed elevated  -     Row Name 10/12/20 0952          Transfers    Transfers  sit-stand transfer;stand-sit transfer  -     Maintains Weight-bearing Status (Transfers)  able to maintain  -     Sit-Stand [place] (Transfers)  moderate assist (50% patient effort);2 person assist;verbal cues  -     Stand-Sit [place] (Transfers)  minimum assist (75% patient effort);verbal cues  -     Row Name 10/12/20 0952          Sit-Stand Transfer    Assistive Device (Sit-Stand Transfers)  walker, front-wheeled  -     Row Name 10/12/20 0952          Stand-Sit Transfer    Assistive Device (Stand-Sit Transfers)  walker, front-wheeled  -     Row Name 10/12/20 0952          Gait/Stairs (Locomotion)     [place] Level (Gait)  moderate assist (50% patient effort);2 person assist  -     Assistive Device (Gait)  walker, front-wheeled  -     Distance in Feet (Gait)  4-5 steps bed to chair  -     Deviations/Abnormal Patterns (Gait)  antalgic;gait speed decreased  -     Bilateral Gait Deviations  forward flexed posture  -     Comment (Gait/Stairs)  Patient able to maintain partial WB right LE today.  Generalized tremor/shaking impairing standing balance and mobility.  Patient with signficant difficulty maintaining balance and advancing feet forward for steps.  Attempted to walk bed to chair, however unable to complete distance - bed moved and chair brought to patient for safe transfer stand to sit.  -     Row Name 10/12/20 0952          Motor Skills    Therapeutic Exercise  -- right LE: Ankle pumps, LAQ x 10 each  -     Row Name             Wound 10/09/20 1700 Right lateral hip Incision    Wound - Properties Group Placement Date: 10/09/20  -ME Placement Time: 1700  -ME Side: Right  -ME Orientation: lateral  -ME Location: hip  -ME, hip and leg incisions  Primary Wound Type: Incision  -ME Additional Comments: prineo, telfa, tegaderms  -ME    Retired Wound - Properties Group Date first assessed: 10/09/20  -ME Time first assessed: 1700  -ME Side: Right  -ME Location: hip  -ME, hip and leg incisions  Primary Wound Type: Incision  -ME Additional Comments: prineo, telfa, tegaderms  -ME    Row Name 10/12/20 0952          Plan of Care Review    Plan of Care Reviewed With  patient  -     Progress  no change  -     Outcome Summary  PT:  Patient required mod A x 2 for bed mobility and for sit to stand transfer.  He was able to take 4-5 small steps with rolling walker, maintaining partial weightbearing right LE.  Mobility limited by pain and shaking/tremors.  Patient would benefit from SNF for continued rehab prior to d/c home.  -     Row Name 10/12/20 0952          Positioning and Restraints    Pre-Treatment  Position  in bed  -     Post Treatment Position  chair  -     In Chair  reclined;call light within reach;encouraged to call for assist  -       User Key  (r) = Recorded By, (t) = Taken By, (c) = Cosigned By    Initials Name Provider Type     Graciela Oliveira, PT Physical Therapist    Izabella Kebede RN Registered Nurse        Physical Therapy Education                 Title: PT OT SLP Therapies (In Progress)     Topic: Physical Therapy (In Progress)     Point: Mobility training (Done)     Learning Progress Summary           Patient Acceptance, E,TB, VU by  at 10/12/2020 1201    Acceptance, E, VU by  at 10/10/2020 1256    Comment: Education on functional mobility and gait with FWW, PWB right leg.                   Point: Home exercise program (Not Started)     Learner Progress:  Not documented in this visit.          Point: Body mechanics (Done)     Learning Progress Summary           Patient Acceptance, E, VU by  at 10/10/2020 1256    Comment: Education on functional mobility and gait with FWW, PWB right leg.                   Point: Precautions (Done)     Learning Progress Summary           Patient Acceptance, E,TB, VU by  at 10/12/2020 1201    Acceptance, E, VU by  at 10/10/2020 1256    Comment: Education on functional mobility and gait with FWW, PWB right leg.                               User Key     Initials Effective Dates Name Provider Type Discipline     06/08/18 -  Graciela Oliveira, PT Physical Therapist PT    LINDSAY 08/09/20 -  Nanette Rose, PT Physical Therapist PT              PT Recommendation and Plan     Plan of Care Reviewed With: patient  Progress: no change  Outcome Summary: PT:  Patient required mod A x 2 for bed mobility and for sit to stand transfer.  He was able to take 4-5 small steps with rolling walker, maintaining partial weightbearing right LE.  Mobility limited by pain and shaking/tremors.  Patient would benefit from SNF for continued rehab prior to d/c  home.  Outcome Measures     Row Name 10/12/20 1200 10/10/20 1000 10/10/20 0927       How much help from another person do you currently need...    Turning from your back to your side while in flat bed without using bedrails?  2  -LH  --  2  -LN    Moving from lying on back to sitting on the side of a flat bed without bedrails?  2  -LH  --  2  -LN    Moving to and from a bed to a chair (including a wheelchair)?  2  -LH  --  2  -LN    Standing up from a chair using your arms (e.g., wheelchair, bedside chair)?  2  -LH  --  2  -LN    Climbing 3-5 steps with a railing?  1  -LH  --  1  -LN    To walk in hospital room?  2  -LH  --  3  -LN    AM-PAC 6 Clicks Score (PT)  11  -LH  --  12  -LN       How much help from another is currently needed...    Putting on and taking off regular lower body clothing?  --  2  -EN  --    Bathing (including washing, rinsing, and drying)  --  2  -EN  --    Toileting (which includes using toilet bed pan or urinal)  --  2  -EN  --    Putting on and taking off regular upper body clothing  --  4  -EN  --    Taking care of personal grooming (such as brushing teeth)  --  4  -EN  --    Eating meals  --  4  -EN  --    AM-PAC 6 Clicks Score (OT)  --  18  -EN  --       Functional Assessment    Outcome Measure Options  AM-PAC 6 Clicks Basic Mobility (PT)  -  AM-PAC 6 Clicks Daily Activity (OT)  -EN  AM-PAC 6 Clicks Basic Mobility (PT)  -      User Key  (r) = Recorded By, (t) = Taken By, (c) = Cosigned By    Initials Name Provider Type     Graciela Oliveira, PT Physical Therapist    Rosalba Meraz, OTR Occupational Therapist    Nanette Hughes, PT Physical Therapist           Time Calculation:   PT Charges     Row Name 10/12/20 1202             Time Calculation    Start Time  0952  -      Stop Time  1010  -      Time Calculation (min)  18 min  -        User Key  (r) = Recorded By, (t) = Taken By, (c) = Cosigned By    Initials Name Provider Type     Graciela Oliveira,  PT Physical Therapist        Therapy Charges for Today     Code Description Service Date Service Provider Modifiers Qty    63015931829 HC GAIT TRAINING EA 15 MIN 10/12/2020 Graciela Oliveira, PT GP 1          PT G-Codes  Outcome Measure Options: AM-PAC 6 Clicks Basic Mobility (PT)  AM-PAC 6 Clicks Score (PT): 11  AM-PAC 6 Clicks Score (OT): 18    Graciela Oliveira, ADY  10/12/2020

## 2020-10-12 NOTE — THERAPY TREATMENT NOTE
Acute Care - Physical Therapy Treatment Note   [facility]     Patient Name: Simone Boykin  : 1970  MRN: [id number]  Today's Date: 10/12/2020   Onset of Illness/Injury or Date of Surgery: 10/08/20       PT Assessment (last 12 hours)      PT Evaluation and Treatment     Row Name 10/12/20 1338 10/12/20 0952       Physical Therapy Time and Intention    Subjective Information  complains of;pain  -  complains of;pain  -LH    Document Type  therapy note (daily note)  -  therapy note (daily note)  -    Mode of Treatment  physical therapy  -  physical therapy  -    Patient Effort  good  -  good  -    Symptoms Noted During/After Treatment  increased pain  -  increased pain  -    Row Name 10/12/20 1338          General Information    General Observations of Patient  Patient reclined in chair, agrees to therapy,  Requests to return to bed.  -     Row Name 10/12/20 1338          Cognition    Personal Safety Interventions  gait belt;nonskid shoes/slippers when out of bed;supervised activity  -     Row Name 10/12/20 1338 10/12/20 0952       Pain Scale: Numbers Pre/Post-Treatment    Pretreatment Pain Rating  5/10  -  7/10  -    Posttreatment Pain Rating  5/10  -  7/10  -    Row Name 10/12/20 1338 10/12/20 0952       Pain Scale: Word Pre/Post-Treatment    Pain Location - Side  Right  -LH  Right  -LH    Pain Location  hip  -LH  hip  -LH    Pain Intervention(s)  Repositioned  -  Repositioned  -    Row Name 10/12/20 1338 10/12/20 0952       Bed Mobility    Bed Mobility  sit-supine  -  supine-sit  -    Supine-Sit [place] (Bed Mobility)  verbal cues;moderate assist (50% patient effort)  -  moderate assist (50% patient effort);verbal cues  -    Bed Mobility, Safety Issues  decreased use of legs for bridging/pushing  -  decreased use of legs for bridging/pushing  -    Assistive Device (Bed Mobility)  bed rails  -  head of bed elevated  -    Row Name 10/12/20 1338 10/12/20  0952       Transfers    Transfers  --  sit-stand transfer;stand-sit transfer  -    Maintains Weight-bearing Status (Transfers)  able to maintain  -  able to maintain  -    Comment (Transfers)  verbal cues for hand placement  -  --    Sit-Stand [place] (Transfers)  verbal cues;moderate assist (50% patient effort);2 person assist  -  moderate assist (50% patient effort);2 person assist;verbal cues  -    Stand-Sit [place] (Transfers)  verbal cues;minimum assist (75% patient effort)  -  minimum assist (75% patient effort);verbal cues  -    Row Name 10/12/20 1338 10/12/20 0952       Sit-Stand Transfer    Assistive Device (Sit-Stand Transfers)  walker, front-wheeled  -  walker, front-wheeled  -    Row Name 10/12/20 1338 10/12/20 0952       Stand-Sit Transfer    Assistive Device (Stand-Sit Transfers)  walker, front-wheeled  -  walker, front-wheeled  -    Row Name 10/12/20 1338 10/12/20 0952       Gait/Stairs (Locomotion)    [place] Level (Gait)  minimum assist (75% patient effort);moderate assist (50% patient effort);2 person assist;verbal cues  -  moderate assist (50% patient effort);2 person assist  -    Assistive Device (Gait)  walker, front-wheeled  -  walker, front-wheeled  -    Distance in Feet (Gait)  4-5 steps forward, attempted to take steps backward to bed, but unable.  Therapist moved bed up behind patient to allow him to safely sit.  -  4-5 steps bed to chair  -    Deviations/Abnormal Patterns (Gait)  --  antalgic;gait speed decreased  -    Bilateral Gait Deviations  --  forward flexed posture  -    Comment (Gait/Stairs)  SHaking/tremors continue to interfere with mobility,  Patient c/o high levels of painwith mobility, difficulty utilizing upper body/walker to assist with mobility and weightbearing restrictions.  Unable to progress ambulation this afternoon.  -  Patient able to maintain partial WB right LE today.  Generalized tremor/shaking impairing  standing balance and mobility.  Patient with signficant difficulty maintaining balance and advancing feet forward for steps.  Attempted to walk bed to chair, however unable to complete distance - bed moved and chair brought to patient for safe transfer stand to sit.  -    Row Name 10/12/20 1338 10/12/20 0952       Motor Skills    Therapeutic Exercise  -- 10 reps each ankle pumps, LAQ  -LH  -- right LE: Ankle pumps, LAQ x 10 each  -    Row Name             Wound 10/09/20 1700 Right lateral hip Incision    Wound - Properties Group Placement Date: 10/09/20  -ME Placement Time: 1700  -ME Side: Right  -ME Orientation: lateral  -ME Location: hip  -ME, hip and leg incisions  Primary Wound Type: Incision  -ME Additional Comments: prineo, telfa, tegaderms  -ME    Retired Wound - Properties Group Date first assessed: 10/09/20  -ME Time first assessed: 1700  -ME Side: Right  -ME Location: hip  -ME, hip and leg incisions  Primary Wound Type: Incision  -ME Additional Comments: prineo, telfa, tegaderms  -ME    Row Name 10/12/20 1338 10/12/20 0952       Plan of Care Review    Plan of Care Reviewed With  patient  -  patient  -    Progress  --  no change  -    Outcome Summary  PT - patient continues to require mod A x 2 for sit to stand transfers and was unable to progress ambulation this afternoon, taking 4-5 small steps forward.  Mobility limited by pain and shaking/tremors.  Continue to recommend SNF at discharge.  -  PT:  Patient required mod A x 2 for bed mobility and for sit to stand transfer.  He was able to take 4-5 small steps with rolling walker, maintaining partial weightbearing right LE.  Mobility limited by pain and shaking/tremors.  Patient would benefit from SNF for continued rehab prior to d/c home.  -    Row Name 10/12/20 1338 10/12/20 0952       Positioning and Restraints    Pre-Treatment Position  sitting in chair/recliner  -  in bed  -    Post Treatment Position  bed  -  chair  -    In Bed   supine;call light within reach;encouraged to call for assist  -LH  --    In Chair  --  reclined;call light within reach;encouraged to call for assist  -LH      User Key  (r) = Recorded By, (t) = Taken By, (c) = Cosigned By    Initials Name Provider Type     Graciela Oliveira, PT Physical Therapist    Izabella Kebede RN Registered Nurse        Physical Therapy Education                 Title: PT OT SLP Therapies (In Progress)     Topic: Physical Therapy (In Progress)     Point: Mobility training (Done)     Learning Progress Summary           Patient Acceptance, E,TB, VU by  at 10/12/2020 1540    Acceptance, E,TB, VU by  at 10/12/2020 1201    Acceptance, E, VU by  at 10/10/2020 1256    Comment: Education on functional mobility and gait with FWW, PWB right leg.                   Point: Home exercise program (Not Started)     Learner Progress:  Not documented in this visit.          Point: Body mechanics (Done)     Learning Progress Summary           Patient Acceptance, E, VU by  at 10/10/2020 1256    Comment: Education on functional mobility and gait with FWW, PWB right leg.                   Point: Precautions (Done)     Learning Progress Summary           Patient Acceptance, E,TB, VU by  at 10/12/2020 1540    Acceptance, E,TB, VU by  at 10/12/2020 1201    Acceptance, E, VU by  at 10/10/2020 1256    Comment: Education on functional mobility and gait with FWW, PWB right leg.                               User Key     Initials Effective Dates Name Provider Type formerly [facility] 06/08/18 -  Graciela Oliveira, PT Physical Therapist PT    LINDSAY 08/09/20 -  Nanette Rose, PT Physical Therapist PT              PT Recommendation and Plan     Plan of Care Reviewed With: patient  Progress: no change  Outcome Summary: PT - patient continues to require mod A x 2 for sit to stand transfers and was unable to progress ambulation this afternoon, taking 4-5 small steps forward.  Mobility limited by pain and  shaking/tremors.  Continue to recommend SNF at discharge.  Outcome Measures     Row Name 10/12/20 1500 10/12/20 1400 10/12/20 1200       How much help from another person do you currently need...    Turning from your back to your side while in flat bed without using bedrails?  2  -LH  --  2  -LH    Moving from lying on back to sitting on the side of a flat bed without bedrails?  2  -LH  --  2  -LH    Moving to and from a bed to a chair (including a wheelchair)?  2  -LH  --  2  -LH    Standing up from a chair using your arms (e.g., wheelchair, bedside chair)?  2  -LH  --  2  -LH    Climbing 3-5 steps with a railing?  1  -LH  --  1  -LH    To walk in hospital room?  2  -LH  --  2  -LH    AM-PAC 6 Clicks Score (PT)  11  -LH  --  11  -LH       How much help from another is currently needed...    Putting on and taking off regular lower body clothing?  --  2  -EN  --    Bathing (including washing, rinsing, and drying)  --  2  -EN  --    Toileting (which includes using toilet bed pan or urinal)  --  2  -EN  --    Putting on and taking off regular upper body clothing  --  4  -EN  --    Taking care of personal grooming (such as brushing teeth)  --  4  -EN  --    Eating meals  --  4  -EN  --    AM-PAC 6 Clicks Score (OT)  --  18  -EN  --       Functional Assessment    Outcome Measure Options  AM-PAC 6 Clicks Basic Mobility (PT)  [facility]  --  AM-[facility] 6 Clicks Basic Mobility (PT)  -    Row Name 10/10/20 1000 10/10/20 0927          How much help from another person do you currently need...    Turning from your back to your side while in flat bed without using bedrails?  --  2  -LN     Moving from lying on back to sitting on the side of a flat bed without bedrails?  --  2  -LN     Moving to and from a bed to a chair (including a wheelchair)?  --  2  -LN     Standing up from a chair using your arms (e.g., wheelchair, bedside chair)?  --  2  -LN     Climbing 3-5 steps with a railing?  --  1  -LN     To walk in hospital room?  --  3   -LN     AM-PAC 6 Clicks Score (PT)  --  12  -LN        How much help from another is currently needed...    Putting on and taking off regular lower body clothing?  2  -EN  --     Bathing (including washing, rinsing, and drying)  2  -EN  --     Toileting (which includes using toilet bed pan or urinal)  2  -EN  --     Putting on and taking off regular upper body clothing  4  -EN  --     Taking care of personal grooming (such as brushing teeth)  4  -EN  --     Eating meals  4  -EN  --     AM-PAC 6 Clicks Score (OT)  18  -EN  --        Functional Assessment    Outcome Measure Options  AM-PAC 6 Clicks Daily Activity (OT)  -EN  AM-PAC 6 Clicks Basic Mobility (PT)  -LN       User Key  (r) = Recorded By, (t) = Taken By, (c) = Cosigned By    Initials Name Provider Type     Graciela Oliveira, PT Physical Therapist    EN Rosalba Voss, OTR Occupational Therapist    Nanette Hughes, PT Physical Therapist           Time Calculation:   PT Charges     Row Name 10/12/20 1541 10/12/20 1202          Time Calculation    Start Time  1338  -  0952  -     Stop Time  1353  -  1010  -     Time Calculation (min)  15 min  -  18 min  -     PT Received On  10/12/20  -  --     PT - Next Appointment  10/13/20  -  --       User Key  (r) = Recorded By, (t) = Taken By, (c) = Cosigned By    Initials Name Provider Type     Graciela Oliveira, PT Physical Therapist        Therapy Charges for Today     Code Description Service Date Service Provider Modifiers Qty    90175205819 HC GAIT TRAINING EA 15 MIN 10/12/2020 Graciela Oliveira, PT GP 1    59038961838 HC GAIT TRAINING EA 15 MIN 10/12/2020 Graciela Oliveira, PT GP 1          PT G-Codes  Outcome Measure Options: AM-PAC 6 Clicks Basic Mobility (PT)  AM-PAC 6 Clicks Score (PT): 11  AM-PAC 6 Clicks Score (OT): 18    Graciela Oliveira PT  10/12/2020

## 2020-10-13 LAB
ANION GAP SERPL CALCULATED.3IONS-SCNC: 7.2 MMOL/L (ref 5–15)
BUN SERPL-MCNC: 8 MG/DL (ref 6–20)
BUN/CREAT SERPL: 10.7 (ref 7–25)
CALCIUM SPEC-SCNC: 8.3 MG/DL (ref 8.6–10.5)
CHLORIDE SERPL-SCNC: 104 MMOL/L (ref 98–107)
CO2 SERPL-SCNC: 24.8 MMOL/L (ref 22–29)
CREAT SERPL-MCNC: 0.75 MG/DL (ref 0.76–1.27)
GFR SERPL CREATININE-BSD FRML MDRD: 110 ML/MIN/1.73
GLUCOSE SERPL-MCNC: 121 MG/DL (ref 65–99)
HCT VFR BLD AUTO: 27.3 % (ref 37.5–51)
HGB BLD-MCNC: 8.6 G/DL (ref 13–17.7)
POTASSIUM SERPL-SCNC: 3.6 MMOL/L (ref 3.5–5.2)
SODIUM SERPL-SCNC: 136 MMOL/L (ref 136–145)

## 2020-10-13 PROCEDURE — 85014 HEMATOCRIT: CPT | Performed by: INTERNAL MEDICINE

## 2020-10-13 PROCEDURE — 80048 BASIC METABOLIC PNL TOTAL CA: CPT | Performed by: INTERNAL MEDICINE

## 2020-10-13 PROCEDURE — 85018 HEMOGLOBIN: CPT | Performed by: INTERNAL MEDICINE

## 2020-10-13 PROCEDURE — 99232 SBSQ HOSP IP/OBS MODERATE 35: CPT | Performed by: INTERNAL MEDICINE

## 2020-10-13 RX ORDER — PANTOPRAZOLE SODIUM 40 MG/1
40 TABLET, DELAYED RELEASE ORAL
Status: DISCONTINUED | OUTPATIENT
Start: 2020-10-13 | End: 2020-10-19 | Stop reason: HOSPADM

## 2020-10-13 RX ADMIN — CHLORDIAZEPOXIDE HYDROCHLORIDE 10 MG: 5 CAPSULE ORAL at 19:15

## 2020-10-13 RX ADMIN — CHLORDIAZEPOXIDE HYDROCHLORIDE 10 MG: 5 CAPSULE ORAL at 06:14

## 2020-10-13 RX ADMIN — OXYCODONE HYDROCHLORIDE AND ACETAMINOPHEN 2 TABLET: 7.5; 325 TABLET ORAL at 16:05

## 2020-10-13 RX ADMIN — OXYCODONE HYDROCHLORIDE AND ACETAMINOPHEN 2 TABLET: 7.5; 325 TABLET ORAL at 06:14

## 2020-10-13 RX ADMIN — SODIUM CHLORIDE, PRESERVATIVE FREE 10 ML: 5 INJECTION INTRAVENOUS at 20:09

## 2020-10-13 RX ADMIN — PANTOPRAZOLE SODIUM 40 MG: 40 TABLET, DELAYED RELEASE ORAL at 06:14

## 2020-10-13 RX ADMIN — SODIUM CHLORIDE, PRESERVATIVE FREE 10 ML: 5 INJECTION INTRAVENOUS at 10:29

## 2020-10-13 RX ADMIN — VANCOMYCIN HYDROCHLORIDE 125 MG: 125 CAPSULE ORAL at 10:28

## 2020-10-13 RX ADMIN — OXYCODONE HYDROCHLORIDE AND ACETAMINOPHEN 2 TABLET: 7.5; 325 TABLET ORAL at 20:07

## 2020-10-13 RX ADMIN — MAGNESIUM HYDROXIDE 10 ML: 2400 SUSPENSION ORAL at 10:35

## 2020-10-13 RX ADMIN — OXYCODONE HYDROCHLORIDE AND ACETAMINOPHEN 2 TABLET: 7.5; 325 TABLET ORAL at 10:35

## 2020-10-13 RX ADMIN — FOLIC ACID 1 MG: 1 TABLET ORAL at 10:29

## 2020-10-13 RX ADMIN — APIXABAN 2.5 MG: 2.5 TABLET, FILM COATED ORAL at 20:07

## 2020-10-13 RX ADMIN — Medication 400 MG: at 10:28

## 2020-10-13 RX ADMIN — VANCOMYCIN HYDROCHLORIDE 125 MG: 125 CAPSULE ORAL at 19:14

## 2020-10-13 RX ADMIN — MULTIPLE VITAMINS W/ MINERALS TAB 1 TABLET: TAB at 10:29

## 2020-10-13 NOTE — NURSING NOTE
Continued Stay Note   [facility]     Patient Name: Simone Boykin  MRN: [id number]  Today's Date: 10/13/2020    Admit Date: 10/8/2020    Discharge Plan     Row Name 10/13/20 1898       Plan    Plan Comments  Per Bernarda at [facility] will look at him tomorrow and let CM know if they can accept. CM will continue to follow.    Row Name 10/13/20 2076       Plan    Plan Comments  Recieved call from Carroll at [facility] and they will not be able to take patient as he has preseumptive Medicaid and it will not pay for the rehab.  Mr Boykin's mother came in and wants to pay cash for him to go to rehab at [facility] rehab, TCU.  Spoke with Connie and she will get back with me.  Will continue to follow        Discharge Codes    No documentation.             Jessy Jeter RN

## 2020-10-13 NOTE — NURSING NOTE
Continued Stay Note   [facility]     Patient Name: Simone Boykin  MRN: [id number]  Today's Date: 10/13/2020    Admit Date: 10/8/2020    Discharge Plan     Row Name 10/13/20 1047       Plan    Plan  Skilled nursing facility for rehab    Plan Comments  Patient will need rehab and he has requested either the rehab here at [facility] or [facility].  [facility] rehab does not accept Medicaid.  So I called a referral to Carroll at [facility].  He will eval and call me back. Will continue to follow        Discharge Codes    No documentation.             Luiza Marr RN

## 2020-10-13 NOTE — NURSING NOTE
Continued Stay Note   [facility]     Patient Name: Simone Boykin  MRN: [id number]  Today's Date: 10/13/2020    Admit Date: 10/8/2020    Discharge Plan     Row Name 10/13/20 1523       Plan    Plan Comments  Received call from Carroll at [facility] and they will not be able to take patient as he has presumptive Medicaid and it will not pay for the rehab.  Mr Boykin's mother came in and wants to pay cash for him to go to rehab at [facility] rehab, TCU.  Spoke with Connie and she will get back with me.  Will continue to follow        Discharge Codes    No documentation.             Luiza Marr RN

## 2020-10-13 NOTE — SIGNIFICANT NOTE
10/13/20 1100   OTHER   Discipline occupational therapist   Rehab Time/Intention   Session Not Performed patient/family declined treatment

## 2020-10-13 NOTE — PROGRESS NOTES
POD# 4 s/p right hip nail     Subjective: Simone Boykin resting in bed this am. Pain fairly well controlled. Afebrile overnight, denies residual numbness/tingling right lower extremity     Objective:  Vitals:    10/12/20 1958 10/13/20 0014 10/13/20 0613 10/13/20 1148   BP: 99/61 141/82 129/81 111/62   BP Location: Right arm Right arm Right arm Left arm   Patient Position: Lying Lying Lying Lying   Pulse: 94 106 102 95   Resp: 18 20 20 16   Temp: 98.2 °F (36.8 °C) 98 °F (36.7 °C) 98.2 °F (36.8 °C) 97.4 °F (36.3 °C)   TempSrc: Oral Oral Oral Oral   SpO2: 96% 95% 98% 96%   Weight:       Height:           Results from last 7 days   Lab Units 10/13/20  0758 10/12/20  0441 10/11/20  0424  10/09/20  0958 10/08/20  1458   WBC 10*3/mm3  --   --  9.00  --  5.17 5.25   HEMOGLOBIN g/dL 8.6* 8.5* 6.5*   < > 8.4* 8.7*   HEMATOCRIT % 27.3* 27.2* 20.9*   < > 26.4* 27.5*   PLATELETS 10*3/mm3  --   --  145  --  118* 114*    < > = values in this interval not displayed.       Results from last 7 days   Lab Units 10/13/20  0758 10/11/20  0424 10/10/20  0538   SODIUM mmol/L 136 137 134*   POTASSIUM mmol/L 3.6 4.1 5.0   CHLORIDE mmol/L 104 106 105   CO2 mmol/L 24.8 22.2 17.1*   BUN mg/dL 8 11 10   CREATININE mg/dL 0.75* 0.82 0.83   GLUCOSE mg/dL 121* 137* 195*   CALCIUM mg/dL 8.3* 8.0* 7.9*       Exam:    Right lower extremity examined  Positive sensation light touch all distributions right lower extremity  Negative calf tenderness, negative Homans sign  Flex extend all digits right foot, dorsiflexion plantarflexion intact right ankle  2+ dorsalis pedis pulse  Moderate swelling ankle patient reports at baseline  Brisk cap refill all digits right foot    Impression: s/p right hip nail    Plan:  1. PT/OT- ambulate, partial weight-bearing right lower extremity  2. Pain control- percocet  3. DVT prophylaxis-ambulate, SCDs, Eliquis discontinued on 10/11/2020  4. Wound care- postop dressing removed Dermabond to remain intact until  follow-up in office in 2 weeks with x-ray imaging  5. Disposition-short-term rehab disposition per hospitalist

## 2020-10-13 NOTE — PROGRESS NOTES
"SERVICE: [facility] HOSPITALIST    CHIEF COMPLAINT: Left arm shaking    SUBJECTIVE:    Patient examined on rounds this a.m.    Patient agreeable to physical rehab, as he is aware of how unsafe he would be at home with his current acute debility.  States that he has little upper body strength, partially due to a untreated left shoulder rotator cuff tear.    Pain is well controlled with current medications.    Patient denies any fever, chills, nausea vomiting or diarrhea.    OBJECTIVE:    BP 99/61 (BP Location: Right arm, Patient Position: Lying)   Pulse 94   Temp 98.2 °F (36.8 °C) (Oral)   Resp 18   Ht 182.9 cm (72\")   Wt 113 kg (250 lb)   SpO2 96%   BMI 33.91 kg/m²     MEDS/LABS REVIEWED AND ORDERED    chlordiazePOXIDE, 10 mg, Oral, Q6H  famotidine, 20 mg, Intravenous, Q12H  folic acid, 1 mg, Oral, Daily  Magnesium Oxide, 400 mg, Oral, Daily  multivitamin with minerals, 1 tablet, Oral, Daily  sodium chloride, 10 mL, Intravenous, Q12H  vancomycin, 125 mg, Oral, 4x Daily        Physical Exam  Vitals signs and nursing note reviewed.   Constitutional:       General: He is not in acute distress.     Appearance: He is normal weight. He is not ill-appearing, toxic-appearing or diaphoretic.   HENT:      Mouth/Throat:      Mouth: Mucous membranes are moist.   Eyes:      Pupils: Pupils are equal, round, and reactive to light.   Cardiovascular:      Rate and Rhythm: Regular rhythm. Tachycardia present.      Heart sounds: No murmur.   Pulmonary:      Effort: Pulmonary effort is normal. No respiratory distress.      Breath sounds: No wheezing, rhonchi or rales.   Abdominal:      General: Bowel sounds are normal.      Palpations: Abdomen is soft.      Tenderness: There is no abdominal tenderness. There is no guarding or rebound.   Musculoskeletal:      Comments: Bilateral trace pitting edema of the lower extremities   Skin:     General: Skin is warm and dry.      Coloration: Skin is not jaundiced.   "   Neurological:      General: No focal deficit present.      Mental Status: He is alert and oriented to person, place, and time.      Cranial Nerves: No cranial nerve deficit.      Comments: Left arm tremor present both at rest and with action, lessened with distraction  No asterixis noted with arms extended   Psychiatric:         Mood and Affect: Mood normal.         Behavior: Behavior normal.         LAB/DIAGNOSTICS:    Lab Results (last 24 hours)     Procedure Component Value Units Date/Time    Hemoglobin & Hematocrit, Blood [[id number]]  (Abnormal) Collected: 10/12/20 0441    Specimen: Blood Updated: 10/12/20 0526     Hemoglobin 8.5 g/dL      Hematocrit 27.2 %            No radiology results for the last day      ASSESSMENT/PLAN:      Etoh WD w/ recent seizure  -Patient CIWA scores have remained 4, patient was kept inpatient today for further monitoring to make sure that patient was not going into further withdrawals  -CIWA scores have remained for mostly due to his tremor, but the patient states is baseline  -Seizure happened just prior to recent admission at [facility], their neurologist did not discharge him with antiseizure medication  -If he does not acutely worsen overnight patient will be medically stable for physical rehab tomorrow  -He is currently on a scheduled Librium wean, and has not gotten any PRN's for Ativan    Right intertrochanteric comminuted femur fracture  -Changed to day 3 status post right hip IM nailing  -Ortho is following per their recs    Acute blood loss anemia on chronic macrocytic anemia  -Got 2 units of blood yesterday for hemoglobin of 6.5 repeat today is 8.5 recheck in a.m.  -GI soft today, no signs of a significant GI source of bleed, will follow up outpatient in 8 weeks    C. difficile colitis  -Found that recent admission at [facility], continue oral vancomycin    Reflux esophagitis  -Patient is currently on famotidine, but GI recommended daily PPI, will change      Plan for patient  to be medically stable for discharge tomorrow, will likely need to start pre-CERT

## 2020-10-13 NOTE — SIGNIFICANT NOTE
"   10/13/20 1144   OTHER   Discipline physical therapist   Rehab Time/Intention   Session Not Performed patient/family declined treatment  (attempted to see for therapy this AM.  Patient refuses despite encouragement/education.  Offered to return in afternoon, pt declines stating \"don't even bother, I am just taking a day to rest today.\"  will follow up in AM.)     "

## 2020-10-14 PROCEDURE — 97110 THERAPEUTIC EXERCISES: CPT

## 2020-10-14 PROCEDURE — 99232 SBSQ HOSP IP/OBS MODERATE 35: CPT | Performed by: INTERNAL MEDICINE

## 2020-10-14 PROCEDURE — 97535 SELF CARE MNGMENT TRAINING: CPT

## 2020-10-14 RX ADMIN — FOLIC ACID 1 MG: 1 TABLET ORAL at 09:41

## 2020-10-14 RX ADMIN — OXYCODONE HYDROCHLORIDE AND ACETAMINOPHEN 2 TABLET: 7.5; 325 TABLET ORAL at 13:12

## 2020-10-14 RX ADMIN — OXYCODONE HYDROCHLORIDE AND ACETAMINOPHEN 2 TABLET: 7.5; 325 TABLET ORAL at 06:19

## 2020-10-14 RX ADMIN — APIXABAN 2.5 MG: 2.5 TABLET, FILM COATED ORAL at 20:21

## 2020-10-14 RX ADMIN — PANTOPRAZOLE SODIUM 40 MG: 40 TABLET, DELAYED RELEASE ORAL at 06:19

## 2020-10-14 RX ADMIN — CHLORDIAZEPOXIDE HYDROCHLORIDE 10 MG: 5 CAPSULE ORAL at 23:20

## 2020-10-14 RX ADMIN — SODIUM CHLORIDE, PRESERVATIVE FREE 10 ML: 5 INJECTION INTRAVENOUS at 09:41

## 2020-10-14 RX ADMIN — SODIUM CHLORIDE, PRESERVATIVE FREE 10 ML: 5 INJECTION INTRAVENOUS at 20:22

## 2020-10-14 RX ADMIN — VANCOMYCIN HYDROCHLORIDE 125 MG: 125 CAPSULE ORAL at 00:06

## 2020-10-14 RX ADMIN — VANCOMYCIN HYDROCHLORIDE 125 MG: 125 CAPSULE ORAL at 09:41

## 2020-10-14 RX ADMIN — Medication 400 MG: at 09:41

## 2020-10-14 RX ADMIN — OXYCODONE HYDROCHLORIDE AND ACETAMINOPHEN 2 TABLET: 7.5; 325 TABLET ORAL at 17:45

## 2020-10-14 RX ADMIN — VANCOMYCIN HYDROCHLORIDE 125 MG: 125 CAPSULE ORAL at 12:59

## 2020-10-14 RX ADMIN — OXYCODONE HYDROCHLORIDE AND ACETAMINOPHEN 2 TABLET: 7.5; 325 TABLET ORAL at 00:07

## 2020-10-14 RX ADMIN — VANCOMYCIN HYDROCHLORIDE 125 MG: 125 CAPSULE ORAL at 17:45

## 2020-10-14 RX ADMIN — OXYCODONE HYDROCHLORIDE AND ACETAMINOPHEN 2 TABLET: 7.5; 325 TABLET ORAL at 23:20

## 2020-10-14 RX ADMIN — CHLORDIAZEPOXIDE HYDROCHLORIDE 10 MG: 5 CAPSULE ORAL at 12:59

## 2020-10-14 RX ADMIN — CHLORDIAZEPOXIDE HYDROCHLORIDE 10 MG: 5 CAPSULE ORAL at 06:19

## 2020-10-14 RX ADMIN — APIXABAN 2.5 MG: 2.5 TABLET, FILM COATED ORAL at 09:41

## 2020-10-14 RX ADMIN — MULTIPLE VITAMINS W/ MINERALS TAB 1 TABLET: TAB at 09:41

## 2020-10-14 RX ADMIN — CHLORDIAZEPOXIDE HYDROCHLORIDE 10 MG: 5 CAPSULE ORAL at 00:06

## 2020-10-14 RX ADMIN — CHLORDIAZEPOXIDE HYDROCHLORIDE 10 MG: 5 CAPSULE ORAL at 17:45

## 2020-10-14 NOTE — PLAN OF CARE
Goal Outcome Evaluation:  Plan of Care Reviewed With: patient  Progress: no change  Outcome Summary: VSS, continues on tele running sinus rhythm, po pain medication to controll pain, awaiting to see if TCU will accept

## 2020-10-14 NOTE — NURSING NOTE
Continued Stay Note  SUSANNA Smith     Patient Name: Simone Boykin  MRN: [id number]  Today's Date: 10/14/2020    Admit Date: 10/8/2020    Discharge Plan     Row Name 10/14/20 1306       Plan    Plan Comments  Referral given to Migdalia torres [facility] for PT at home.  Will continue to follow    Row Name 10/14/20 8460       Plan    Plan Comments  Spoke with Medicaid office and Mr Boykin has home health coverage and no coverage for rehab.  Denisse STEPHENS and I went in to evaluate Mr Boykin's ability to get up and he is very slow to move and it requires great effort on his part to get up out of the chair.  He states he has 5 steps at home to get in the door.  He is very tremulous and unsteady on his feet.  Provided him with a new rolling walker.  He was able to walk about 5 feet and then became weak and backed up and sat down.  Plan is home with home health.        Discharge Codes    No documentation.             Luiza Marr RN

## 2020-10-14 NOTE — SIGNIFICANT NOTE
"   10/14/20 0914   OTHER   Discipline physical therapist   Rehab Time/Intention   Session Not Performed patient/family declined treatment  (PT: Patient refuses PT this morning. States, \"come back this afternoon, I don't know what is going on\" in regards to discharge plans. Discussed at length  benefit of participation with therapy, patient continues to refuse therapy this morning. Will check back after lunch today.)     "

## 2020-10-14 NOTE — PROGRESS NOTES
Adult Nutrition  Assessment/PES    Patient Name:  Simone Boykin  YOB: 1970  MRN: [id number]  Admit Date:  10/8/2020    Assessment Date:  10/14/2020    Comments:  Good po intake. Will cont to follow and monitor.  Noted folic acid and MVT. Consider thiamine with ETOH hx.     Reason for Assessment     Row Name 10/14/20 1240          Reason for Assessment    Reason For Assessment  per organizational policy LOS     Diagnosis  trauma;infection/sepsis s/p fall femur fx, cdiff, anemia, esophagitis , hx ETOH         Nutrition/Diet History     Row Name 10/14/20 1241          Nutrition/Diet History    Typical Food/Fluid Intake  Pt getting help from CNA at visit earlier.         Anthropometrics     Row Name 10/14/20 1242          Anthropometrics    Current Weight Method  -- 250#- stated noted        Body Mass Index (BMI)    BMI Assessment  BMI 30-34.9: obesity grade I         Labs/Tests/Procedures/Meds     Row Name 10/14/20 1242          Labs/Procedures/Meds    Lab Results Reviewed  reviewed     Lab Results Comments  glu 121-195        Diagnostic Tests/Procedures    Diagnostic Test/Procedure Reviewed  reviewed        Medications    Pertinent Medications Reviewed  reviewed     Pertinent Medications Comments  folic acid, magox, MVT           Estimated/Assessed Needs     Row Name 10/14/20 1242          Estimated/Assessed Needs    Additional Documentation  Calorie Requirements (Group);Protein Requirements (Group);Fluid Requirements (Group)        Calorie Requirements    Estimated Calorie Need Method  Jose-St Lorenz     Estimated Calorie Requirement Comment  2029 kcal ( mifflin no factor obese w stated wt)        Protein Requirements    Est Protein Requirement Amount (gms/kg)  0.8 gm protein 90 gm pro        Fluid Requirements    Estimated Fluid Requirement Method  RDA Method 2029 ml         Nutrition Prescription Ordered     Row Name 10/14/20 1243          Nutrition Prescription PO    Current PO Diet  Regular          Evaluation of Received Nutrient/Fluid Intake     Row Name 10/14/20 1243          Fluid Intake Evaluation    Oral Fluid (mL)  847 ave x 3, 42%        PO Evaluation    Number of Meals  8     % PO Intake  84               Problem/Interventions:  Problem 1     Row Name 10/14/20 1244          Nutrition Diagnoses Problem 1    Problem 1  Altered GI Function     Etiology (related to)  Medical Diagnosis     Signs/Symptoms (evidenced by)  Report/Observation               Intervention Goal     Row Name 10/14/20 1245          Intervention Goal    General  Meet nutritional needs for age/condition     PO  Maintain intake;PO intake (%)     PO Intake %  75 % or greater     Weight  No significant weight loss         Nutrition Intervention     Row Name 10/14/20 1245          Nutrition Intervention    RD/Tech Action  Follow Tx progress           Education/Evaluation     Row Name 10/14/20 1245          Education    Education  No discharge needs identified at this time        Monitor/Evaluation    Monitor  Per protocol;I&O;PO intake;Pertinent labs;Weight;Symptoms           Electronically signed by:  Trang Le RD  10/14/20 12:45 EDT

## 2020-10-14 NOTE — PLAN OF CARE
Problem: Adult Inpatient Plan of Care  Goal: Plan of Care Review  Recent Flowsheet Documentation  Taken 10/14/2020 0941 by Rosalba Voss OTR  Plan of Care Reviewed With: patient  Outcome Summary: OT- Patient agreed to OT this morning after encouragement. Patient performed supine to sit from EOB with mod A X 2. Patient performed mobility with rolling walker to bathroom (6 ft.) with min A X 2 with cues for partial weight bearing. Patient managed LB clothing with min assist for toileting. Patient continues to have tremors which limits his ind. with ADLs and mobility. He continues to require cues for safety/partial WB status. Recommend SNF at discharge.

## 2020-10-14 NOTE — PROGRESS NOTES
"SERVICE: [facility] HOSPITALIST    CHIEF COMPLAINT: Short-term physical rehab placement    SUBJECTIVE:  Examined on rounds this morning    Patient resting in bed, pain well controlled, not having any further symptoms of alcohol withdrawal, patient states that his left arm tremor has been that way for at least 20 years from his rotator cuff injury.  Which means his CIWA score will almost always be a 4 at minimum.    Denies any other acute issues, nursing notes reviewed, no acute events overnight.    Review of systems is negative for fever, chills, nausea vomiting or diarrhea.    OBJECTIVE:    /74 (BP Location: Right arm, Patient Position: Sitting)   Pulse 105   Temp 93 °F (33.9 °C) (Oral)   Resp 20   Ht 182.9 cm (72\")   Wt 113 kg (250 lb)   SpO2 95%   BMI 33.91 kg/m²     MEDS/LABS REVIEWED AND ORDERED    apixaban, 2.5 mg, Oral, Q12H  chlordiazePOXIDE, 10 mg, Oral, Q6H  folic acid, 1 mg, Oral, Daily  Magnesium Oxide, 400 mg, Oral, Daily  multivitamin with minerals, 1 tablet, Oral, Daily  pantoprazole, 40 mg, Oral, Q AM  sodium chloride, 10 mL, Intravenous, Q12H  vancomycin, 125 mg, Oral, 4x Daily        Physical Exam  Vitals signs and nursing note reviewed.   Constitutional:       General: He is not in acute distress.     Appearance: He is obese. He is not toxic-appearing.   Eyes:      Pupils: Pupils are equal, round, and reactive to light.   Cardiovascular:      Rate and Rhythm: Normal rate and regular rhythm.      Heart sounds: No murmur.   Pulmonary:      Effort: Pulmonary effort is normal. No respiratory distress.      Breath sounds: No wheezing or rales.   Abdominal:      General: Bowel sounds are normal. There is no distension.      Tenderness: There is no abdominal tenderness. There is no guarding.   Musculoskeletal:      Right lower leg: No edema.      Left lower leg: No edema.   Neurological:      General: No focal deficit present.      Mental Status: He is alert. Mental status " is at baseline.      Cranial Nerves: No cranial nerve deficit.      Comments: Left arm tremor same as yesterday   Psychiatric:         Mood and Affect: Mood normal.         Behavior: Behavior normal.         LAB/DIAGNOSTICS:    Lab Results (last 24 hours)     ** No results found for the last 24 hours. **           No radiology results for the last day      ASSESSMENT/PLAN:    Etoh WD w/ recent seizure  -CIWA scores have remained for mostly due to his tremor, but the patient states is baseline  -Seizure happened just prior to recent admission at [facility], their neurologist did not discharge him with antiseizure medication  -currently on a scheduled Librium wean,  finish Librium taper as already ordered  -Medically stable for discharge to physical rehab     Right intertrochanteric comminuted femur fracture  -Changed to day 3 status post right hip IM nailing  -Ortho is following per their recs     Acute blood loss anemia on chronic macrocytic anemia  -2 units of blood on 10/11, hemoglobin has since been stable  -CBC as outpt     C. difficile colitis  -Found on recent admission at [facility], continue oral vancomycin, and spore precautions     Reflux esophagitis  -Patient is currently on famotidine, but GI recommended daily PPI changed yesterday    DISPO: presumed Medicaid pending, working on placement issues

## 2020-10-14 NOTE — PROGRESS NOTES
"SERVICE: [facility] HOSPITALIST    CHIEF COMPLAINT: Short-term physical rehab placement    SUBJECTIVE:  Examined on rounds this morning    Patient resting in bed, pain well controlled, not having any further symptoms of alcohol withdrawal, patient states that his left arm tremor has been that way for at least 20 years from his rotator cuff injury.  Which means his CIWA score will almost always be a 4 at minimum.    Denies any other acute issues, nursing notes reviewed, no acute events overnight.    Review of systems is negative for fever, chills, nausea vomiting or diarrhea.    OBJECTIVE:    /78 (BP Location: Right arm, Patient Position: Lying)   Pulse 105   Temp 99.3 °F (37.4 °C) (Oral)   Resp 18   Ht 182.9 cm (72\")   Wt 113 kg (250 lb)   SpO2 96%   BMI 33.91 kg/m²     MEDS/LABS REVIEWED AND ORDERED    apixaban, 2.5 mg, Oral, Q12H  chlordiazePOXIDE, 10 mg, Oral, Q6H  folic acid, 1 mg, Oral, Daily  Magnesium Oxide, 400 mg, Oral, Daily  multivitamin with minerals, 1 tablet, Oral, Daily  pantoprazole, 40 mg, Oral, Q AM  sodium chloride, 10 mL, Intravenous, Q12H  vancomycin, 125 mg, Oral, 4x Daily        Physical Exam  Vitals signs and nursing note reviewed.   Constitutional:       General: He is not in acute distress.     Appearance: He is obese. He is not toxic-appearing.   Eyes:      Pupils: Pupils are equal, round, and reactive to light.   Cardiovascular:      Rate and Rhythm: Normal rate and regular rhythm.      Heart sounds: No murmur.   Pulmonary:      Effort: Pulmonary effort is normal. No respiratory distress.      Breath sounds: No wheezing or rales.   Abdominal:      General: Bowel sounds are normal. There is no distension.      Tenderness: There is no abdominal tenderness. There is no guarding.   Musculoskeletal:      Right lower leg: No edema.      Left lower leg: No edema.   Neurological:      General: No focal deficit present.      Mental Status: He is alert. Mental status " is at baseline.      Cranial Nerves: No cranial nerve deficit.      Comments: Left arm tremor same as yesterday   Psychiatric:         Mood and Affect: Mood normal.         Behavior: Behavior normal.         LAB/DIAGNOSTICS:    Lab Results (last 24 hours)     Procedure Component Value Units Date/Time    Basic Metabolic Panel [[id number]]  (Abnormal) Collected: 10/13/20 0758    Specimen: Blood Updated: 10/13/20 0835     Glucose 121 mg/dL      BUN 8 mg/dL      Creatinine 0.75 mg/dL      Sodium 136 mmol/L      Potassium 3.6 mmol/L      Chloride 104 mmol/L      CO2 24.8 mmol/L      Calcium 8.3 mg/dL      eGFR Non African Amer 110 mL/min/1.73      BUN/Creatinine Ratio 10.7     Anion Gap 7.2 mmol/L     Narrative:      GFR Normal >60  Chronic Kidney Disease <60  Kidney Failure <15      Hemoglobin & Hematocrit, Blood [[id number]]  (Abnormal) Collected: 10/13/20 0758    Specimen: Blood Updated: 10/13/20 0807     Hemoglobin 8.6 g/dL      Hematocrit 27.3 %            No radiology results for the last day      ASSESSMENT/PLAN:    Etoh WD w/ recent seizure  -Patient CIWA scores have remained 4, patient was kept inpatient today for further monitoring to make sure that patient was not going into further withdrawals  -CIWA scores have remained for mostly due to his tremor, but the patient states is baseline  -Seizure happened just prior to recent admission at [facility], their neurologist did not discharge him with antiseizure medication  -He is currently on a scheduled Librium wean, still without PRN's for Ativan, finish Librium taper as already ordered  -Medically stable for discharge to physical rehab     Right intertrochanteric comminuted femur fracture  -Changed to day 3 status post right hip IM nailing  -Ortho is following per their recs     Acute blood loss anemia on chronic macrocytic anemia  -Got 2 units of blood on 10/11 for hemoglobin of 6.5, hemoglobin has been stable  -GI soft today, no signs of a significant GI source of  vashti, will follow up outpatient in 8 weeks     C. difficile colitis  -Found on recent admission at [facility], continue oral vancomycin, and spore precautions     Reflux esophagitis  -Patient is currently on famotidine, but GI recommended daily PPI changed yesterday    Patient is a presumed Medicaid pending, so [facility] not be able to take his insurance, mother is asking for self pay TCU admit, will start TCU eval tomorrow

## 2020-10-14 NOTE — THERAPY TREATMENT NOTE
Acute Care - Physical Therapy Treatment Note   [place]     Patient Name: Simone Boykin  : 1970  MRN: [id number]  Today's Date: 10/14/2020   Onset of Illness/Injury or Date of Surgery: 10/08/20       PT Assessment (last 12 hours)      PT Evaluation and Treatment     Row Name 10/14/20 0113          Physical Therapy Time and Intention    Subjective Information  complains of;pain  -BP     Document Type  therapy note (daily note)  -BP     Mode of Treatment  physical therapy  -BP     Patient Effort  fair  -BP     Symptoms Noted During/After Treatment  fatigue  -BP     Comment  Patient with significant B UE/LE tremors. Patient initially refuses therapy this morning. Requires encouragement from therapists,  and nurse to participate with PT. Patient tolerates minimal activity due to fatigue and tremors.   -BP     Row Name 10/14/20 0976          General Information    Patient/Family/Caregiver Comments/Observations  Patient supine in bed with HOB elevated. RN present initially to give meds. Patient agreeable with encouragement from multiple staff members.   -BP     Existing Precautions/Restrictions  fall;weight bearing 50% PWB R LE   -BP     Limitations/Impairments  -- Significant tremors due to alcohol withdrawl   -BP     Row Name 10/14/20 1109          Pain    Additional Documentation  Pain Scale: Numbers Pre/Post-Treatment (Group)  -BP     Row Name 10/14/20 0906          Pain Scale: Numbers Pre/Post-Treatment    Pretreatment Pain Rating  6/10  -BP     Posttreatment Pain Rating  6/10  -BP     Pain Location - Side  Right  -BP     Row Name 10/14/20 0958          Pain Scale: Word Pre/Post-Treatment    Pain Intervention(s)  Repositioned  -BP     Row Name 10/14/20 7182          Bed Mobility    Bed Mobility  supine-sit  -BP     Supine-Sit [place] (Bed Mobility)  minimum assist (75% patient effort);verbal cues  -BP     Bed Mobility, Safety Issues  decreased use of arms for pushing/pulling tremors    -BP     Assistive Device (Bed Mobility)  bed rails;head of bed elevated  -BP     Comment (Bed Mobility)  patient requires min A for R LE out of bed. Extended time and cues for safety required.   -BP     Row Name 10/14/20 0940          Transfers    Transfers  sit-stand transfer;stand-sit transfer;toilet transfer  -BP     Comment (Transfers)  Verbal cues required for hand placement. Patient requires mod A x 2 from the bed and min A x 2 from the [facility] over the toilet.   -BP     Sit-Stand [place] (Transfers)  minimum assist (75% patient effort);moderate assist (50% patient effort);2 person assist;verbal cues  -BP     Stand-Sit [place] (Transfers)  minimum assist (75% patient effort);2 person assist;verbal cues  -BP     [place] Level (Toilet Transfer)  minimum assist (75% patient effort);2 person assist;verbal cues  -BP     Assistive Device (Toilet Transfer)  raised toilet seat;walker, front-wheeled  -BP     Row Name 10/14/20 0940          Sit-Stand Transfer    Assistive Device (Sit-Stand Transfers)  walker, front-wheeled  -BP     Row Name 10/14/20 0940          Stand-Sit Transfer    Assistive Device (Stand-Sit Transfers)  walker, front-wheeled  -BP     Row Name 10/14/20 0940          Toilet Transfer    Type (Toilet Transfer)  sit-stand;stand-sit  -BP     Row Name 10/14/20 0940          Gait/Stairs (Locomotion)    [place] Level (Gait)  minimum assist (75% patient effort);2 person assist;verbal cues  -BP     Assistive Device (Gait)  walker, front-wheeled  -BP     Distance in Feet (Gait)  6 from bed to bathroom and 6 feet from bath to recliner  -BP     Pattern (Gait)  swing-to  -BP     Deviations/Abnormal Patterns (Gait)  stride length decreased;base of support, wide;weight shifting decreased  -BP     Bilateral Gait Deviations  forward flexed posture  -BP     Comment (Gait/Stairs)  Patient with significant tremors B UE's and LE's. Patient with significantly decreased gait speed with cues required to  maintain PWB. Patient requires min A to manage AD as well as cues for proper placement and safet management of device. Patient requires significantly increased time for directional changes.   -BP     Row Name 10/14/20 0940          Safety Issues, Functional Mobility    Safety Issues Affecting Function (Mobility)  insight into deficits/self-awareness;positioning of assistive device;problem-solving;safety precaution awareness;sequencing abilities tremors   -BP     Comment, Safety Issues/Impairments (Mobility)  Patient requires significant cues for safety   -BP     Row Name             Wound 10/09/20 1700 Right lateral hip Incision    Wound - Properties Group Placement Date: 10/09/20  -ME Placement Time: 1700  -ME Side: Right  -ME Orientation: lateral  -ME Location: hip  -ME, hip and leg incisions  Primary Wound Type: Incision  -ME Additional Comments: prineo, telfa, tegaderms  -ME    Retired Wound - Properties Group Date first assessed: 10/09/20  -ME Time first assessed: 1700  -ME Side: Right  -ME Location: hip  -ME, hip and leg incisions  Primary Wound Type: Incision  -ME Additional Comments: prineo, telfa, tegaderms  -ME    Row Name 10/14/20 0940          Plan of Care Review    Plan of Care Reviewed With  patient  -BP     Outcome Summary  PT: Patient requires significant encouragement to participate in therapy this morning. He fatigues quickly with minimal activity. He has been seen by PT QD/BID since 10/14 with minimal functional progress. He performs supine to sit transfer with min A, sit to/from stand transfers with min/mod A x 2 and gait x 6 feet and 5 feet with one seated break with min A x 2 and use of FWW. B UE/LE tremors limit overall mobility. He requires significant cues for safety and assist to manage device with forward gait and directional changes. He requires cues to adhere to PWB status with mobility. Recommend SNF with extended stay until weight bearing status has been lifted.   -BP     Row Name  10/14/20 0940          Positioning and Restraints    Pre-Treatment Position  in bed  -BP     Post Treatment Position  chair  -BP     In Chair  notified nsg;reclined;call light within reach;encouraged to call for assist  -BP     Row Name 10/14/20 0940          Progress Summary (PT)    Progress Toward Functional Goals (PT)  progress toward functional goals is fair  -BP     Barriers to Overall Progress (PT)  Tremors and overall motivation to participate   -BP     Row Name 10/14/20 0940          Therapy Plan Review/Discharge Plan (PT)    Therapy Plan Review (PT)  patient;evaluation/treatment results reviewed;care plan/treatment goals reviewed;risks/benefits reviewed;current/potential barriers reviewed;participants voiced agreement with care plan  -BP       User Key  (r) = Recorded By, (t) = Taken By, (c) = Cosigned By    Initials Name Provider Type    Jaleel Kim, PT Physical Therapist    ME Izabella Lyons RN Registered Nurse        Physical Therapy Education                 Title: PT OT SLP Therapies (In Progress)     Topic: Physical Therapy (In Progress)     Point: Mobility training (In Progress)     Learning Progress Summary           Patient Acceptance, E,TB, NR by BP at 10/14/2020 1104    Acceptance, E, VU by AB at 10/13/2020 0529    Acceptance, E,TB, VU by  at 10/12/2020 1540    Acceptance, E,TB, VU by  at 10/12/2020 1201    Acceptance, E, VU by  at 10/10/2020 1256    Comment: Education on functional mobility and gait with FWW, PWB right leg.                   Point: Home exercise program (Not Started)     Learner Progress:  Not documented in this visit.          Point: Body mechanics (Done)     Learning Progress Summary           Patient Acceptance, E, VU by AB at 10/13/2020 0529    Acceptance, E, VU by LN at 10/10/2020 1256    Comment: Education on functional mobility and gait with FWW, PWB right leg.                   Point: Precautions (In Progress)     Learning Progress Summary            Patient Acceptance, E,TB, NR by BP at 10/14/2020 1104    Acceptance, E, VU by AB at 10/13/2020 0529    Acceptance, E,TB, VU by  at 10/12/2020 1540    Acceptance, E,TB, VU by  at 10/12/2020 1201    Acceptance, E, VU by LN at 10/10/2020 1256    Comment: Education on functional mobility and gait with FWW, PWB right leg.                               User Key     Initials Effective Dates Name Provider Type Discipline     06/08/18 -  Graciela Oliveira, PT Physical Therapist PT    AB 06/16/16 -  Marina Hendrix, RN Registered Nurse Nurse    LN 08/09/20 -  Nanette Rose, PT Physical Therapist PT     04/03/18 -  Jaleel Laird, PT Physical Therapist PT              PT Recommendation and Plan  Anticipated Discharge Disposition (PT): skilled nursing facility, extended care facility  Therapy Frequency (PT): daily  Progress Summary (PT)  Progress Toward Functional Goals (PT): progress toward functional goals is fair  Barriers to Overall Progress (PT): Tremors and overall motivation to participate   Plan of Care Reviewed With: patient  Outcome Summary: PT: Patient requires significant encouragement to participate in therapy this morning. He fatigues quickly with minimal activity. He has been seen by PT QD/BID since 10/14 with minimal functional progress. He performs supine to sit transfer with min A, sit to/from stand transfers with min/mod A x 2 and gait x 6 feet and 5 feet with one seated break with min A x 2 and use of FWW. B UE/LE tremors limit overall mobility. He requires significant cues for safety and assist to manage device with forward gait and directional changes. He requires cues to adhere to PWB status with mobility. Recommend SNF with extended stay until weight bearing status has been lifted.   Outcome Measures     Row Name 10/12/20 1500 10/12/20 1400 10/12/20 1200       How much help from another person do you currently need...    Turning from your back to your side while in flat bed  without using bedrails?  2  -LH  --  2  -LH    Moving from lying on back to sitting on the side of a flat bed without bedrails?  2  -LH  --  2  -LH    Moving to and from a bed to a chair (including a wheelchair)?  2  -LH  --  2  -LH    Standing up from a chair using your arms (e.g., wheelchair, bedside chair)?  2  -LH  --  2  -LH    Climbing 3-5 steps with a railing?  1  -LH  --  1  -LH    To walk in hospital room?  2  -LH  --  2  -LH    AM-PAC 6 Clicks Score (PT)  11  -LH  --  11  -LH       How much help from another is currently needed...    Putting on and taking off regular lower body clothing?  --  2  -EN  --    Bathing (including washing, rinsing, and drying)  --  2  -EN  --    Toileting (which includes using toilet bed pan or urinal)  --  2  -EN  --    Putting on and taking off regular upper body clothing  --  4  -EN  --    Taking care of personal grooming (such as brushing teeth)  --  4  -EN  --    Eating meals  --  4  -EN  --    AM-PAC 6 Clicks Score (OT)  --  18  -EN  --       Functional Assessment    Outcome Measure Options  AM-PAC 6 Clicks Basic Mobility (PT)  -  --  AM-[facility] 6 Clicks Basic Mobility (PT)  -      User Key  (r) = Recorded By, (t) = Taken By, (c) = Cosigned By    Initials Name Provider Type     Graciela Oliveira, PT Physical Therapist    EN Rosalba Voss, OTR Occupational Therapist           Time Calculation:   PT Charges     Row Name 10/14/20 1105             Time Calculation    Start Time  0940  -BP      Stop Time  1007  -BP      Time Calculation (min)  27 min  -BP      PT Received On  10/14/20  -BP      PT - Next Appointment  10/15/20  -BP         Timed Charges    86324 - PT Therapeutic Exercise Minutes  27  -BP        User Key  (r) = Recorded By, (t) = Taken By, (c) = Cosigned By    Initials Name Provider Type    BP Jaleel Laird, PT Physical Therapist        Therapy Charges for Today     Code Description Service Date Service Provider Modifiers Qty    [id number]  PT  THER PROC EA 15 MIN 10/14/2020 Jaleel Laird, PT GP 2          PT G-Codes  Outcome Measure Options: AM-PAC 6 Clicks Basic Mobility (PT)  AM-PAC 6 Clicks Score (PT): 11  AM-PAC 6 Clicks Score (OT): 18    Jaleel Laird, PT  10/14/2020

## 2020-10-14 NOTE — PLAN OF CARE
Goal Outcome Evaluation:  Plan of Care Reviewed With: patient  Progress: no change  Outcome Summary: Pt up to chair, ambulate to bathroom this shift. PO pain meds for pain control. Needs assistance and encouragement to ambulate more frequently. Room air. tolerating diet. voiding w/o dif

## 2020-10-14 NOTE — NURSING NOTE
Continued Stay Note  SUSANNA Smith     Patient Name: Simone Boykin  MRN: [id number]  Today's Date: 10/14/2020    Admit Date: 10/8/2020    Discharge Plan     Row Name 10/14/20 6605       Plan    Plan Comments  Eliquis prescription is no cost to the patient per [facility] in [place].  Will continue to follow    Row Name 10/14/20 1304       Plan    Plan Comments  Referral given to [facility] for PT at home.  Will continue to follow    Row Name 10/14/20 1250       Plan    Plan Comments  Spoke with Medicaid office and Mr Boykin has home health coverage and no coverage for rehab.  Denisse STEPHENS and I went in to evaluate Mr Boykin's ability to get up and he is very slow to move and it requires great effort on his part to get up out of the chair.  He states he has 5 steps at home to get in the door.  He is very tremulous and unsteady on his feet.  Provided him with a new rolling walker.  He was able to walk about 5 feet and then became weak and backed up and sat down.  Plan is home with home health.        Discharge Codes    No documentation.             Luiza Marr RN

## 2020-10-14 NOTE — PROGRESS NOTES
POD# 5 s/p right hip nail     Subjective: Simone Boykin resting in chair this afternoon did attempt to work with physical therapy reports due to the sickness of the handles it was made more difficult and therefore case management did get him another rolling walker.  He was able to go stand up with minimal assistance from the chair however did not appear to take great effort on his part, was able to get up to the walker take a few steps and then became extremely fatigued.  Pain fairly well controlled continues to exhibit tremors bilateral upper extremities.  Continues to deny residual numbness or tingling right lower extremity.    Objective:  Vitals:    10/13/20 2013 10/13/20 2320 10/14/20 0702 10/14/20 1108   BP: 120/78 129/76 125/82 118/74   BP Location: Right arm Right arm Right arm Right arm   Patient Position: Lying Lying Lying Sitting   Pulse: 105 105 101 105   Resp: 18 20 20 20   Temp: 98.3 °F (36.8 °C) 98.5 °F (36.9 °C) 97.4 °F (36.3 °C) 93 °F (33.9 °C)   TempSrc: Oral Oral Oral Oral   SpO2: 96% 97% 98% 95%   Weight:       Height:           Results from last 7 days   Lab Units 10/13/20  0758 10/12/20  0441 10/11/20  0424  10/09/20  0958 10/08/20  1458   WBC 10*3/mm3  --   --  9.00  --  5.17 5.25   HEMOGLOBIN g/dL 8.6* 8.5* 6.5*   < > 8.4* 8.7*   HEMATOCRIT % 27.3* 27.2* 20.9*   < > 26.4* 27.5*   PLATELETS 10*3/mm3  --   --  145  --  118* 114*    < > = values in this interval not displayed.       Results from last 7 days   Lab Units 10/13/20  0758 10/11/20  0424 10/10/20  0538   SODIUM mmol/L 136 137 134*   POTASSIUM mmol/L 3.6 4.1 5.0   CHLORIDE mmol/L 104 106 105   CO2 mmol/L 24.8 22.2 17.1*   BUN mg/dL 8 11 10   CREATININE mg/dL 0.75* 0.82 0.83   GLUCOSE mg/dL 121* 137* 195*   CALCIUM mg/dL 8.3* 8.0* 7.9*       Exam:    Right lower extremity exam  Dressing clean dry and intact  Positive sensation light touch all distributions right lower extremity  Negative calf tenderness, negative Homans sign  Flex  extend all digits right foot, dorsiflexion plantarflexion intact right ankle  2+ dorsalis pedis pulse  Moderate swelling ankle patient reports at baseline  Brisk cap refill all digits right foot    Impression: s/p right hip nail    Plan:  1. PT/OT- ambulate, partial weight-bearing right lower extremity  2. Pain control-Percocet  3. DVT prophylaxis-Eliquis twice daily  4. Wound care-Dermabond remain intact until follow-up in clinic   5. Disposition-disposition per hospitalist however case management experiencing difficulty with short-term rehab will likely have him return home with home health physical therapy versus the option of self-pay short-term rehab.

## 2020-10-14 NOTE — NURSING NOTE
Continued Stay Note  SUSANNA Smith     Patient Name: Simone Boykin  MRN: [id number]  Today's Date: 10/14/2020    Admit Date: 10/8/2020    Discharge Plan     Row Name 10/14/20 1250       Plan    Plan Comments  Spoke with Medicaid office and Mr Boykin has home health coverage and no coverage for rehab.  Denisse STEPHENS and I went in to evaluate Mr Boykin's ability to get up and he is very slow to move and it requires great effort on his part to get up out of the chair.  He states he has 5 steps at home to get in the door.  He is very tremulous and unsteady on his feet.  Provided him with a new rolling walker.  He was able to walk about 5 feet and then became weak and backed up and sat down.  Plan is home with home health.        Discharge Codes    No documentation.             Luiza Marr RN

## 2020-10-14 NOTE — SIGNIFICANT NOTE
"   10/14/20 0918   OTHER   Discipline occupational therapist   Rehab Time/Intention   Session Not Performed patient/family declined treatment;other (see comments)  (Patient declined OT  this AM. Stated, \"come back this afternoon...I don't know what's going on!\" in regards to discharge. Encouraged participation for improved strength/independence as well as for acceptance to rehab facility,  pt continued to decline.) Will check again this PM.     "

## 2020-10-14 NOTE — NURSING NOTE
Continued Stay Note  SUSANNA Smith     Patient Name: Simone Boykin  MRN: [id number]  Today's Date: 10/14/2020    Admit Date: 10/8/2020    Discharge Plan     Row Name 10/14/20 0938       Plan    Plan Comments  Received call from [facility] and they are unable to accept the patient because he refused therapy yesterday.  I went and spoke with the patient and let him know that he will not get any rehab is he does not participate in his therapy.  He states he will do so.  Will continue to follow        Discharge Codes    No documentation.             Luiza Marr RN

## 2020-10-14 NOTE — PLAN OF CARE
Problem: Adult Inpatient Plan of Care  Goal: Plan of Care Review  Recent Flowsheet Documentation  Taken 10/14/2020 0940 by Jaleel Laird, PT  Plan of Care Reviewed With: patient  Outcome Summary: PT: Patient requires significant encouragement to participate in therapy this morning. He fatigues quickly with minimal activity. He has been seen by PT QD/BID since 10/14 with minimal functional progress. He performs supine to sit transfer with min A, sit to/from stand transfers with min/mod A x 2 and gait x 6 feet and 5 feet with one seated break with min A x 2 and use of FWW. B UE/LE tremors limit overall mobility. He requires significant cues for safety and assist to manage device with forward gait and directional changes. He requires cues to adhere to PWB status with mobility. Recommend SNF with extended stay until weight bearing status has been lifted.

## 2020-10-14 NOTE — THERAPY TREATMENT NOTE
Acute Care - Occupational Therapy Treatment Note   Noris Ballesteros     Patient Name: Simone Boykin  : 1970  MRN: [id number]  Today's Date: 10/14/2020  Onset of Illness/Injury or Date of Surgery: 10/08/20  Date of Referral to OT: 10/09/20  Referring Physician: Dr. Solo    Admit Date: 10/8/2020       ICD-10-CM ICD-9-CM   1. Closed comminuted intertrochanteric fracture of right femur, initial encounter (CMS/[facility])  S72.141A 820.21   2. History of alcohol abuse  F10.11 305.03   3. Clostridioides difficile infection  A49.8 041.84     Patient Active Problem List   Diagnosis   • Neck mass   • Anxiety   • Pain of left lower extremity   • Swelling of lower leg   • Hematoma   • Closed comminuted intertrochanteric fracture of proximal end of right femur (CMS/[facility])     Past Medical History:   Diagnosis Date   • Anxiety    • Arthritis     JUAN KNEES   • Depression    • Fall    • GERD (gastroesophageal reflux disease)    • Heartburn     ON OCC   • Mass     BASE OF POSTERIOR NECK   • Shoulder dislocation     LONG TERM ISSUES WITH     Past Surgical History:   Procedure Laterality Date   • EXCISION MASS HEAD/NECK N/A 2016    Procedure: MASS SOFT TISSUE EXCISION POSTERIOR NECK;  Surgeon: Crow Ayala Jr., MD;  Location: [facility] OR;  Service:    • KNEE ARTHROSCOPY Right           OT ASSESSMENT FLOWSHEET (last 12 hours)      OT Evaluation and Treatment     Row Name 10/14/20 0941                   OT Time and Intention    Subjective Information  complains of;pain  -EN        Document Type  therapy note (daily note)  -EN        Mode of Treatment  occupational therapy  -EN        Patient Effort  fair  -EN        Symptoms Noted During/After Treatment  fatigue  -EN        Comment  Patient initially refused OT this am. After signficant encouragement from therapists, nurse and , patient agreed to participate.   -EN           General Information    Patient/Family/Caregiver Comments/Observations  Patient reclined  in bed, agreed to therapy after encouragement.  -EN        Existing Precautions/Restrictions  fall;weight bearing 50% WB  -EN        Limitations/Impairments  other (see comments) tremors thoughout body   -EN        Risks Reviewed  patient:;LOB;increased discomfort  -EN        Benefits Reviewed  patient:;improve function;increase independence;increase strength  -EN        Barriers to Rehab  -- tremors, motivation  -EN           Cognition    Personal Safety Interventions  gait belt;nonskid shoes/slippers when out of bed  -EN           Pain Scale: Numbers Pre/Post-Treatment    Pretreatment Pain Rating  6/10  -EN        Posttreatment Pain Rating  6/10  -EN        Pain Location - Side  Right  -EN        Pain Intervention(s)  Medication (See MAR);Repositioned  -EN           Bed Mobility    Bed Mobility  supine-sit  -EN        Supine-Sit [place] (Bed Mobility)  verbal cues;minimum assist (75% patient effort)  -EN        Bed Mobility, Safety Issues  other (see comments);decreased use of legs for bridging/pushing tremors  -EN        Assistive Device (Bed Mobility)  bed rails;head of bed elevated  -EN        Comment (Bed Mobility)  Extended time required for supine to sit  -EN           Functional Mobility    Functional Mobility- Ind. Level  minimum assist (75% patient effort);verbal cues required  -EN        Functional Mobility- Device  rolling walker  -EN        Functional Mobility-Distance (Feet)  11 11 ft w/one seated rest break  -EN           Transfer Assessment/Treatment    Transfers  sit-stand transfer;stand-sit transfer;toilet transfer  -EN        Comment (Transfers)  verbal cues for hand placement and cues for WB status. Required mod assist to stand from EOB and min assist X 2 from BSC over toilet  -EN           Transfers    Sit-Stand [place] (Transfers)  verbal cues;minimum assist (75% patient effort);moderate assist (50% patient effort);2 person assist  -EN        Stand-Sit [place] (Transfers)   minimum assist (75% patient effort);2 person assist;verbal cues  -EN        [place] Level (Toilet Transfer)  minimum assist (75% patient effort);2 person assist;verbal cues  -EN        Assistive Device (Toilet Transfer)  walker, front-wheeled;other (see comments) BSC over toilet  -EN           Sit-Stand Transfer    Assistive Device (Sit-Stand Transfers)  walker, front-wheeled  -EN           Stand-Sit Transfer    Assistive Device (Stand-Sit Transfers)  walker, front-wheeled  -EN           Toilet Transfer    Type (Toilet Transfer)  sit-stand;stand-sit  -EN           Safety Issues, Functional Mobility    Safety Issues Affecting Function (Mobility)  insight into deficits/self-awareness;positioning of assistive device;problem-solving;safety precaution awareness;sequencing abilities;other (see comments) tremors  -EN           Lower Body Dressing Assessment/Training    Comment (Lower Body Dressing)  doffed pants over hips for toileting with min assist to maintaing balance, donned over hips with min assist. Patient stated he put pants on from bed this am with reacher independently.  -EN           Wound 10/09/20 1700 Right lateral hip Incision    Wound - Properties Group Placement Date: 10/09/20  -ME Placement Time: 1700  -ME Side: Right  -ME Orientation: lateral  -ME Location: hip  -ME, hip and leg incisions  Primary Wound Type: Incision  -ME Additional Comments: prineo, telfa, tegaderms  -ME    Retired Wound - Properties Group Date first assessed: 10/09/20  -ME Time first assessed: 1700  -ME Side: Right  -ME Location: hip  -ME, hip and leg incisions  Primary Wound Type: Incision  -ME Additional Comments: prineo, telfa, tegaderms  -ME       Plan of Care Review    Plan of Care Reviewed With  patient  -EN        Outcome Summary  OT- Patient agreed to OT this morning after encouragement. Patient performed supine to sit from EOB with mod A X 2. Patient performed mobility with rolling walker to bathroom (6 ft.) with min A X  2 with cues for partial weight bearing. Patient managed LB clothing with min assist for toileting. Patient continues to have tremors which limits his ind. with ADLs and mobility. He continues to require cues for safety/partial WB status. Recommend SNF at discharge.  -EN           Transfer Goal 1 (OT)    Activity/Assistive Device (Transfer Goal 1, OT)  sit-to-stand/stand-to-sit;toilet;commode, bedside without drop arms;walker, rolling  -EN        [place] Level/Cues Needed (Transfer Goal 1, OT)  minimum assist (75% or more patient effort)  -EN        Time Frame (Transfer Goal 1, OT)  by discharge  -EN        Progress/Outcome (Transfer Goal 1, OT)  progress slower than expected min/mod A X 2 currently  -EN           Dressing Goal 1 (OT)    Activity/Device (Dressing Goal 1, OT)  lower body dressing;long-handled shoe horn;reacher;sock-aid  -EN        [place]/Cues Needed (Dressing Goal 1, OT)  minimum assist (75% or more patient effort)  -EN        Time Frame (Dressing Goal 1, OT)  by discharge  -EN        Progress/Outcome (Dressing Goal 1, OT)  goal met  -EN           Positioning and Restraints    Pre-Treatment Position  in bed  -EN        Post Treatment Position  chair  -EN        In Chair  notified nsg;reclined;call light within reach;encouraged to call for assist  -EN           Progress Summary (OT)    Progress Toward Functional Goals (OT)  progress toward functional goals is gradual  -EN        Barriers to Overall Progress (OT)  motivation, tremors  -EN          User Key  (r) = Recorded By, (t) = Taken By, (c) = Cosigned By    Initials Name Effective Dates    EN Rosalba Voss OTR 07/05/20 -     ME Izabella Lyons, MADI 04/10/20 -          Occupational Therapy Education                 Title: PT OT SLP Therapies (In Progress)     Topic: Occupational Therapy (In Progress)     Point: ADL training (In Progress)     Description:   Instruct learner(s) on proper safety adaptation and remediation techniques  during self care or transfers.   Instruct in proper use of assistive devices.              Learning Progress Summary           Patient Acceptance, E, NR by EN at 10/14/2020 1139    Comment: benefits of activity, safety during transfers, ADL retraiing    Acceptance, E, VU by EN at 10/12/2020 1419    Comment: Safety during transfers, ADL retraining, PWB status    Acceptance, E, VU by EN at 10/10/2020 1055    Comment: Safety, transfers, PWB status                   Point: Precautions (In Progress)     Description:   Instruct learner(s) on prescribed precautions during self-care and functional transfers.              Learning Progress Summary           Patient Acceptance, E, NR by EN at 10/14/2020 1139    Comment: benefits of activity, safety during transfers, ADL retraiing    Acceptance, E, VU by EN at 10/12/2020 1419    Comment: Safety during transfers, ADL retraining, PWB status    Acceptance, E, VU by EN at 10/10/2020 1055    Comment: Safety, transfers, PWB status                               User Key     Initials Effective Dates Name Provider Type Discipline    EN 07/05/20 -  Rosalba Voss OTR Occupational Therapist OT                  OT Recommendation and Plan  Planned Therapy Interventions (OT): BADL retraining, adaptive equipment training, transfer/mobility retraining  Therapy Frequency (OT): 5 times/wk  Progress Toward Functional Goals (OT): progress toward functional goals is gradual  Plan of Care Review  Plan of Care Reviewed With: patient  Progress: no change  Outcome Summary: OT- Patient agreed to OT this morning after encouragement. Patient performed supine to sit from EOB with mod A X 2. Patient performed mobility with rolling walker to bathroom (6 ft.) with min A X 2 with cues for partial weight bearing. Patient managed LB clothing with min assist for toileting. Patient continues to have tremors which limits his ind. with ADLs and mobility. He continues to require cues for safety/partial WB  status. Recommend SNF at discharge.  Plan of Care Reviewed With: patient  Outcome Summary: OT- Patient agreed to OT this morning after encouragement. Patient performed supine to sit from EOB with mod A X 2. Patient performed mobility with rolling walker to bathroom (6 ft.) with min A X 2 with cues for partial weight bearing. Patient managed LB clothing with min assist for toileting. Patient continues to have tremors which limits his ind. with ADLs and mobility. He continues to require cues for safety/partial WB status. Recommend SNF at discharge.    Outcome Measures     Row Name 10/14/20 0941 10/12/20 1500 10/12/20 1400       How much help from another person do you currently need...    Turning from your back to your side while in flat bed without using bedrails?  --  2  -LH  --    Moving from lying on back to sitting on the side of a flat bed without bedrails?  --  2  -LH  --    Moving to and from a bed to a chair (including a wheelchair)?  --  2  -LH  --    Standing up from a chair using your arms (e.g., wheelchair, bedside chair)?  --  2  -LH  --    Climbing 3-5 steps with a railing?  --  1  -LH  --    To walk in hospital room?  --  2  -LH  --    AM-PAC 6 Clicks Score (PT)  --  11  -LH  --       How much help from another is currently needed...    Putting on and taking off regular lower body clothing?  2  -EN  --  2  -EN    Bathing (including washing, rinsing, and drying)  2  -EN  --  2  -EN    Toileting (which includes using toilet bed pan or urinal)  2  -EN  --  2  -EN    Putting on and taking off regular upper body clothing  4  -EN  --  4  -EN    Taking care of personal grooming (such as brushing teeth)  4  -EN  --  4  -EN    Eating meals  4  -EN  --  4  -EN    AM-PAC 6 Clicks Score (OT)  18  -EN  --  18  -EN       Functional Assessment    Outcome Measure Options  --  AM-PAC 6 Clicks Basic Mobility (PT)  -LH  --    Row Name 10/12/20 1200             How much help from another person do you currently need...     Turning from your back to your side while in flat bed without using bedrails?  2  -LH      Moving from lying on back to sitting on the side of a flat bed without bedrails?  2  -LH      Moving to and from a bed to a chair (including a wheelchair)?  2  -LH      Standing up from a chair using your arms (e.g., wheelchair, bedside chair)?  2  -LH      Climbing 3-5 steps with a railing?  1  -LH      To walk in hospital room?  2  -      AM-PAC 6 Clicks Score (PT)  11  -         Functional Assessment    Outcome Measure Options  AM-PAC 6 Clicks Basic Mobility (PT)  -        User Key  (r) = Recorded By, (t) = Taken By, (c) = Cosigned By    Initials Name Provider Type     Graciela Oliveira, PT Physical Therapist    EN Rosalba Voss OTR Occupational Therapist          Time Calculation:   Time Calculation- OT     Row Name 10/14/20 1140             Time Calculation- OT    OT Start Time  0940  -EN      OT Stop Time  1007  -EN      OT Time Calculation (min)  27 min  -EN         Timed Charges    12102 - OT Self Care/Mgmt Minutes  27  -EN        User Key  (r) = Recorded By, (t) = Taken By, (c) = Cosigned By    Initials Name Provider Type    EN Rosalba Voss OTCLARA Occupational Therapist        Therapy Charges for Today     Code Description Service Date Service Provider Modifiers Qty    [id number]  OT SELF CARE/MGMT/TRAIN EA 15 MIN 10/14/2020 Rosalba Voss OTR GO 2               COOPER [place]  10/14/2020

## 2020-10-15 PROCEDURE — 97110 THERAPEUTIC EXERCISES: CPT

## 2020-10-15 PROCEDURE — 97535 SELF CARE MNGMENT TRAINING: CPT

## 2020-10-15 PROCEDURE — 94799 UNLISTED PULMONARY SVC/PX: CPT

## 2020-10-15 PROCEDURE — 99232 SBSQ HOSP IP/OBS MODERATE 35: CPT | Performed by: INTERNAL MEDICINE

## 2020-10-15 RX ADMIN — OXYCODONE HYDROCHLORIDE AND ACETAMINOPHEN 1 TABLET: 7.5; 325 TABLET ORAL at 06:50

## 2020-10-15 RX ADMIN — OXYCODONE HYDROCHLORIDE AND ACETAMINOPHEN 2 TABLET: 7.5; 325 TABLET ORAL at 18:37

## 2020-10-15 RX ADMIN — MULTIPLE VITAMINS W/ MINERALS TAB 1 TABLET: TAB at 09:20

## 2020-10-15 RX ADMIN — SODIUM CHLORIDE, PRESERVATIVE FREE 10 ML: 5 INJECTION INTRAVENOUS at 09:20

## 2020-10-15 RX ADMIN — OXYCODONE HYDROCHLORIDE AND ACETAMINOPHEN 1 TABLET: 7.5; 325 TABLET ORAL at 07:52

## 2020-10-15 RX ADMIN — APIXABAN 2.5 MG: 2.5 TABLET, FILM COATED ORAL at 21:20

## 2020-10-15 RX ADMIN — PANTOPRAZOLE SODIUM 40 MG: 40 TABLET, DELAYED RELEASE ORAL at 06:50

## 2020-10-15 RX ADMIN — APIXABAN 2.5 MG: 2.5 TABLET, FILM COATED ORAL at 09:20

## 2020-10-15 RX ADMIN — FOLIC ACID 1 MG: 1 TABLET ORAL at 09:20

## 2020-10-15 RX ADMIN — CHLORDIAZEPOXIDE HYDROCHLORIDE 10 MG: 5 CAPSULE ORAL at 06:50

## 2020-10-15 RX ADMIN — SODIUM CHLORIDE, PRESERVATIVE FREE 10 ML: 5 INJECTION INTRAVENOUS at 21:20

## 2020-10-15 RX ADMIN — CHLORDIAZEPOXIDE HYDROCHLORIDE 10 MG: 5 CAPSULE ORAL at 18:37

## 2020-10-15 RX ADMIN — OXYCODONE HYDROCHLORIDE AND ACETAMINOPHEN 2 TABLET: 7.5; 325 TABLET ORAL at 12:47

## 2020-10-15 RX ADMIN — Medication 400 MG: at 09:20

## 2020-10-15 RX ADMIN — CHLORDIAZEPOXIDE HYDROCHLORIDE 10 MG: 5 CAPSULE ORAL at 12:47

## 2020-10-15 NOTE — THERAPY TREATMENT NOTE
Acute Care - Occupational Therapy Treatment Note   Noris Ballesteros     Patient Name: Simone Boykin  : 1970  MRN: [id number]  Today's Date: 10/15/2020  Onset of Illness/Injury or Date of Surgery: 10/08/20  Date of Referral to OT: 10/09/20  Referring Physician: Dr. Solo    Admit Date: 10/8/2020       ICD-10-CM ICD-9-CM   1. Closed comminuted intertrochanteric fracture of right femur, initial encounter (CMS/Formerly [facility])  S72.141A 820.21   2. History of alcohol abuse  F10.11 305.03   3. Clostridioides difficile infection  A49.8 041.84   4. Status post fracture of right hip  Z87.81 V15.51     Patient Active Problem List   Diagnosis   • Neck mass   • Anxiety   • Pain of left lower extremity   • Swelling of lower leg   • Hematoma   • Closed comminuted intertrochanteric fracture of proximal end of right femur (CMS/Formerly [facility])     Past Medical History:   Diagnosis Date   • Anxiety    • Arthritis     JUAN KNEES   • Depression    • Fall    • GERD (gastroesophageal reflux disease)    • Heartburn     ON OCC   • Mass     BASE OF POSTERIOR NECK   • Shoulder dislocation     LONG TERM ISSUES WITH     Past Surgical History:   Procedure Laterality Date   • EXCISION MASS HEAD/NECK N/A 2016    Procedure: MASS SOFT TISSUE EXCISION POSTERIOR NECK;  Surgeon: Crow Ayala Jr., MD;  Location: [facility];  Service:    • KNEE ARTHROSCOPY Right           OT ASSESSMENT FLOWSHEET (last 12 hours)      OT Evaluation and Treatment     Row Name 10/15/20 0921                   OT Time and Intention    Subjective Information  complains of;pain  -JJ        Document Type  therapy note (daily note)  -JJ        Mode of Treatment  occupational therapy  -JJ        Patient Effort  fair  -JJ        Symptoms Noted During/After Treatment  fatigue;increased pain  -JJ        Comment  pt required extended time to complete all functional activities  -JJ           General Information    Existing Precautions/Restrictions  fall;partial weight bearing  -JJ         Limitations/Impairments  -- continued tremors throughout whole body with mobility  -JJ           Cognition    Personal Safety Interventions  gait belt;nonskid shoes/slippers when out of bed  -JJ           Pain Scale: Numbers Pre/Post-Treatment    Pretreatment Pain Rating  -- pt w co pain in R LE with movement, did not rate  -JJ        Pain Location - Side  Right  -JJ        Pain Location - Orientation  lower  -JJ        Pain Location  extremity  -JJ        Pain Intervention(s)  Repositioned  -JJ           Bed Mobility    Supine-Sit [place] (Bed Mobility)  minimum assist (75% patient effort);verbal cues  -JJ        Assistive Device (Bed Mobility)  bed rails;head of bed elevated  -JJ        Comment (Bed Mobility)  pt required extended time to complete  -JJ           Functional Mobility    Functional Mobility- Ind. Level  minimum assist (75% patient effort);verbal cues required  -JJ        Functional Mobility- Device  rolling walker  -JJ        Functional Mobility-Distance (Feet)  3  -JJ        Functional Mobility- Comment  continued tremors through whole body with mobility, required extended time to complete steps with RW  -JJ           Transfer Assessment/Treatment    Transfers  sit-stand transfer;stand-sit transfer  -JJ           Transfers    Sit-Stand [place] (Transfers)  moderate assist (50% patient effort);2 person assist;verbal cues  -JJ        Stand-Sit [place] (Transfers)  minimum assist (75% patient effort);2 person assist;verbal cues  -JJ           Sit-Stand Transfer    Assistive Device (Sit-Stand Transfers)  walker, front-wheeled  -JJ           Safety Issues, Functional Mobility    Safety Issues Affecting Function (Mobility)  insight into deficits/self-awareness;judgment;safety precautions follow-through/compliance tremors  -JJ           Lower Body Dressing Assessment/Training    Comment (Lower Body Dressing)  pt required min assist to lorenza pants from bed with reacher. pt able to doff panst  over hips in standing with min assist and RW.   -JJ           Wound 10/09/20 1700 Right lateral hip Incision    Wound - Properties Group Placement Date: 10/09/20  -ME Placement Time: 1700  -ME Side: Right  -ME Orientation: lateral  -ME Location: hip  -ME, hip and leg incisions  Primary Wound Type: Incision  -ME Additional Comments: prineo, telfa, tegaderms  -ME    Retired Wound - Properties Group Date first assessed: 10/09/20  -ME Time first assessed: 1700  -ME Side: Right  -ME Location: hip  -ME, hip and leg incisions  Primary Wound Type: Incision  -ME Additional Comments: prineo, telfa, tegaderms  -ME       Plan of Care Review    Plan of Care Reviewed With  patient  -JJ        Outcome Summary  OT: pt required min assist for supine to sit transfer to EOB. pt required mod assist for functional transfers from EOB and recliner chair with RW. pt performed functional mobility x 3 feet with RW and extended time. pt with extensive tremors/shakiness which limits mobility. pt requires verbal cues throughout to maintain partial weight bearing of R LE. pt required extended time to complete all mobility and transfers. pt utilized reacher to lorenza pants from bed with min assist. pt able to doff pants off of hips in standing with min assist and RW for support.   -JJ           Positioning and Restraints    Pre-Treatment Position  in bed  -JJ        Post Treatment Position  bsc  -JJ        On BS commode  sitting;call light within reach;encouraged to call for assist  -JJ           Progress Summary (OT)    Progress Toward Functional Goals (OT)  progress toward functional goals is gradual  -JJ        Barriers to Overall Progress (OT)  motivation, tremors  -JJ          User Key  (r) = Recorded By, (t) = Taken By, (c) = Cosigned By    Initials Name Effective Dates    Ayah Shaw, OTR 07/05/20 -     ME Izabella Lyons, RN 04/10/20 -          Occupational Therapy Education                 Title: PT OT SLP Therapies (In  Progress)     Topic: Occupational Therapy (In Progress)     Point: ADL training (In Progress)     Description:   Instruct learner(s) on proper safety adaptation and remediation techniques during self care or transfers.   Instruct in proper use of assistive devices.              Learning Progress Summary           Patient Acceptance, E, NR by EN at 10/14/2020 1139    Comment: benefits of activity, safety during transfers, ADL retraiing    Acceptance, E, VU by EN at 10/12/2020 1419    Comment: Safety during transfers, ADL retraining, PWB status    Acceptance, E, VU by EN at 10/10/2020 1055    Comment: Safety, transfers, PWB status                   Point: Precautions (Done)     Description:   Instruct learner(s) on prescribed precautions during self-care and functional transfers.              Learning Progress Summary           Patient Acceptance, E,TB, VU by RAYA at 10/15/2020 1342    Comment: pt educated on long handled ae, adls and safety with functional transfers and balance    Acceptance, E, NR by EN at 10/14/2020 1139    Comment: benefits of activity, safety during transfers, ADL retraiing    Acceptance, E, VU by EN at 10/12/2020 1419    Comment: Safety during transfers, ADL retraining, PWB status    Acceptance, E, VU by EN at 10/10/2020 1055    Comment: Safety, transfers, PWB status                               User Key     Initials Effective Dates Name Provider Type Discipline    SUMAYA 07/05/20 -  Rosalba Voss OTR Occupational Therapist OT    RAYA 07/05/20 -  Ayah Madrid OTR Occupational Therapist OT                  OT Recommendation and Plan     Progress Toward Functional Goals (OT): progress toward functional goals is gradual  Plan of Care Review  Plan of Care Reviewed With: patient  Outcome Summary: OT: pt required min assist for supine to sit transfer to EOB. pt required mod assist for functional transfers from EOB and recliner chair with RW. pt performed functional mobility x 3 feet with  RW and extended time. pt with extensive tremors/shakiness which limits mobility. pt requires verbal cues throughout to maintain partial weight bearing of R LE. pt required extended time to complete all mobility and transfers. pt utilized reacher to lorenza pants from bed with min assist. pt able to doff pants off of hips in standing with min assist and RW for support.   Plan of Care Reviewed With: patient  Outcome Summary: OT: pt required min assist for supine to sit transfer to EOB. pt required mod assist for functional transfers from EOB and recliner chair with RW. pt performed functional mobility x 3 feet with RW and extended time. pt with extensive tremors/shakiness which limits mobility. pt requires verbal cues throughout to maintain partial weight bearing of R LE. pt required extended time to complete all mobility and transfers. pt utilized reacher to lorenza pants from bed with min assist. pt able to doff pants off of hips in standing with min assist and RW for support.     Outcome Measures     Row Name 10/15/20 0921 10/15/20 0920 10/14/20 0941       How much help from another person do you currently need...    Turning from your back to your side while in flat bed without using bedrails?  --  2  -JW  --    Moving from lying on back to sitting on the side of a flat bed without bedrails?  --  2  -JW  --    Moving to and from a bed to a chair (including a wheelchair)?  --  2  -JW  --    Standing up from a chair using your arms (e.g., wheelchair, bedside chair)?  --  2  -JW  --    Climbing 3-5 steps with a railing?  --  1  -JW  --    To walk in hospital room?  --  1  -JW  --    AM-PAC 6 Clicks Score (PT)  --  10  -JW  --       How much help from another is currently needed...    Putting on and taking off regular lower body clothing?  2  -JJ  --  2  -EN    Bathing (including washing, rinsing, and drying)  2  -JJ  --  2  -EN    Toileting (which includes using toilet bed pan or urinal)  2  -JJ  --  2  -EN    Putting on  and taking off regular upper body clothing  4  -JJ  --  4  -EN    Taking care of personal grooming (such as brushing teeth)  4  -JJ  --  4  -EN    Eating meals  4  -JJ  --  4  -EN    AM-PAC 6 Clicks Score (OT)  18  -JJ  --  18  -EN       Functional Assessment    Outcome Measure Options  --  AM-PAC 6 Clicks Basic Mobility (PT)  -  --    Row Name 10/12/20 1500 10/12/20 1400          How much help from another person do you currently need...    Turning from your back to your side while in flat bed without using bedrails?  2  -LH  --     Moving from lying on back to sitting on the side of a flat bed without bedrails?  2  -LH  --     Moving to and from a bed to a chair (including a wheelchair)?  2  -LH  --     Standing up from a chair using your arms (e.g., wheelchair, bedside chair)?  2  -LH  --     Climbing 3-5 steps with a railing?  1  -LH  --     To walk in hospital room?  2  -LH  --     AM-PAC 6 Clicks Score (PT)  11  -LH  --        How much help from another is currently needed...    Putting on and taking off regular lower body clothing?  --  2  -EN     Bathing (including washing, rinsing, and drying)  --  2  -EN     Toileting (which includes using toilet bed pan or urinal)  --  2  -EN     Putting on and taking off regular upper body clothing  --  4  -EN     Taking care of personal grooming (such as brushing teeth)  --  4  -EN     Eating meals  --  4  -EN     AM-PAC 6 Clicks Score (OT)  --  18  -EN        Functional Assessment    Outcome Measure Options  AM-PAC 6 Clicks Basic Mobility (PT)  -  --       User Key  (r) = Recorded By, (t) = Taken By, (c) = Cosigned By    Initials Name Provider Type     Graciela Oliveira, PT Physical Therapist    Rosalba Meraz, OTR Occupational Therapist    Ayah Shaw, OTR Occupational Therapist    Sapphire Velázquez, PT Physical Therapist          Time Calculation:   Time Calculation- OT     Row Name 10/15/20 1344             Time Calculation- OT    OT  Start Time  0920  -      OT Stop Time  0953  -      OT Time Calculation (min)  33 min  -        User Key  (r) = Recorded By, (t) = Taken By, (c) = Cosigned By    Initials Name Provider Type    Ayah Shaw, COOPER Occupational Therapist        Therapy Charges for Today     Code Description Service Date Service Provider Modifiers Qty    [id number] HC OT SELF CARE/MGMT/TRAIN EA 15 MIN 10/15/2020 Ayah Madrid OTR GO 2               COOPER Koch  10/15/2020

## 2020-10-15 NOTE — THERAPY TREATMENT NOTE
Acute Care - Physical Therapy Treatment Note   [place]     Patient Name: Simone Boykin  : 1970  MRN: [id number]  Today's Date: 10/15/2020   Onset of Illness/Injury or Date of Surgery: 10/08/20       PT Assessment (last 12 hours)      PT Evaluation and Treatment     Row Name 10/15/20 0920          Physical Therapy Time and Intention    Subjective Information  complains of;pain  -JW     Document Type  therapy note (daily note)  -     Mode of Treatment  physical therapy  -     Patient Effort  fair  -     Symptoms Noted During/After Treatment  fatigue;increased pain  -     Comment  pt requires extended time to complete all activities  -     Row Name 10/15/20 0920          General Information    Existing Precautions/Restrictions  fall;partial weight bearing  -     Limitations/Impairments  other (see comments) continued tremors throughout body with mobility  -     Row Name 10/15/20 0920          Cognition    Personal Safety Interventions  gait belt;nonskid shoes/slippers when out of bed  -     Row Name 10/15/20 0920          Pain Scale: Word Pre/Post-Treatment    Pre/Posttreatment Pain Comment  pt reports significant pain in right LE with attempted mobility  -     Row Name 10/15/20 0920          Bed Mobility    Supine-Sit [place] (Bed Mobility)  minimum assist (75% patient effort);verbal cues  -     Assistive Device (Bed Mobility)  bed rails;head of bed elevated  -     Comment (Bed Mobility)  extended time to complete  -     Row Name 10/15/20 0920          Transfers    Transfers  sit-stand transfer;stand-sit transfer  -     Sit-Stand [place] (Transfers)  moderate assist (50% patient effort);2 person assist;verbal cues  -     Stand-Sit [place] (Transfers)  minimum assist (75% patient effort);2 person assist;verbal cues  -     Row Name 10/15/20 0920          Sit-Stand Transfer    Assistive Device (Sit-Stand Transfers)  walker, front-wheeled  -     Row Name  10/15/20 0920          Stand-Sit Transfer    Assistive Device (Stand-Sit Transfers)  walker, front-wheeled  -     Row Name 10/15/20 0920          Gait/Stairs (Locomotion)    Comment (Gait/Stairs)  Patient with significant tremors throughout bilateral UEs/LEs.  Patient requires frequent cues for maintaining PWB during mobility attempts, significantly decreased gait speed.  Patient performs gait x3-4 feet before requiring return to sitting due to fatigue and decreased safety.  Patient completes subsequent stand pivot from recliner to BSC with min/md assist x2.  signficantly increased time required for direction changes.  -     Row Name             Wound 10/09/20 1700 Right lateral hip Incision    Wound - Properties Group Placement Date: 10/09/20  -ME Placement Time: 1700  -ME Side: Right  -ME Orientation: lateral  -ME Location: hip  -ME, hip and leg incisions  Primary Wound Type: Incision  -ME Additional Comments: prineo, telfa, tegaderms  -ME    Retired Wound - Properties Group Date first assessed: 10/09/20  -ME Time first assessed: 1700  -ME Side: Right  -ME Location: hip  -ME, hip and leg incisions  Primary Wound Type: Incision  -ME Additional Comments: prineo, telfa, tegaderms  -ME    Row Name 10/15/20 0920          Plan of Care Review    Plan of Care Reviewed With  patient  -BRYAN     Outcome Summary  PT: Patient requires min assist for supine to sit and mod assist x2 for sit to stand from EOB and recliner surfaces.  patient able to perform gait x3-4 feet with rolling walker with min assist, however requires frequent cues to maintain PWB status.  patient with significant tremors throughout bilateral UEs/LEs and requires increased time to complete all direction changes with decreased gait speed.  Patient not safe for further gait training or stair training at this time.  If difficulties with mobility continue, may consider use of sliding board or attempt sit pivot transfer into wheelchair.  -     Row Name  10/15/20 0920          Positioning and Restraints    Pre-Treatment Position  in bed  -JW     Post Treatment Position  bsc  -JW     On BS commode  sitting;call light within reach;encouraged to call for assist;notified nsg  -BRYAN     Row Name 10/15/20 0920          Progress Summary (PT)    Progress Toward Functional Goals (PT)  unable to show any progress toward functional goals  -     Barriers to Overall Progress (PT)  tremors, motivation for progress  -     Row Name 10/15/20 0920          Therapy Plan Review/Discharge Plan (PT)    Patient/Family Concerns, Anticipated Discharge Disposition (PT)  pt would benefit from SNF with potential for extended rehab stay while PWB, however pt may return home due to insurance issues, will defer to case management   -       User Key  (r) = Recorded By, (t) = Taken By, (c) = Cosigned By    Initials Name Provider Type    Sapphire Velázquez, PT Physical Therapist    Izabella Kebede, RN Registered Nurse        Physical Therapy Education                 Title: PT OT SLP Therapies (In Progress)     Topic: Physical Therapy (In Progress)     Point: Mobility training (In Progress)     Learning Progress Summary           Patient Acceptance, E,TB, NR by BRYAN at 10/15/2020 1315    Acceptance, E,TB, NR by BP at 10/14/2020 1104    Acceptance, E, VU by AB at 10/13/2020 0529    Acceptance, E,TB, VU by  at 10/12/2020 1540    Acceptance, E,TB, VU by  at 10/12/2020 1201    Acceptance, E, VU by LN at 10/10/2020 1256    Comment: Education on functional mobility and gait with FWW, PWB right leg.                   Point: Home exercise program (Not Started)     Learner Progress:  Not documented in this visit.          Point: Body mechanics (Done)     Learning Progress Summary           Patient Acceptance, E, VU by AB at 10/13/2020 0529    Acceptance, E, VU by LN at 10/10/2020 1256    Comment: Education on functional mobility and gait with FWW, PWB right leg.                   Point: Precautions  (In Progress)     Learning Progress Summary           Patient Acceptance, E,TB, NR by JW at 10/15/2020 1315    Acceptance, E,TB, NR by BP at 10/14/2020 1104    Acceptance, E, VU by AB at 10/13/2020 0529    Acceptance, E,TB, VU by  at 10/12/2020 1540    Acceptance, E,TB, VU by  at 10/12/2020 1201    Acceptance, E, VU by LN at 10/10/2020 1256    Comment: Education on functional mobility and gait with FWW, PWB right leg.                               User Key     Initials Effective Dates Name Provider Type Discipline     06/08/18 -  Graciela Oliveira, PT Physical Therapist PT    AB 06/16/16 -  Marina Hendrix, RN Registered Nurse Nurse    LN 08/09/20 -  Nanette Rose, PT Physical Therapist PT     04/03/18 -  Jaleel Laird, PT Physical Therapist PT     04/03/18 -  Sapphire Anthony, PT Physical Therapist PT              PT Recommendation and Plan     Progress Summary (PT)  Progress Toward Functional Goals (PT): unable to show any progress toward functional goals  Barriers to Overall Progress (PT): tremors, motivation for progress  Plan of Care Reviewed With: patient  Outcome Summary: PT: Patient requires min assist for supine to sit and mod assist x2 for sit to stand from EOB and recliner surfaces.  patient able to perform gait x3-4 feet with rolling walker with min assist, however requires frequent cues to maintain PWB status.  patient with significant tremors throughout bilateral UEs/LEs and requires increased time to complete all direction changes with decreased gait speed.  Patient not safe for further gait training or stair training at this time.  If difficulties with mobility continue, may consider use of sliding board or attempt sit pivot transfer into wheelchair.  Outcome Measures     Row Name 10/15/20 0920 10/14/20 0941 10/12/20 1500       How much help from another person do you currently need...    Turning from your back to your side while in flat bed without using bedrails?  2  -BRYAN  --   2  -LH    Moving from lying on back to sitting on the side of a flat bed without bedrails?  2  -JW  --  2  -LH    Moving to and from a bed to a chair (including a wheelchair)?  2  -JW  --  2  -LH    Standing up from a chair using your arms (e.g., wheelchair, bedside chair)?  2  -JW  --  2  -LH    Climbing 3-5 steps with a railing?  1  -JW  --  1  -LH    To walk in hospital room?  1  -  --  2  -    AM-PAC 6 Clicks Score (PT)  10  -JW  --  11  -       How much help from another is currently needed...    Putting on and taking off regular lower body clothing?  --  2  -EN  --    Bathing (including washing, rinsing, and drying)  --  2  -EN  --    Toileting (which includes using toilet bed pan or urinal)  --  2  -EN  --    Putting on and taking off regular upper body clothing  --  4  -EN  --    Taking care of personal grooming (such as brushing teeth)  --  4  -EN  --    Eating meals  --  4  -EN  --    AM-PAC 6 Clicks Score (OT)  --  18  -EN  --       Functional Assessment    Outcome Measure Options  AM-PAC 6 Clicks Basic Mobility (PT)  -  --  AM-PAC 6 Clicks Basic Mobility (PT)  -    Row Name 10/12/20 1400             How much help from another is currently needed...    Putting on and taking off regular lower body clothing?  2  -EN      Bathing (including washing, rinsing, and drying)  2  -EN      Toileting (which includes using toilet bed pan or urinal)  2  -EN      Putting on and taking off regular upper body clothing  4  -EN      Taking care of personal grooming (such as brushing teeth)  4  -EN      Eating meals  4  -EN      AM-PAC 6 Clicks Score (OT)  18  -EN        User Key  (r) = Recorded By, (t) = Taken By, (c) = Cosigned By    Initials Name Provider Type     Graciela Oliveira, PT Physical Therapist    Rosalba Meraz, OTR Occupational Therapist    Sapphire Velázquez, PT Physical Therapist           Time Calculation:   PT Charges     Row Name 10/15/20 1426             Time Calculation    Start  Time  0920  -      Stop Time  0953  -      Time Calculation (min)  33 min  -      PT Received On  10/15/20  -BRYAN      PT - Next Appointment  10/16/20  -         Timed Charges    72924 - PT Therapeutic Exercise Minutes  33  -        User Key  (r) = Recorded By, (t) = Taken By, (c) = Cosigned By    Initials Name Provider Type    Sapphire Velázquez, PT Physical Therapist        Therapy Charges for Today     Code Description Service Date Service Provider Modifiers Qty    [id number]  PT THER PROC EA 15 MIN 10/15/2020 Sapphire Anthony, PT GP 2          PT G-Codes  Outcome Measure Options: AM-PAC 6 Clicks Basic Mobility (PT)  AM-PAC 6 Clicks Score (PT): 10  AM-PAC 6 Clicks Score (OT): 18    Sapphire Anthony PT  10/15/2020

## 2020-10-15 NOTE — PROGRESS NOTES
"SERVICE: [facility] HOSPITALIST    CHIEF COMPLAINT: Short-term physical rehab placement    SUBJECTIVE:  Seen and examined having some pain in his hip this a.m. otherwise no other issues.  Review of systems is negative for fever, chills, nausea vomiting or diarrhea.    OBJECTIVE:    /71 (BP Location: Right arm, Patient Position: Lying)   Pulse 89   Temp 98.3 °F (36.8 °C) (Oral)   Resp 18   Ht 182.9 cm (72\")   Wt 113 kg (250 lb)   SpO2 95%   BMI 33.91 kg/m²     MEDS/LABS REVIEWED AND ORDERED    apixaban, 2.5 mg, Oral, Q12H  chlordiazePOXIDE, 10 mg, Oral, Q6H  folic acid, 1 mg, Oral, Daily  Magnesium Oxide, 400 mg, Oral, Daily  multivitamin with minerals, 1 tablet, Oral, Daily  pantoprazole, 40 mg, Oral, Q AM  sodium chloride, 10 mL, Intravenous, Q12H        Physical Exam  Vitals signs and nursing note reviewed.   Constitutional:       General: He is not in acute distress.     Appearance: He is obese. He is not toxic-appearing.   Eyes:      Pupils: Pupils are equal, round, and reactive to light.   Cardiovascular:      Rate and Rhythm: Normal rate and regular rhythm.      Heart sounds: No murmur.   Pulmonary:      Effort: Pulmonary effort is normal. No respiratory distress.      Breath sounds: No wheezing or rales.   Abdominal:      General: Bowel sounds are normal. There is no distension.      Tenderness: There is no abdominal tenderness. There is no guarding.   Musculoskeletal:      Right lower leg: No edema.      Left lower leg: No edema.   Neurological:      General: No focal deficit present.      Mental Status: He is alert. Mental status is at baseline.      Cranial Nerves: No cranial nerve deficit.      Comments: Left arm tremor same as yesterday   Psychiatric:         Mood and Affect: Mood normal.         Behavior: Behavior normal.         LAB/DIAGNOSTICS:    Lab Results (last 24 hours)     ** No results found for the last 24 hours. **           No radiology results for the last " day      ASSESSMENT/PLAN:    Etoh WD w/ recent seizure  -CIWA scores have remained for mostly due to his tremor, but the patient states is baseline  -Seizure happened just prior to recent admission at [place], their neurologist did not discharge him with antiseizure medication  -currently on a scheduled Librium wean, to need to finish the Librium taper  -Medically stable for discharge to physical rehab     Right intertrochanteric comminuted femur fracture  -status post right hip IM nailing on 10/9/2020  -Ortho is following per their recs     Acute blood loss anemia on chronic macrocytic anemia  -2 units of blood on 10/11, hemoglobin has since been stable  -Consider repeat hemoglobin in the next few days     C. difficile colitis  -Found on recent admission at [facility], continue oral vancomycin, and spore precautions     Reflux esophagitis  -Patient is currently on famotidine, but GI recommended daily PPI changed yesterday    DISPO: Patient is Medicaid pending, still not steady for home and case management working on possible home health versus self-pay rehab

## 2020-10-15 NOTE — PLAN OF CARE
Problem: Adult Inpatient Plan of Care  Goal: Plan of Care Review  Recent Flowsheet Documentation  Taken 10/15/2020 0921 by Ayah Madrid OTR  Plan of Care Reviewed With: patient  Outcome Summary: OT: pt required min assist for supine to sit transfer to EOB. pt required mod assist for functional transfers from EOB and recliner chair with RW. pt performed functional mobility x 3 feet with RW and extended time. pt with extensive tremors/shakiness which limits mobility. pt requires verbal cues throughout to maintain partial weight bearing of R LE. pt required extended time to complete all mobility and transfers. pt utilized reacher to lorenza pants from bed with min assist. pt able to doff pants off of hips in standing with min assist and RW for support.

## 2020-10-15 NOTE — PLAN OF CARE
Goal Outcome Evaluation:  Plan of Care Reviewed With: patient  Progress: no change  Outcome Summary: Up to chair. Po pain meds. toleraing diet. Needs encouragement to ambulate.

## 2020-10-15 NOTE — PLAN OF CARE
Problem: Adult Inpatient Plan of Care  Goal: Plan of Care Review  Recent Flowsheet Documentation  Taken 10/15/2020 0920 by Sapphire Anthony, ADY  Plan of Care Reviewed With: patient  Outcome Summary: PT: Patient requires min assist for supine to sit and mod assist x2 for sit to stand from EOB and recliner surfaces.  patient able to perform gait x3-4 feet with rolling walker with min assist, however requires frequent cues to maintain PWB status.  patient with significant tremors throughout bilateral UEs/LEs and requires increased time to complete all direction changes with decreased gait speed.  Patient not safe for further gait training or stair training at this time.  If difficulties with mobility continue, may consider use of sliding board or attempt sit pivot transfer into wheelchair.

## 2020-10-15 NOTE — PLAN OF CARE
Goal Outcome Evaluation:  Plan of Care Reviewed With: patient  Progress: no change  Outcome Summary: patient resting at this time, vital signs stable, pain controlled by med, patient ambulated to the bathroom with assistant, cont to monitor

## 2020-10-16 PROCEDURE — 97110 THERAPEUTIC EXERCISES: CPT

## 2020-10-16 PROCEDURE — 97535 SELF CARE MNGMENT TRAINING: CPT

## 2020-10-16 PROCEDURE — 99233 SBSQ HOSP IP/OBS HIGH 50: CPT | Performed by: HOSPITALIST

## 2020-10-16 RX ADMIN — OXYCODONE HYDROCHLORIDE AND ACETAMINOPHEN 2 TABLET: 7.5; 325 TABLET ORAL at 16:41

## 2020-10-16 RX ADMIN — APIXABAN 2.5 MG: 2.5 TABLET, FILM COATED ORAL at 20:55

## 2020-10-16 RX ADMIN — OXYCODONE HYDROCHLORIDE AND ACETAMINOPHEN 2 TABLET: 7.5; 325 TABLET ORAL at 00:15

## 2020-10-16 RX ADMIN — FOLIC ACID 1 MG: 1 TABLET ORAL at 08:48

## 2020-10-16 RX ADMIN — SODIUM CHLORIDE, PRESERVATIVE FREE 10 ML: 5 INJECTION INTRAVENOUS at 08:49

## 2020-10-16 RX ADMIN — MULTIPLE VITAMINS W/ MINERALS TAB 1 TABLET: TAB at 08:50

## 2020-10-16 RX ADMIN — SODIUM CHLORIDE, PRESERVATIVE FREE 10 ML: 5 INJECTION INTRAVENOUS at 20:55

## 2020-10-16 RX ADMIN — PANTOPRAZOLE SODIUM 40 MG: 40 TABLET, DELAYED RELEASE ORAL at 05:37

## 2020-10-16 RX ADMIN — APIXABAN 2.5 MG: 2.5 TABLET, FILM COATED ORAL at 08:48

## 2020-10-16 RX ADMIN — OXYCODONE HYDROCHLORIDE AND ACETAMINOPHEN 2 TABLET: 7.5; 325 TABLET ORAL at 12:41

## 2020-10-16 RX ADMIN — Medication 400 MG: at 08:49

## 2020-10-16 RX ADMIN — OXYCODONE HYDROCHLORIDE AND ACETAMINOPHEN 2 TABLET: 7.5; 325 TABLET ORAL at 08:49

## 2020-10-16 NOTE — PLAN OF CARE
Goal Outcome Evaluation:  Pt ambulating more, up in chair all day mobility slowly increasing,  Pain medications still taken Q4.

## 2020-10-16 NOTE — PROGRESS NOTES
"Hospitalist Team      Patient Care Team:  Liza Sapp PA-C as PCP - General (Family Medicine)        Chief Complaint:   Right hip fx with nailing    Subjective    Interval History and ROS:     Patient States Patient was not happy.  Complaining that he never sees anyone but then tells me that the ortho does come in every day but does not talk to him.  I encouraged him to make notes of his questions so he would remember them and ask his questions when ortho comes in tomorrow.  Hoping for placement and discharge tomorrow.  Patient Complaints: tremors which he insists are from old rugby injuries to hie shoulders bilaterally and not associated with alcohol.  States he is not and never has been an alcoholic.  Then tells me he did drink heavily for about 2 months when his Dad   Patient Denies:  Nausea or vomiting or abdominal pain  History taken from: patient chart RN      Objective    Vital Signs  Temp:  [98 °F (36.7 °C)-99.1 °F (37.3 °C)] 98.9 °F (37.2 °C)  Heart Rate:  [] 104  Resp:  [20] 20  BP: (102-123)/(56-77) 107/62  Oxygen Therapy  SpO2: 97 %  Pulse Oximetry Type: Intermittent  Device (Oxygen Therapy): room air  Device (Oxygen Therapy): room air  Flow (L/min): 3  Oxygen Concentration (%): 33  ETCO2 (mmHg): (on standby)  $ ETCO2 Daily Assessment (RT Only): yes}  Flowsheet Rows      First Filed Value   Admission Height  182 cm (71.65\") Documented at 10/08/2020 1321   Admission Weight  113 kg (250 lb) Documented at 10/08/2020 1321          Body mass index is 33.91 kg/m².  He is obese    Physical Exam:    Physical Exam  Vitals signs and nursing note reviewed.   Constitutional:       Comments: Patient was not happy.  Complaining that he never sees anyone but then tells me that the ortho does come in every day but does not talk to him.  I encouraged him to make notes of his questions so he would remember them and ask his questions when ortho comes in tomorrow.  Hoping for placement and discharge " tomorrow.   Cardiovascular:      Rate and Rhythm: Normal rate and regular rhythm.   Pulmonary:      Effort: Pulmonary effort is normal.      Breath sounds: Normal breath sounds.   Skin:     General: Skin is warm and dry.   Psychiatric:      Comments: Mood is somewhat angry and behavior is paranoid like to some extent.     Patient is also concerned that no one showed him the xrays and he does not know what they put in his right hip    Results Review:     I reviewed the patient's new clinical results.    Lab Results (last 24 hours)     ** No results found for the last 24 hours. **          Imaging Results (Last 24 Hours)     ** No results found for the last 24 hours. **          Xray not reviewed personally by physician.      ECG not reviewed personally by physician  ECG/EMG Results (most recent)     Procedure Component Value Units Date/Time    ECG 12 Lead [[id number]] Collected: 10/08/20 1504     Updated: 10/09/20 1552    Narrative:      HEART RATE= 104  bpm  RR Interval= 576  ms  SC Interval= 195  ms  P Horizontal Axis= 33  deg  P Front Axis= 68  deg  QRSD Interval= 97  ms  QT Interval= 345  ms  QRS Axis= 45  deg  T Wave Axis= 25  deg  - OTHERWISE NORMAL ECG -  Sinus tachycardia  Ventricular premature complex  No change from prior tracing  Electronically Signed By: Rochelle Damico ([facility]) 09-Oct-2020 15:33:51  Date and Time of Study: 2020-10-08 15:04:36          Medication Review:   I have reviewed the patient's current medication list    Current Facility-Administered Medications:   •  apixaban (ELIQUIS) tablet 2.5 mg, 2.5 mg, Oral, Q12H, Adalbetro Solo MD, 2.5 mg at 10/15/20 2120  •  bisacodyl (DULCOLAX) EC tablet 5 mg, 5 mg, Oral, Daily PRN, Adalberto Solo MD  •  bisacodyl (DULCOLAX) suppository 10 mg, 10 mg, Rectal, Daily PRN, Adalberto Solo MD  •  folic acid (FOLVITE) tablet 1 mg, 1 mg, Oral, Daily, Adalberto Solo MD, 1 mg at 10/15/20 0920  •  magnesium hydroxide (MILK OF MAGNESIA) suspension  2400 mg/10mL 10 mL, 10 mL, Oral, Daily PRN, Adalberto Solo MD, 10 mL at 10/13/20 1035  •  Magnesium Oxide tablet 400 mg, 400 mg, Oral, Daily, Adalberto Solo MD, 400 mg at 10/15/20 0920  •  melatonin tablet 5 mg, 5 mg, Oral, Nightly PRN, Adalberto Solo MD  •  multivitamin with minerals 1 tablet, 1 tablet, Oral, Daily, Adalberto Solo MD, 1 tablet at 10/15/20 0920  •  [] morphine injection 4 mg, 4 mg, Intravenous, Q4H PRN, 4 mg at 10/09/20 0916 **AND** naloxone (NARCAN) injection 0.4 mg, 0.4 mg, Intravenous, Q5 Min PRN, Adalberto Solo MD  •  nitroglycerin (NITROSTAT) SL tablet 0.4 mg, 0.4 mg, Sublingual, Q5 Min PRN, Adalberto Solo MD  •  ondansetron (ZOFRAN) tablet 4 mg, 4 mg, Oral, Q6H PRN **OR** ondansetron (ZOFRAN) injection 4 mg, 4 mg, Intravenous, Q6H PRN, Adalberto Solo MD  •  ondansetron (ZOFRAN) tablet 4 mg, 4 mg, Oral, Q6H PRN **OR** ondansetron (ZOFRAN) injection 4 mg, 4 mg, Intravenous, Q6H PRN, Adalberto Solo MD  •  oxyCODONE-acetaminophen (PERCOCET) 7.5-325 MG per tablet 1 tablet, 1 tablet, Oral, Q4H PRN, Adalberto Solo MD, 1 tablet at 10/15/20 0752  •  oxyCODONE-acetaminophen (PERCOCET) 7.5-325 MG per tablet 2 tablet, 2 tablet, Oral, Q4H PRN, Adalberto Solo MD, 2 tablet at 10/16/20 0015  •  pantoprazole (PROTONIX) EC tablet 40 mg, 40 mg, Oral, Q AM, Jhoan Lazcano MD, 40 mg at 10/16/20 0537  •  [COMPLETED] Insert peripheral IV, , , Once **AND** sodium chloride 0.9 % flush 10 mL, 10 mL, Intravenous, PRN, Adalberto Solo MD  •  sodium chloride 0.9 % flush 10 mL, 10 mL, Intravenous, PRN, Adalberto Solo MD  •  sodium chloride 0.9 % flush 10 mL, 10 mL, Intravenous, Q12H, Adalberto Solo MD, 10 mL at 10/15/20 2120  •  sodium chloride 0.9 % infusion 40 mL, 40 mL, Intravenous, PRN, Adalberto Solo MD      Assessment/Plan     Right hip fracture and surgery done/ S/P right hip nail    Alcohol abuse with recent admission to [place]  hospital and seizure activity   Monitoring CIWA    Chronic pancytopenia / macrocytic anemia with coagulopathy    Recent ABLA and GIB    C diff infection   On oral vancomycin    Fatty liver disease    Dysphagia    Obesity/ Body mass index is 33.91 kg/m².                Plan for disposition:      Radha Daugherty DO  10/16/20  06:55 EDT      Time: 45 min/ My time was spent mostly answering questions and trying to help patient calm down.  He wanted to tell me many stories about past medical history today.

## 2020-10-16 NOTE — PLAN OF CARE
Goal Outcome Evaluation:  Plan of Care Reviewed With: patient  Progress: no change  Outcome Summary: Encoraged ambulatin, patient refused.  Pain well controlled with PRN pain medicaion requested q4 ours while awake.  VSS, safety protocols in place

## 2020-10-16 NOTE — THERAPY TREATMENT NOTE
Acute Care - Occupational Therapy Treatment Note   Noris Ballesteros     Patient Name: Simone Boykin  : 1970  MRN: [id number]  Today's Date: 10/16/2020  Onset of Illness/Injury or Date of Surgery: 10/08/20  Date of Referral to OT: 10/09/20  Referring Physician: Dr. Solo    Admit Date: 10/8/2020       ICD-10-CM ICD-9-CM   1. Closed comminuted intertrochanteric fracture of right femur, initial encounter (CMS/[facility])  S72.141A 820.21   2. History of alcohol abuse  F10.11 305.03   3. Clostridioides difficile infection  A49.8 041.84   4. Status post fracture of right hip  Z87.81 V15.51     Patient Active Problem List   Diagnosis   • Neck mass   • Anxiety   • Pain of left lower extremity   • Swelling of lower leg   • Hematoma   • Closed comminuted intertrochanteric fracture of proximal end of right femur (CMS/[facility])     Past Medical History:   Diagnosis Date   • Anxiety    • Arthritis     JUAN KNEES   • Depression    • Fall    • GERD (gastroesophageal reflux disease)    • Heartburn     ON OCC   • Mass     BASE OF POSTERIOR NECK   • Shoulder dislocation     LONG TERM ISSUES WITH     Past Surgical History:   Procedure Laterality Date   • EXCISION MASS HEAD/NECK N/A 2016    Procedure: MASS SOFT TISSUE EXCISION POSTERIOR NECK;  Surgeon: Crow Ayala Jr., MD;  Location: [facility] OR;  Service:    • KNEE ARTHROSCOPY Right           OT ASSESSMENT FLOWSHEET (last 12 hours)      OT Evaluation and Treatment     Row Name 10/16/20 0941                   OT Time and Intention    Subjective Information  complains of;pain  -JJ        Document Type  therapy note (daily note)  -JJ        Mode of Treatment  occupational therapy  -JJ        Patient Effort  adequate  -JJ        Symptoms Noted During/After Treatment  increased pain  -JJ        Comment  pt required extedned time to complete activities  -JJ           General Information    Existing Precautions/Restrictions  fall;partial weight bearing  -JJ           Cognition     Personal Safety Interventions  gait belt;nonskid shoes/slippers when out of bed  -JJ           Pain Scale: Numbers Pre/Post-Treatment    Pre/Posttreatment Pain Comment  pt co signficant pain in R LE after mobility, did not rate  -JJ        Pain Intervention(s)  Repositioned  -JJ           Mobility    Extremity Weight-bearing Status  right lower extremity  -JJ        Right Lower Extremity (Weight-bearing Status)  partial weight-bearing (PWB)  -JJ           Bed Mobility    Comment (Bed Mobility)  deferred up in chair  -JJ           Transfer Assessment/Treatment    Comment (Transfers)  pt required verbal cues for hand placement. pt performed stand pivot transfers from chair to bed and bed to chair. pt required increased time when moving toward R LE.  -JJ           Transfers    Sit-Stand [place] (Transfers)  minimum assist (75% patient effort);verbal cues;2 person assist  -JJ        Stand-Sit [place] (Transfers)  minimum assist (75% patient effort);verbal cues  -JJ           Sit-Stand Transfer    Assistive Device (Sit-Stand Transfers)  walker, front-wheeled  -JJ           Stand-Sit Transfer    Assistive Device (Stand-Sit Transfers)  walker, front-wheeled  -JJ           Stand Pivot/Stand Step Transfer    Stand Pivot/Stand Step [place] (Transfers)  contact guard;minimum assist (75% patient effort);2 person assist;verbal cues  -JJ        Assistive Device (Stand Pivot Stand Step Transfer)  walker, front-wheeled  -JJ           Safety Issues, Functional Mobility    Safety Issues Affecting Function (Mobility)  awareness of need for assistance;judgment;problem-solving;safety precautions follow-through/compliance  -JJ        Comment, Safety Issues/Impairments (Mobility)  pt unaware of amount of assist required for transfers and mobility  -JJ           Wound 10/09/20 1700 Right lateral hip Incision    Wound - Properties Group Placement Date: 10/09/20  -ME Placement Time: 1700  -ME Side: Right  -ME Orientation:  lateral  -ME Location: hip  -ME, hip and leg incisions  Primary Wound Type: Incision  -ME Additional Comments: prineo, telfa, tegaderms  -ME    Retired Wound - Properties Group Date first assessed: 10/09/20  -ME Time first assessed: 1700  -ME Side: Right  -ME Location: hip  -ME, hip and leg incisions  Primary Wound Type: Incision  -ME Additional Comments: prineo, telfa, tegaderms  -ME       Plan of Care Review    Plan of Care Reviewed With  patient  -JJ        Outcome Summary  OT: pt performs sit to stnad transfers from EOB and recliner chair with min assist x 2 with RW. pt CGA-min assist for stand pivot transfers with RW requieing increased time and assist when transferring toward surgical leg (R LE). pt would benefit from w/c and BSC. pt has had minimal progress with therapy over past week 2 to pain, tremors and decreased safety awareness. pt would benefit from ECF/SNF where pt has ability to stay until WB status is lifted.  -JJ           Positioning and Restraints    Pre-Treatment Position  sitting in chair/recliner  -JJ        Post Treatment Position  chair  -JJ        In Chair  reclined;call light within reach;encouraged to call for assist;notified nsg  -JJ           Progress Summary (OT)    Progress Toward Functional Goals (OT)  progress toward functional goals is gradual  -JJ        Daily Progress Summary (OT)  cont poc  -JJ          User Key  (r) = Recorded By, (t) = Taken By, (c) = Cosigned By    Initials Name Effective Dates    Ayah Shaw OTR 07/05/20 -     ME Izabella Lyons RN 04/10/20 -          Occupational Therapy Education                 Title: PT OT SLP Therapies (In Progress)     Topic: Occupational Therapy (In Progress)     Point: ADL training (In Progress)     Description:   Instruct learner(s) on proper safety adaptation and remediation techniques during self care or transfers.   Instruct in proper use of assistive devices.              Learning Progress Summary            Patient Acceptance, E, NR by EN at 10/14/2020 1139    Comment: benefits of activity, safety during transfers, ADL retraiing    Acceptance, E, VU by EN at 10/12/2020 1419    Comment: Safety during transfers, ADL retraining, PWB status    Acceptance, E, VU by EN at 10/10/2020 1055    Comment: Safety, transfers, PWB status                   Point: Precautions (Done)     Description:   Instruct learner(s) on prescribed precautions during self-care and functional transfers.              Learning Progress Summary           Patient Acceptance, E,TB, VU by RAYA at 10/16/2020 1312    Comment: pt educated on safety with functional transfers and PWB of R LE    Acceptance, E,TB, VU by RAYA at 10/15/2020 1342    Comment: pt educated on long handled ae, adls and safety with functional transfers and balance    Acceptance, E, NR by EN at 10/14/2020 1139    Comment: benefits of activity, safety during transfers, ADL retraiing    Acceptance, E, VU by EN at 10/12/2020 1419    Comment: Safety during transfers, ADL retraining, PWB status    Acceptance, E, VU by EN at 10/10/2020 1055    Comment: Safety, transfers, PWB status                               User Key     Initials Effective Dates Name Provider Type Discipline    SUMAYA 07/05/20 -  Rosalba Voss OTR Occupational Therapist OT    RAYA 07/05/20 -  Ayah Madrid OTR Occupational Therapist OT                  OT Recommendation and Plan     Progress Toward Functional Goals (OT): progress toward functional goals is gradual  Plan of Care Review  Plan of Care Reviewed With: patient  Outcome Summary: OT: pt performs sit to stnad transfers from EOB and recliner chair with min assist x 2 with RW. pt CGA-min assist for stand pivot transfers with RW requieing increased time and assist when transferring toward surgical leg (R LE). pt would benefit from w/c and BSC. pt has had minimal progress with therapy over past week 2 to pain, tremors and decreased safety awareness. pt would  benefit from ECF/SNF where pt has ability to stay until WB status is lifted.  Plan of Care Reviewed With: patient  Outcome Summary: OT: pt performs sit to stnad transfers from EOB and recliner chair with min assist x 2 with RW. pt CGA-min assist for stand pivot transfers with RW requieing increased time and assist when transferring toward surgical leg (R LE). pt would benefit from w/c and BSC. pt has had minimal progress with therapy over past week 2 to pain, tremors and decreased safety awareness. pt would benefit from ECF/SNF where pt has ability to stay until WB status is lifted.    Outcome Measures     Row Name 10/16/20 0941 10/16/20 0940 10/15/20 0921       How much help from another person do you currently need...    Turning from your back to your side while in flat bed without using bedrails?  --  3  -BP  --    Moving from lying on back to sitting on the side of a flat bed without bedrails?  --  2  -BP  --    Moving to and from a bed to a chair (including a wheelchair)?  --  3  -BP  --    Standing up from a chair using your arms (e.g., wheelchair, bedside chair)?  --  3  -BP  --    Climbing 3-5 steps with a railing?  --  1  -BP  --    To walk in hospital room?  --  2  -BP  --    AM-PAC 6 Clicks Score (PT)  --  14  -BP  --       How much help from another is currently needed...    Putting on and taking off regular lower body clothing?  2  -JJ  --  2  -JJ    Bathing (including washing, rinsing, and drying)  2  -JJ  --  2  -JJ    Toileting (which includes using toilet bed pan or urinal)  2  -JJ  --  2  -JJ    Putting on and taking off regular upper body clothing  4  -JJ  --  4  -JJ    Taking care of personal grooming (such as brushing teeth)  4  -JJ  --  4  -JJ    Eating meals  4  -JJ  --  4  -JJ    AM-PAC 6 Clicks Score (OT)  18  -JJ  --  18  -JJ       Functional Assessment    Outcome Measure Options  --  AM-PAC 6 Clicks Basic Mobility (PT)  -BP  --    Row Name 10/15/20 0920 10/14/20 0941          How much help  from another person do you currently need...    Turning from your back to your side while in flat bed without using bedrails?  2  -JW  --     Moving from lying on back to sitting on the side of a flat bed without bedrails?  2  -JW  --     Moving to and from a bed to a chair (including a wheelchair)?  2  -JW  --     Standing up from a chair using your arms (e.g., wheelchair, bedside chair)?  2  -JW  --     Climbing 3-5 steps with a railing?  1  -JW  --     To walk in hospital room?  1  -JW  --     AM-PAC 6 Clicks Score (PT)  10  -JW  --        How much help from another is currently needed...    Putting on and taking off regular lower body clothing?  --  2  -EN     Bathing (including washing, rinsing, and drying)  --  2  -EN     Toileting (which includes using toilet bed pan or urinal)  --  2  -EN     Putting on and taking off regular upper body clothing  --  4  -EN     Taking care of personal grooming (such as brushing teeth)  --  4  -EN     Eating meals  --  4  -EN     AM-PAC 6 Clicks Score (OT)  --  18  -EN        Functional Assessment    Outcome Measure Options  AM-PAC 6 Clicks Basic Mobility (PT)  -JW  --       User Key  (r) = Recorded By, (t) = Taken By, (c) = Cosigned By    Initials Name Provider Type    Rosalba Meraz, OTR Occupational Therapist    Ayah Shaw, OTR Occupational Therapist    Jaleel Kim, PT Physical Therapist    Sapphire Velázquez, PT Physical Therapist          Time Calculation:   Time Calculation- OT     Row Name 10/16/20 1313             Time Calculation- OT    OT Start Time  0940  -      OT Stop Time  1000  -      OT Time Calculation (min)  20 min  -        User Key  (r) = Recorded By, (t) = Taken By, (c) = Cosigned By    Initials Name Provider Type    Ayah Shaw, OTR Occupational Therapist        Therapy Charges for Today     Code Description Service Date Service Provider Modifiers Qty    [id number]  OT SELF CARE/MGMT/TRAIN EA 15 MIN  10/15/2020 Ayah Madrid, OTR GO 2    [id number] HC OT SELF CARE/MGMT/TRAIN EA 15 MIN 10/16/2020 Ayah Madrid, OTR GO 1               Ayah Madrid, OTR  10/16/2020

## 2020-10-16 NOTE — PLAN OF CARE
Problem: Adult Inpatient Plan of Care  Goal: Plan of Care Review  Recent Flowsheet Documentation  Taken 10/16/2020 0940 by Jaleel Laird PT  Plan of Care Reviewed With: patient  Outcome Summary: PT: Patient performs sit to/from stand transfers with min A x 2 and stand pivot transfer x 2 with CGA/min A with use of FWW. Patient requires cues for safety. Recommend stand pivot transfers only at this time. Patient not safe to ambulate due to persistent UE/LE tremors and PWB status. Patient has been seen by PT QD/BID x 1 week with minimal progress. Due to slow progression and PWB status, patient would benefit from SNF with potential for extended stay. If patient returns home, recommend w/c and ramp as well as 24/7 assist. Discussed all recommendations with  and patient.

## 2020-10-16 NOTE — PROGRESS NOTES
POD# 7 s/p right hip nail    Subjective: Simone Boykin resting in chair this afternoon continues to demonstrate very slow progression with physical therapy, continues to require 2 person assist for transfers.  Pain well controlled right lower extremity continue Eliquis DVT prophylaxis negative calf tenderness, negative numbness or tingling right lower extremity.  Reports that he feels better today that he has over the last 1 week.    Objective:  Vitals:    10/15/20 2030 10/16/20 0020 10/16/20 0545 10/16/20 1549   BP: 102/56 123/77 107/62 105/62   BP Location:   Right arm Right arm   Patient Position:   Lying Sitting   Pulse: 97 101 104 96   Resp: 20 20 20 16   Temp: 98.4 °F (36.9 °C) 98 °F (36.7 °C) 98.9 °F (37.2 °C) 98.2 °F (36.8 °C)   TempSrc: Oral Oral Oral Oral   SpO2: 96% 98% 97% 95%   Weight:       Height:           Results from last 7 days   Lab Units 10/13/20  0758 10/12/20  0441 10/11/20  0424   WBC 10*3/mm3  --   --  9.00   HEMOGLOBIN g/dL 8.6* 8.5* 6.5*   HEMATOCRIT % 27.3* 27.2* 20.9*   PLATELETS 10*3/mm3  --   --  145       Results from last 7 days   Lab Units 10/13/20  0758 10/11/20  0424 10/10/20  0538   SODIUM mmol/L 136 137 134*   POTASSIUM mmol/L 3.6 4.1 5.0   CHLORIDE mmol/L 104 106 105   CO2 mmol/L 24.8 22.2 17.1*   BUN mg/dL 8 11 10   CREATININE mg/dL 0.75* 0.82 0.83   GLUCOSE mg/dL 121* 137* 195*   CALCIUM mg/dL 8.3* 8.0* 7.9*       Exam:    Right lower extremity examined  Incisions clean, dry, intact  Positive sensation light touch all distributions right lower extremity  Moderate swelling ankle, he states that this is baseline secondary to prior injuries to the ankle  Flex extend all digits right foot, brisk cap refill all digits  2+ dorsalis pedis pulse  All compartments soft and easily compressible  Negative calf tenderness, negative Homans sign    Impression: s/p right hip nail    Plan:  1. PT/OT- ambulate, partial weightbearing right lower extremity  2. Pain control-Percocet  3. DVT  prophylaxis-Eliquis twice daily for total 4 weeks postop  4. Wound care-Dermabond remain intact until follow-up in office  5. Disposition-as of 10/16/2020 awaiting placement short-term rehab for gait training, strengthening range of motion.  Disposition per hospitalist we will follow-up in office 2 weeks postop with x-ray imaging right hip.

## 2020-10-16 NOTE — THERAPY TREATMENT NOTE
Acute Care - Physical Therapy Treatment Note   [place]     Patient Name: Simone Boykin  : 1970  MRN: [id number]  Today's Date: 10/16/2020   Onset of Illness/Injury or Date of Surgery: 10/08/20       PT Assessment (last 12 hours)      PT Evaluation and Treatment     Row Name 10/16/20 0940          Physical Therapy Time and Intention    Subjective Information  complains of;pain  -BP     Document Type  therapy note (daily note)  -BP     Mode of Treatment  physical therapy  -BP     Patient Effort  adequate  -BP     Symptoms Noted During/After Treatment  increased pain  -BP     Row Name 10/16/20 0940          General Information    Patient/Family/Caregiver Comments/Observations  Reclined in bedside chair. Agreeable to PT treatment   -BP     Existing Precautions/Restrictions  fall;partial weight bearing  -BP     Row Name 10/16/20 0940          Cognition    Personal Safety Interventions  gait belt;nonskid shoes/slippers when out of bed  -BP     Row Name 10/16/20 0940          Pain Scale: Numbers Pre/Post-Treatment    Pre/Posttreatment Pain Comment  Patient complains of increased pain following mobility but does not rate   -BP     Row Name 10/16/20 0940          Bed Mobility    Comment (Bed Mobility)  deferred, patient up in chair.   -BP     Row Name 10/16/20 0940          Transfers    Transfers  sit-stand transfer;stand-sit transfer;stand pivot/stand step transfer  -BP     Comment (Transfers)  Verbal cues for hand placement  -BP     Sit-Stand [place] (Transfers)  minimum assist (75% patient effort);verbal cues;2 person assist  -BP     Stand-Sit [place] (Transfers)  minimum assist (75% patient effort);verbal cues  -BP     Row Name 10/16/20 0940          Sit-Stand Transfer    Assistive Device (Sit-Stand Transfers)  walker, front-wheeled  -BP     Row Name 10/16/20 0940          Stand-Sit Transfer    Assistive Device (Stand-Sit Transfers)  walker, front-wheeled  -BP     Row Name 10/16/20 0940           Stand Pivot/Stand Step Transfer    Stand Pivot/Stand Step [place] (Transfers)  contact guard;minimum assist (75% patient effort);verbal cues;2 person assist  -BP     Assistive Device (Stand Pivot Stand Step Transfer)  walker, front-wheeled  -BP     Row Name 10/16/20 0940          Gait/Stairs (Locomotion)    Comment (Gait/Stairs)  transfers only   -BP     Row Name 10/16/20 0940          Safety Issues, Functional Mobility    Safety Issues Affecting Function (Mobility)  problem-solving;insight into deficits/self-awareness;safety precaution awareness;safety precautions follow-through/compliance;positioning of assistive device  -BP     Comment, Safety Issues/Impairments (Mobility)  Patient unaware of amount of assist required for mobility. Requires cues for safety   -BP     Row Name             Wound 10/09/20 1700 Right lateral hip Incision    Wound - Properties Group Placement Date: 10/09/20  -ME Placement Time: 1700  -ME Side: Right  -ME Orientation: lateral  -ME Location: hip  -ME, hip and leg incisions  Primary Wound Type: Incision  -ME Additional Comments: prineo, telfa, tegaderms  -ME    Retired Wound - Properties Group Date first assessed: 10/09/20  -ME Time first assessed: 1700  -ME Side: Right  -ME Location: hip  -ME, hip and leg incisions  Primary Wound Type: Incision  -ME Additional Comments: prineo, telfa, tegaderms  -ME    Row Name 10/16/20 0940          Plan of Care Review    Plan of Care Reviewed With  patient  -BP     Outcome Summary  PT: Patient performs sit to/from stand transfers with min A x 2 and stand pivot transfer x 2 with CGA/min A with use of FWW. Patient requires cues for safety. Recommend stand pivot transfers only at this time. Patient not safe to ambulate due to persistent UE/LE tremors and PWB status. Patient has been seen by PT QD/BID x 1 week with minimal progress. Due to slow progression and PWB status, patient would benefit from SNF with potential for extended stay. If patient  returns home, recommend w/c and ramp as well as 24/7 assist. Discussed all recommendations with  and patient.   -BP     Row Name 10/16/20 0940          Positioning and Restraints    Pre-Treatment Position  sitting in chair/recliner  -BP     Post Treatment Position  chair  -BP     In Chair  reclined;call light within reach;encouraged to call for assist;notified nsg  -BP     Row Name 10/16/20 0940          Progress Summary (PT)    Progress Toward Functional Goals (PT)  progress toward functional goals is fair  -BP     Row Name 10/16/20 0940          Therapy Plan Review/Discharge Plan (PT)    Patient/Family Concerns, Anticipated Discharge Disposition (PT)  Discharge unknown. If patient returns home, recommend w/c and ramp as well as 24/7 care. Recommend SNF with extended stay due to lack of progress with therapy, PWB status as well as persistent tremors.   -BP       User Key  (r) = Recorded By, (t) = Taken By, (c) = Cosigned By    Initials Name Provider Type    BP Jaleel Laird, PT Physical Therapist    ME Izabella Lyons RN Registered Nurse        Physical Therapy Education                 Title: PT OT SLP Therapies (In Progress)     Topic: Physical Therapy (In Progress)     Point: Mobility training (In Progress)     Learning Progress Summary           Patient Acceptance, E,TB, NR by  at 10/16/2020 1016    Acceptance, E,TB, NR by  at 10/15/2020 1315    Acceptance, E,TB, NR by  at 10/14/2020 1104    Acceptance, E, VU by AB at 10/13/2020 0529    Acceptance, E,TB, VU by  at 10/12/2020 1540    Acceptance, E,TB, VU by  at 10/12/2020 1201    Acceptance, E, VU by LN at 10/10/2020 1256    Comment: Education on functional mobility and gait with FWW, PWB right leg.                   Point: Home exercise program (Not Started)     Learner Progress:  Not documented in this visit.          Point: Body mechanics (Done)     Learning Progress Summary           Patient Acceptance, E, VU by AB at 10/13/2020  0529    Acceptance, E, VU by LN at 10/10/2020 1256    Comment: Education on functional mobility and gait with FWW, PWB right leg.                   Point: Precautions (In Progress)     Learning Progress Summary           Patient Acceptance, E,TB, NR by  at 10/16/2020 1016    Acceptance, E,TB, NR by JW at 10/15/2020 1315    Acceptance, E,TB, NR by BP at 10/14/2020 1104    Acceptance, E, VU by AB at 10/13/2020 0529    Acceptance, E,TB, VU by  at 10/12/2020 1540    Acceptance, E,TB, VU by  at 10/12/2020 1201    Acceptance, E, VU by LN at 10/10/2020 1256    Comment: Education on functional mobility and gait with FWW, PWB right leg.                               User Key     Initials Effective Dates Name Provider Type Discipline     06/08/18 -  Graciela Oliveira, PT Physical Therapist PT    AB 06/16/16 -  Marina Hendrix, RN Registered Nurse Nurse    LN 08/09/20 -  Nanette Rose, PT Physical Therapist PT     04/03/18 -  Jaleel Laird, PT Physical Therapist PT     04/03/18 -  Sapphire Anthony, PT Physical Therapist PT              PT Recommendation and Plan  Anticipated Discharge Disposition (PT): skilled nursing facility, extended care facility  Therapy Frequency (PT): daily  Progress Summary (PT)  Progress Toward Functional Goals (PT): progress toward functional goals is fair  Barriers to Overall Progress (PT): Tremors and overall motivation to participate   Plan of Care Reviewed With: patient  Outcome Summary: PT: Patient performs sit to/from stand transfers with min A x 2 and stand pivot transfer x 2 with CGA/min A with use of FWW. Patient requires cues for safety. Recommend stand pivot transfers only at this time. Patient not safe to ambulate due to persistent UE/LE tremors and PWB status. Patient has been seen by PT QD/BID x 1 week with minimal progress. Due to slow progression and PWB status, patient would benefit from SNF with potential for extended stay. If patient returns home,  recommend w/c and ramp as well as 24/7 assist. Discussed all recommendations with  and patient.   Outcome Measures     Row Name 10/16/20 0940 10/15/20 0921 10/15/20 0920       How much help from another person do you currently need...    Turning from your back to your side while in flat bed without using bedrails?  3  -BP  --  2  -JW    Moving from lying on back to sitting on the side of a flat bed without bedrails?  2  -BP  --  2  -JW    Moving to and from a bed to a chair (including a wheelchair)?  3  -BP  --  2  -JW    Standing up from a chair using your arms (e.g., wheelchair, bedside chair)?  3  -BP  --  2  -JW    Climbing 3-5 steps with a railing?  1  -BP  --  1  -JW    To walk in hospital room?  2  -BP  --  1  -JW    AM-PAC 6 Clicks Score (PT)  14  -BP  --  10  -JW       How much help from another is currently needed...    Putting on and taking off regular lower body clothing?  --  2  -JJ  --    Bathing (including washing, rinsing, and drying)  --  2  -JJ  --    Toileting (which includes using toilet bed pan or urinal)  --  2  -JJ  --    Putting on and taking off regular upper body clothing  --  4  -JJ  --    Taking care of personal grooming (such as brushing teeth)  --  4  -JJ  --    Eating meals  --  4  -JJ  --    AM-PAC 6 Clicks Score (OT)  --  18  -JJ  --       Functional Assessment    Outcome Measure Options  AM-PAC 6 Clicks Basic Mobility (PT)  -BP  --  AM-PAC 6 Clicks Basic Mobility (PT)  -JW    Row Name 10/14/20 0941             How much help from another is currently needed...    Putting on and taking off regular lower body clothing?  2  -EN      Bathing (including washing, rinsing, and drying)  2  -EN      Toileting (which includes using toilet bed pan or urinal)  2  -EN      Putting on and taking off regular upper body clothing  4  -EN      Taking care of personal grooming (such as brushing teeth)  4  -EN      Eating meals  4  -EN      AM-PAC 6 Clicks Score (OT)  18  -EN        User Key   (r) = Recorded By, (t) = Taken By, (c) = Cosigned By    Initials Name Provider Type    Rosalba Meraz, OTR Occupational Therapist    Ayah Shaw, OTR Occupational Therapist    Jaleel Kim, PT Physical Therapist    Sapphire Velázquez, PT Physical Therapist           Time Calculation:   PT Charges     Row Name 10/16/20 1017             Time Calculation    Start Time  0940  -BP      Stop Time  1000  -BP      Time Calculation (min)  20 min  -BP      PT Received On  10/16/20  -BP      PT - Next Appointment  10/17/20  -BP         Timed Charges    08771 - PT Therapeutic Exercise Minutes  20  -BP        User Key  (r) = Recorded By, (t) = Taken By, (c) = Cosigned By    Initials Name Provider Type    Jaleel Kim, PT Physical Therapist        Therapy Charges for Today     Code Description Service Date Service Provider Modifiers Qty    40565764769 HC PT THER PROC EA 15 MIN 10/16/2020 Jaleel Laird, PT GP 1          PT G-Codes  Outcome Measure Options: AM-PAC 6 Clicks Basic Mobility (PT)  AM-PAC 6 Clicks Score (PT): 14  AM-PAC 6 Clicks Score (OT): 18    Jaleel Laird PT  10/16/2020

## 2020-10-16 NOTE — PLAN OF CARE
Problem: Adult Inpatient Plan of Care  Goal: Plan of Care Review  Recent Flowsheet Documentation  Taken 10/16/2020 0941 by Ayah Madrid OTR  Plan of Care Reviewed With: patient  Outcome Summary: OT: pt performs sit to stnad transfers from EOB and recliner chair with min assist x 2 with RW. pt CGA-min assist for stand pivot transfers with RW requieing increased time and assist when transferring toward surgical leg (R LE). pt would benefit from w/c and BSC. pt has had minimal progress with therapy over past week 2 to pain, tremors and decreased safety awareness. pt would benefit from ECF/SNF where pt has ability to stay until WB status is lifted.

## 2020-10-16 NOTE — NURSING NOTE
Continued Stay Note  SUSANNA Smith     Patient Name: Simone Boykin  MRN: [id number]  Today's Date: 10/16/2020    Admit Date: 10/8/2020    Discharge Plan     Row Name 10/16/20 1159       Plan    Plan Comments  Referral called to [facility] since patient now has full Medicaid.  They will eval for admission. Will continue to follow        Discharge Codes    No documentation.             Luiza Marr RN

## 2020-10-17 LAB
ANION GAP SERPL CALCULATED.3IONS-SCNC: 11 MMOL/L (ref 5–15)
BASOPHILS # BLD AUTO: 0.05 10*3/MM3 (ref 0–0.2)
BASOPHILS NFR BLD AUTO: 0.8 % (ref 0–1.5)
BUN SERPL-MCNC: 8 MG/DL (ref 6–20)
BUN/CREAT SERPL: 10.8 (ref 7–25)
CALCIUM SPEC-SCNC: 8.7 MG/DL (ref 8.6–10.5)
CHLORIDE SERPL-SCNC: 101 MMOL/L (ref 98–107)
CO2 SERPL-SCNC: 22 MMOL/L (ref 22–29)
CREAT SERPL-MCNC: 0.74 MG/DL (ref 0.76–1.27)
DEPRECATED RDW RBC AUTO: 57.2 FL (ref 37–54)
EOSINOPHIL # BLD AUTO: 0.37 10*3/MM3 (ref 0–0.4)
EOSINOPHIL NFR BLD AUTO: 5.7 % (ref 0.3–6.2)
ERYTHROCYTE [DISTWIDTH] IN BLOOD BY AUTOMATED COUNT: 15.2 % (ref 12.3–15.4)
GFR SERPL CREATININE-BSD FRML MDRD: 112 ML/MIN/1.73
GLUCOSE BLDC GLUCOMTR-MCNC: 86 MG/DL (ref 70–130)
GLUCOSE SERPL-MCNC: 116 MG/DL (ref 65–99)
HCT VFR BLD AUTO: 32.2 % (ref 37.5–51)
HGB BLD-MCNC: 9.7 G/DL (ref 13–17.7)
IMM GRANULOCYTES # BLD AUTO: 0.11 10*3/MM3 (ref 0–0.05)
IMM GRANULOCYTES NFR BLD AUTO: 1.7 % (ref 0–0.5)
LYMPHOCYTES # BLD AUTO: 1.24 10*3/MM3 (ref 0.7–3.1)
LYMPHOCYTES NFR BLD AUTO: 19.2 % (ref 19.6–45.3)
MCH RBC QN AUTO: 30.7 PG (ref 26.6–33)
MCHC RBC AUTO-ENTMCNC: 30.1 G/DL (ref 31.5–35.7)
MCV RBC AUTO: 101.9 FL (ref 79–97)
MONOCYTES # BLD AUTO: 0.77 10*3/MM3 (ref 0.1–0.9)
MONOCYTES NFR BLD AUTO: 11.9 % (ref 5–12)
NEUTROPHILS NFR BLD AUTO: 3.91 10*3/MM3 (ref 1.7–7)
NEUTROPHILS NFR BLD AUTO: 60.7 % (ref 42.7–76)
NRBC BLD AUTO-RTO: 0 /100 WBC (ref 0–0.2)
PLATELET # BLD AUTO: 175 10*3/MM3 (ref 140–450)
PMV BLD AUTO: 10.9 FL (ref 6–12)
POTASSIUM SERPL-SCNC: 4.7 MMOL/L (ref 3.5–5.2)
RBC # BLD AUTO: 3.16 10*6/MM3 (ref 4.14–5.8)
SODIUM SERPL-SCNC: 134 MMOL/L (ref 136–145)
WBC # BLD AUTO: 6.45 10*3/MM3 (ref 3.4–10.8)

## 2020-10-17 PROCEDURE — 80048 BASIC METABOLIC PNL TOTAL CA: CPT | Performed by: HOSPITALIST

## 2020-10-17 PROCEDURE — 82962 GLUCOSE BLOOD TEST: CPT

## 2020-10-17 PROCEDURE — 85025 COMPLETE CBC W/AUTO DIFF WBC: CPT | Performed by: HOSPITALIST

## 2020-10-17 PROCEDURE — 99231 SBSQ HOSP IP/OBS SF/LOW 25: CPT | Performed by: HOSPITALIST

## 2020-10-17 PROCEDURE — 97110 THERAPEUTIC EXERCISES: CPT

## 2020-10-17 RX ADMIN — OXYCODONE HYDROCHLORIDE AND ACETAMINOPHEN 2 TABLET: 7.5; 325 TABLET ORAL at 21:16

## 2020-10-17 RX ADMIN — OXYCODONE HYDROCHLORIDE AND ACETAMINOPHEN 2 TABLET: 7.5; 325 TABLET ORAL at 02:15

## 2020-10-17 RX ADMIN — FOLIC ACID 1 MG: 1 TABLET ORAL at 08:42

## 2020-10-17 RX ADMIN — OXYCODONE HYDROCHLORIDE AND ACETAMINOPHEN 2 TABLET: 7.5; 325 TABLET ORAL at 08:47

## 2020-10-17 RX ADMIN — SODIUM CHLORIDE, PRESERVATIVE FREE 10 ML: 5 INJECTION INTRAVENOUS at 08:43

## 2020-10-17 RX ADMIN — Medication 400 MG: at 08:42

## 2020-10-17 RX ADMIN — MULTIPLE VITAMINS W/ MINERALS TAB 1 TABLET: TAB at 08:42

## 2020-10-17 RX ADMIN — APIXABAN 2.5 MG: 2.5 TABLET, FILM COATED ORAL at 20:55

## 2020-10-17 RX ADMIN — APIXABAN 2.5 MG: 2.5 TABLET, FILM COATED ORAL at 08:42

## 2020-10-17 RX ADMIN — OXYCODONE HYDROCHLORIDE AND ACETAMINOPHEN 2 TABLET: 7.5; 325 TABLET ORAL at 14:58

## 2020-10-17 RX ADMIN — PANTOPRAZOLE SODIUM 40 MG: 40 TABLET, DELAYED RELEASE ORAL at 05:00

## 2020-10-17 RX ADMIN — SODIUM CHLORIDE, PRESERVATIVE FREE 10 ML: 5 INJECTION INTRAVENOUS at 20:55

## 2020-10-17 NOTE — PLAN OF CARE
Problem: Adult Inpatient Plan of Care  Goal: Plan of Care Review  Recent Flowsheet Documentation  Taken 10/17/2020 0915 by Jaleel Laird, PT  Plan of Care Reviewed With: patient  Outcome Summary: PT: Patient performs supine to sit transfer with mod A, sit to/from stand transfer with mod A and stand pivot transfer with min A x 2 with use of FWW. Patient requires cues for sequencing, safety and to adhere to PWB status. Performed LE LAQ x 10 reps. Patient unable to perform hip flexion in sitting. Recommend SNF with extended stay due to persistent tremors and PWB status. If patient unable to transfer to SNF recommend 24/7 care with ramp and w/c.

## 2020-10-17 NOTE — PLAN OF CARE
Goal Outcome Evaluation:  Plan of Care Reviewed With: patient  Progress: improving  Outcome Summary: Pt up with assist x1 and a walker.  Assist x2 at times.  Pain to right hip controlled with Percocet 7.5 mg two tablets Q4h.  Nurse did have to explain to pt that he must request his pain medication and that it simply cannot be given since it is ordered PRN and not actually scheduled.  Pt stated that no one had explained this to him before.  Pt also requests ice packs to help with pain as well.  Pt refuses SCDs but is on PO Eliquis.  Still awaiting placement.  Call light within reach.

## 2020-10-17 NOTE — PROGRESS NOTES
"Hospitalist Team      Patient Care Team:  Liza Sapp PA-C as PCP - General (Family Medicine)        Chief Complaint:  Tremors which patient is denying alcohol intake now??    Subjective    Interval History and ROS:     Patient States Feeling better and participating well with PT.  Thinks he could go home Monday.  Will need someone to get him in the house.   Says he can get someone to get meds and food from grocery store for him  Patient Complaints: pain but improving  Patient Denies:  Nausea or vomiting  History taken from: patient chart RN      Objective    Vital Signs  Temp:  [97.4 °F (36.3 °C)-98.7 °F (37.1 °C)] 98.7 °F (37.1 °C)  Heart Rate:  [] 100  Resp:  [16] 16  BP: (105-123)/(62-72) 123/72  Oxygen Therapy  SpO2: 96 %  Pulse Oximetry Type: Intermittent  Device (Oxygen Therapy): room air  Device (Oxygen Therapy): room air  Flow (L/min): 3  Oxygen Concentration (%): 33  ETCO2 (mmHg): (on standby)  $ ETCO2 Daily Assessment (RT Only): yes}  Flowsheet Rows      First Filed Value   Admission Height  182 cm (71.65\") Documented at 10/08/2020 1321   Admission Weight  113 kg (250 lb) Documented at 10/08/2020 1321          Body mass index is 33.91 kg/m².      Physical Exam:    Physical Exam  Vitals signs and nursing note reviewed.   Constitutional:       Appearance: He is obese.   Cardiovascular:      Rate and Rhythm: Normal rate and regular rhythm.   Pulmonary:      Effort: Pulmonary effort is normal.      Breath sounds: Normal breath sounds.   Abdominal:      Palpations: Abdomen is soft.   Neurological:      Mental Status: He is oriented to person, place, and time.         Results Review:     I reviewed the patient's new clinical results.    Lab Results (last 24 hours)     Procedure Component Value Units Date/Time    POC Glucose Once [[id number]]  (Normal) Collected: 10/17/20 0734    Specimen: Blood Updated: 10/17/20 0744     Glucose 86 mg/dL           Imaging Results (Last 24 Hours)     ** No results " found for the last 24 hours. **          Xray not reviewed personally by physician.      ECG not reviewed personally by physician  ECG/EMG Results (most recent)     Procedure Component Value Units Date/Time    ECG 12 Lead [[id number]] Collected: 10/08/20 1504     Updated: 10/09/20 1552    Narrative:      HEART RATE= 104  bpm  RR Interval= 576  ms  [place] Interval= 195  ms  P Horizontal Axis= 33  deg  P Front Axis= 68  deg  QRSD Interval= 97  ms  QT Interval= 345  ms  QRS Axis= 45  deg  T Wave Axis= 25  deg  - OTHERWISE NORMAL ECG -  Sinus tachycardia  Ventricular premature complex  No change from prior tracing  Electronically Signed By: Rochelle Damico ([facility]) 09-Oct-2020 15:33:51  Date and Time of Study: 2020-10-08 15:04:36          Medication Review:   I have reviewed the patient's current medication list    Current Facility-Administered Medications:   •  apixaban (ELIQUIS) tablet 2.5 mg, 2.5 mg, Oral, Q12H, Adalberto Solo MD, 2.5 mg at 10/16/20 2055  •  bisacodyl (DULCOLAX) EC tablet 5 mg, 5 mg, Oral, Daily PRN, Adalberto Solo MD  •  bisacodyl (DULCOLAX) suppository 10 mg, 10 mg, Rectal, Daily PRN, Adalberto Solo MD  •  folic acid (FOLVITE) tablet 1 mg, 1 mg, Oral, Daily, Adalberto Solo MD, 1 mg at 10/16/20 0848  •  magnesium hydroxide (MILK OF MAGNESIA) suspension 2400 mg/10mL 10 mL, 10 mL, Oral, Daily PRN, Adalberto Solo MD, 10 mL at 10/13/20 1035  •  Magnesium Oxide tablet 400 mg, 400 mg, Oral, Daily, Adalberto Solo MD, 400 mg at 10/16/20 0849  •  melatonin tablet 5 mg, 5 mg, Oral, Nightly PRN, Adalberto Solo MD  •  multivitamin with minerals 1 tablet, 1 tablet, Oral, Daily, Adalberto Solo MD, 1 tablet at 10/16/20 0850  •  [] morphine injection 4 mg, 4 mg, Intravenous, Q4H PRN, 4 mg at 10/09/20 0916 **AND** naloxone (NARCAN) injection 0.4 mg, 0.4 mg, Intravenous, Q5 Min PRN, Adalberto Solo MD  •  nitroglycerin (NITROSTAT) SL tablet 0.4 mg, 0.4 mg, Sublingual, Q5  Min PRN, Adalberto Solo MD  •  ondansetron (ZOFRAN) tablet 4 mg, 4 mg, Oral, Q6H PRN **OR** ondansetron (ZOFRAN) injection 4 mg, 4 mg, Intravenous, Q6H PRN, Adalberto Solo MD  •  ondansetron (ZOFRAN) tablet 4 mg, 4 mg, Oral, Q6H PRN **OR** ondansetron (ZOFRAN) injection 4 mg, 4 mg, Intravenous, Q6H PRN, Adalberto Solo MD  •  oxyCODONE-acetaminophen (PERCOCET) 7.5-325 MG per tablet 1 tablet, 1 tablet, Oral, Q4H PRN, Simone Root DO, 1 tablet at 10/15/20 0752  •  oxyCODONE-acetaminophen (PERCOCET) 7.5-325 MG per tablet 2 tablet, 2 tablet, Oral, Q4H PRN, Simone Root DO, 2 tablet at 10/17/20 0215  •  pantoprazole (PROTONIX) EC tablet 40 mg, 40 mg, Oral, Q AM, Jhoan Lazcano MD, 40 mg at 10/16/20 0537  •  [COMPLETED] Insert peripheral IV, , , Once **AND** sodium chloride 0.9 % flush 10 mL, 10 mL, Intravenous, PRN, Adalberto Solo MD  •  sodium chloride 0.9 % flush 10 mL, 10 mL, Intravenous, PRN, Adalberto Solo MD  •  sodium chloride 0.9 % flush 10 mL, 10 mL, Intravenous, Q12H, Adalberto Solo MD, 10 mL at 10/16/20 2055  •  sodium chloride 0.9 % infusion 40 mL, 40 mL, Intravenous, PRNEdil Nicholas A, MD      Assessment/Plan        Right hip fracture and surgery done/ S/P right hip nail   awaiting placement short-term rehab for gait training, strengthening range of motion.  Disposition per hospitalist we will follow-up in office 2 weeks postop with x-ray imaging right hip.     Alcohol abuse with recent admission to [facility] and seizure activity              Monitoring [facility]     Chronic pancytopenia / macrocytic anemia with coagulopathy     Recent ABLA and GIB/ Hgb 9.7 today     C diff infection              On oral vancomycin     Fatty liver disease     Dysphagia     Obesity/ Body mass index is 33.91 kg/m².       Plan for disposition:  Check bmp and cbc today.    Radha Daugherty DO  10/17/20  08:20 EDT      Time: 20 min

## 2020-10-17 NOTE — THERAPY TREATMENT NOTE
Acute Care - Physical Therapy Treatment Note  SUSANNA Smith     Patient Name: Simone Boykin  : 1970  MRN: [id number]  Today's Date: 10/17/2020   Onset of Illness/Injury or Date of Surgery: 10/08/20       PT Assessment (last 12 hours)      PT Evaluation and Treatment     Row Name 10/17/20 0915          Physical Therapy Time and Intention    Subjective Information  complains of;pain  -BP     Document Type  therapy note (daily note)  -BP     Mode of Treatment  physical therapy  -BP     Patient Effort  adequate  -BP     Symptoms Noted During/After Treatment  increased pain  -BP     Row Name 10/17/20 0915          General Information    Patient/Family/Caregiver Comments/Observations  Patient supine in bed with HOB elevated. Agreeable to PT treatment  -BP     Existing Precautions/Restrictions  fall;partial weight bearing  -BP     Row Name 10/17/20 0915          Pain Scale: Numbers Pre/Post-Treatment    Pre/Posttreatment Pain Comment  Patient complains of pain but does not rate   -BP     Row Name 10/17/20 0915          Pain Scale: Word Pre/Post-Treatment    Pain Intervention(s)  Cold applied;Repositioned  -BP     Row Name 10/17/20 0915          Bed Mobility    Bed Mobility  supine-sit  -BP     Supine-Sit [place] (Bed Mobility)  moderate assist (50% patient effort);verbal cues  -BP     Assistive Device (Bed Mobility)  bed rails;head of bed elevated  -BP     Comment (Bed Mobility)  patient requires extended time to complete transfer   -BP     Row Name 10/17/20 0915          Transfers    Transfers  sit-stand transfer;stand-sit transfer;stand pivot/stand step transfer  -BP     Comment (Transfers)  Verbal cues for hand placement  -BP     Sit-Stand [place] (Transfers)  moderate assist (50% patient effort);verbal cues  -BP     Stand-Sit [place] (Transfers)  minimum assist (75% patient effort);verbal cues  -BP     Row Name 10/17/20 0915          Sit-Stand Transfer    Assistive Device (Sit-Stand  Transfers)  walker, front-wheeled  -BP     Row Name 10/17/20 0915          Stand-Sit Transfer    Assistive Device (Stand-Sit Transfers)  walker, front-wheeled  -BP     Row Name 10/17/20 0915          Stand Pivot/Stand Step Transfer    Stand Pivot/Stand Step [place] (Transfers)  minimum assist (75% patient effort);verbal cues  -     Assistive Device (Stand Pivot Stand Step Transfer)  walker, front-wheeled  -BP     Row Name 10/17/20 0915          Gait/Stairs (Locomotion)    Comment (Gait/Stairs)  transfers only   -BP     Row Name 10/17/20 0915          Safety Issues, Functional Mobility    Safety Issues Affecting Function (Mobility)  insight into deficits/self-awareness;safety precautions follow-through/compliance;problem-solving;positioning of assistive device  -BP     Comment, Safety Issues/Impairments (Mobility)  Patient requires cues to adhere to PWB status as well as cues for safety with use of AD.   -BP     Row Name             Wound 10/09/20 1700 Right lateral hip Incision    Wound - Properties Group Placement Date: 10/09/20  -ME Placement Time: 1700  -ME Side: Right  -ME Orientation: lateral  -ME Location: hip  -ME, hip and leg incisions  Primary Wound Type: Incision  -ME Additional Comments: prineo, telfa, tegaderms  -ME    Retired Wound - Properties Group Date first assessed: 10/09/20  -ME Time first assessed: 1700  -ME Side: Right  -ME Location: hip  -ME, hip and leg incisions  Primary Wound Type: Incision  -ME Additional Comments: prineo, telfa, tegaderms  -ME    Row Name 10/17/20 0915          Plan of Care Review    Plan of Care Reviewed With  patient  -BP     Outcome Summary  PT: Patient performs supine to sit transfer with mod A, sit to/from stand transfer with mod A and stand pivot transfer with min A x 2 with use of FWW. Patient requires cues for sequencing, safety and to adhere to PWB status. Performed LE LAQ x 10 reps. Patient unable to perform hip flexion in sitting. Recommend SNF with  extended stay due to persistent tremors and PWB status. If patient unable to transfer to SNF recommend 24/7 care with ramp and w/c.   -BP     Row Name 10/17/20 0915          Positioning and Restraints    Pre-Treatment Position  in bed  -BP     Post Treatment Position  chair  -BP     In Chair  reclined;call light within reach;encouraged to call for assist;notified nsg  -BP     Row Name 10/17/20 0915          Progress Summary (PT)    Progress Toward Functional Goals (PT)  unable to show any progress toward functional goals  -BP     Barriers to Overall Progress (PT)  tremors and overall motivation   -BP       User Key  (r) = Recorded By, (t) = Taken By, (c) = Cosigned By    Initials Name Provider Type    BP Jaleel Laird, PT Physical Therapist    Izabella Kebede RN Registered Nurse        Physical Therapy Education                 Title: PT OT SLP Therapies (In Progress)     Topic: Physical Therapy (In Progress)     Point: Mobility training (In Progress)     Learning Progress Summary           Patient Acceptance, E,TB, NR by BP at 10/17/2020 1018    Acceptance, E,TB, NR by  at 10/16/2020 1016    Acceptance, E,TB, NR by  at 10/15/2020 1315    Acceptance, E,TB, NR by BP at 10/14/2020 1104    Acceptance, E, VU by AB at 10/13/2020 0529    Acceptance, E,TB, VU by  at 10/12/2020 1540    Acceptance, E,TB, VU by  at 10/12/2020 1201    Acceptance, E, VU by LN at 10/10/2020 1256    Comment: Education on functional mobility and gait with FWW, PWB right leg.                   Point: Home exercise program (Not Started)     Learner Progress:  Not documented in this visit.          Point: Body mechanics (Done)     Learning Progress Summary           Patient Acceptance, E, VU by AB at 10/13/2020 0529    Acceptance, E, VU by LN at 10/10/2020 1256    Comment: Education on functional mobility and gait with FWW, PWB right leg.                   Point: Precautions (In Progress)     Learning Progress Summary           Patient  Acceptance, E,TB, NR by BP at 10/17/2020 1018    Acceptance, E,TB, NR by BP at 10/16/2020 1016    Acceptance, E,TB, NR by JW at 10/15/2020 1315    Acceptance, E,TB, NR by BP at 10/14/2020 1104    Acceptance, E, VU by AB at 10/13/2020 0529    Acceptance, E,TB, VU by  at 10/12/2020 1540    Acceptance, E,TB, VU by  at 10/12/2020 1201    Acceptance, E, VU by LN at 10/10/2020 1256    Comment: Education on functional mobility and gait with FWW, PWB right leg.                               User Key     Initials Effective Dates Name Provider Type Discipline     06/08/18 -  Graciela Oliveira, PT Physical Therapist PT    AB 06/16/16 -  Marina Hendrix, RN Registered Nurse Nurse    LN 08/09/20 -  Nanette Rose, PT Physical Therapist PT    BP 04/03/18 -  Jaleel Laird, PT Physical Therapist PT     04/03/18 -  Sapphire Anthony, PT Physical Therapist PT              PT Recommendation and Plan  Anticipated Discharge Disposition (PT): skilled nursing facility, extended care facility  Therapy Frequency (PT): daily  Progress Summary (PT)  Progress Toward Functional Goals (PT): unable to show any progress toward functional goals  Barriers to Overall Progress (PT): tremors and overall motivation   Plan of Care Reviewed With: patient  Outcome Summary: PT: Patient performs supine to sit transfer with mod A, sit to/from stand transfer with mod A and stand pivot transfer with min A x 2 with use of FWW. Patient requires cues for sequencing, safety and to adhere to PWB status. Performed LE LAQ x 10 reps. Patient unable to perform hip flexion in sitting. Recommend SNF with extended stay due to persistent tremors and PWB status. If patient unable to transfer to SNF recommend 24/7 care with ramp and w/c.   Outcome Measures     Row Name 10/17/20 0915 10/16/20 0941 10/16/20 0940       How much help from another person do you currently need...    Turning from your back to your side while in flat bed without using bedrails?  3   -BP  --  3  -BP    Moving from lying on back to sitting on the side of a flat bed without bedrails?  2  -BP  --  2  -BP    Moving to and from a bed to a chair (including a wheelchair)?  2  -BP  --  3  -BP    Standing up from a chair using your arms (e.g., wheelchair, bedside chair)?  2  -BP  --  3  -BP    Climbing 3-5 steps with a railing?  1  -BP  --  1  -BP    To walk in hospital room?  2  -BP  --  2  -BP    AM-PAC 6 Clicks Score (PT)  12  -BP  --  14  -BP       How much help from another is currently needed...    Putting on and taking off regular lower body clothing?  --  2  -JJ  --    Bathing (including washing, rinsing, and drying)  --  2  -JJ  --    Toileting (which includes using toilet bed pan or urinal)  --  2  -JJ  --    Putting on and taking off regular upper body clothing  --  4  -JJ  --    Taking care of personal grooming (such as brushing teeth)  --  4  -JJ  --    Eating meals  --  4  -JJ  --    AM-PAC 6 Clicks Score (OT)  --  18  -JJ  --       Functional Assessment    Outcome Measure Options  AM-PAC 6 Clicks Basic Mobility (PT)  -BP  --  AM-PAC 6 Clicks Basic Mobility (PT)  -BP    Row Name 10/15/20 0921 10/15/20 0920          How much help from another person do you currently need...    Turning from your back to your side while in flat bed without using bedrails?  --  2  -JW     Moving from lying on back to sitting on the side of a flat bed without bedrails?  --  2  -JW     Moving to and from a bed to a chair (including a wheelchair)?  --  2  -JW     Standing up from a chair using your arms (e.g., wheelchair, bedside chair)?  --  2  -JW     Climbing 3-5 steps with a railing?  --  1  -JW     To walk in hospital room?  --  1  -JW     AM-PAC 6 Clicks Score (PT)  --  10  -JW        How much help from another is currently needed...    Putting on and taking off regular lower body clothing?  2  -JJ  --     Bathing (including washing, rinsing, and drying)  2  -JJ  --     Toileting (which includes using  toilet bed pan or urinal)  2  -JJ  --     Putting on and taking off regular upper body clothing  4  -JJ  --     Taking care of personal grooming (such as brushing teeth)  4  -JJ  --     Eating meals  4  -JJ  --     AM-PAC 6 Clicks Score (OT)  18  -JJ  --        Functional Assessment    Outcome Measure Options  --  AM-PAC 6 Clicks Basic Mobility (PT)  -JW       User Key  (r) = Recorded By, (t) = Taken By, (c) = Cosigned By    Initials Name Provider Type    Ayah Shaw, OTR Occupational Therapist    Jaleel Kim, PT Physical Therapist    Sapphire Velázquez, PT Physical Therapist           Time Calculation:   PT Charges     Row Name 10/17/20 1020             Time Calculation    Start Time  0915  -BP      Stop Time  0937  -BP      Time Calculation (min)  22 min  -BP      PT Received On  10/17/20  -BP      PT - Next Appointment  10/18/20  -BP        User Key  (r) = Recorded By, (t) = Taken By, (c) = Cosigned By    Initials Name Provider Type    Jaleel Kim, PT Physical Therapist        Therapy Charges for Today     Code Description Service Date Service Provider Modifiers Qty    64051316824 HC PT THER PROC EA 15 MIN 10/16/2020 Jaleel Laird, PT GP 1    71408233409 HC PT THER PROC EA 15 MIN 10/17/2020 Jaleel Laird, PT GP 1    43260666561 HC PT THER SUPP EA 15 MIN 10/17/2020 Jaleel Laird, PT GP 1          PT G-Codes  Outcome Measure Options: AM-PAC 6 Clicks Basic Mobility (PT)  AM-PAC 6 Clicks Score (PT): 12  AM-PAC 6 Clicks Score (OT): 18    Jaleel Laird PT  10/17/2020

## 2020-10-17 NOTE — PROGRESS NOTES
POD# 8 s/p right hip nail    Subjective: Simone Boykin states that he is doing much better today.  He states he ambulated down the [place] with physical therapy.  He feels like in a couple days he would be ready to go home.  I spent 15 minutes with him today answering questions and making sure that all of his concerns from an orthopedic standpoint were addressed.  He states that his pain is significant but this very well improved with the Percocet.  He feels like he has made significant progress in the last 3 days in particular.  I did show him his x-rays from the computer monitor in his room and discussed with him the type of implant that we placed in his right femur to stabilize his severely comminuted intertrochanteric fracture.  He had all questions answered in regards to this implant.    Objective:  Vitals:    10/16/20 0545 10/16/20 1549 10/16/20 1945 10/17/20 0700   BP: 107/62 105/62 114/64 123/72   BP Location: Right arm Right arm  Right arm   Patient Position: Lying Sitting  Lying   Pulse: 104 96 95 100   Resp: 20 16 16 16   Temp: 98.9 °F (37.2 °C) 98.2 °F (36.8 °C) 97.4 °F (36.3 °C) 98.7 °F (37.1 °C)   TempSrc: Oral Oral  Oral   SpO2: 97% 95% 99% 96%   Weight:       Height:           Results from last 7 days   Lab Units 10/17/20  0843 10/13/20  0758 10/12/20  0441 10/11/20  0424   WBC 10*3/mm3 6.45  --   --  9.00   HEMOGLOBIN g/dL 9.7* 8.6* 8.5* 6.5*   HEMATOCRIT % 32.2* 27.3* 27.2* 20.9*   PLATELETS 10*3/mm3 175  --   --  145       Results from last 7 days   Lab Units 10/17/20  0843 10/13/20  0758 10/11/20  0424   SODIUM mmol/L 134* 136 137   POTASSIUM mmol/L 4.7 3.6 4.1   CHLORIDE mmol/L 101 104 106   CO2 mmol/L 22.0 24.8 22.2   BUN mg/dL 8 8 11   CREATININE mg/dL 0.74* 0.75* 0.82   GLUCOSE mg/dL 116* 121* 137*   CALCIUM mg/dL 8.7 8.3* 8.0*       Exam:    Right lower extremity examined  Incisions clean, dry, intact  Flex extend all digits right foot, brisk cap refill all digits  2+ dorsalis pedis  pulse  All compartments soft and easily compressible  Negative calf tenderness, negative Homans sign    Impression: s/p right hip nail    Plan:  1. PT/OT- ambulate, partial weightbearing right lower extremity  2. Pain control-Percocet  3. DVT prophylaxis-Eliquis twice daily for total 4 weeks postop  4. Wound care-Dermabond remain intact until follow-up in office  5. Disposition- placement per hospitalist

## 2020-10-17 NOTE — PLAN OF CARE
Goal Outcome Evaluation:  Plan of Care Reviewed With: patient  Progress: improving  Outcome Summary: Pt VS see measures. Pt reports pain controlled with current regimen. Pt resting at this time.

## 2020-10-18 LAB — GLUCOSE BLDC GLUCOMTR-MCNC: 123 MG/DL (ref 70–130)

## 2020-10-18 PROCEDURE — 82962 GLUCOSE BLOOD TEST: CPT

## 2020-10-18 PROCEDURE — 99231 SBSQ HOSP IP/OBS SF/LOW 25: CPT | Performed by: HOSPITALIST

## 2020-10-18 PROCEDURE — 97110 THERAPEUTIC EXERCISES: CPT

## 2020-10-18 RX ADMIN — PANTOPRAZOLE SODIUM 40 MG: 40 TABLET, DELAYED RELEASE ORAL at 06:03

## 2020-10-18 RX ADMIN — SODIUM CHLORIDE, PRESERVATIVE FREE 10 ML: 5 INJECTION INTRAVENOUS at 08:18

## 2020-10-18 RX ADMIN — SODIUM CHLORIDE, PRESERVATIVE FREE 10 ML: 5 INJECTION INTRAVENOUS at 20:08

## 2020-10-18 RX ADMIN — OXYCODONE HYDROCHLORIDE AND ACETAMINOPHEN 2 TABLET: 7.5; 325 TABLET ORAL at 08:18

## 2020-10-18 RX ADMIN — APIXABAN 2.5 MG: 2.5 TABLET, FILM COATED ORAL at 08:18

## 2020-10-18 RX ADMIN — OXYCODONE HYDROCHLORIDE AND ACETAMINOPHEN 2 TABLET: 7.5; 325 TABLET ORAL at 17:54

## 2020-10-18 RX ADMIN — FOLIC ACID 1 MG: 1 TABLET ORAL at 08:18

## 2020-10-18 RX ADMIN — APIXABAN 2.5 MG: 2.5 TABLET, FILM COATED ORAL at 20:09

## 2020-10-18 RX ADMIN — MULTIPLE VITAMINS W/ MINERALS TAB 1 TABLET: TAB at 08:18

## 2020-10-18 RX ADMIN — Medication 400 MG: at 08:18

## 2020-10-18 RX ADMIN — OXYCODONE HYDROCHLORIDE AND ACETAMINOPHEN 2 TABLET: 7.5; 325 TABLET ORAL at 13:36

## 2020-10-18 NOTE — PLAN OF CARE
Goal Outcome Evaluation:  Plan of Care Reviewed With: patient  Progress: improving  Outcome Summary: sitting in recliner with feet elevated for beginning of shift- RLE edematous - pain controlled with 2 percocet tabs - ice packs to right hip - rested well through night

## 2020-10-18 NOTE — PLAN OF CARE
Goal Outcome Evaluation:  Plan of Care Reviewed With: patient  Progress: improving  Outcome Summary: Pt pain controlled with PRN Percocet 7.5 mg 2 tabs Q4h.  Pt reminded again that he has to call and request pain medication since it is PRN.  Pt also reminded that his pain medication is not scheduled.  Up to chair this afternoon.  Mom was in for a visit.  Pt is also treating pain with ice packs.  Remains in contact for C-diff (hx).  Call light within reach.

## 2020-10-18 NOTE — THERAPY TREATMENT NOTE
Acute Care - Physical Therapy Treatment Note  SUSANNA Smith     Patient Name: Simone Boykin  : 1970  MRN: [id number]  Today's Date: 10/18/2020   Onset of Illness/Injury or Date of Surgery: 10/08/20       PT Assessment (last 12 hours)      PT Evaluation and Treatment     Row Name 10/18/20 0935          Physical Therapy Time and Intention    Subjective Information  complains of;pain  -JW     Document Type  therapy note (daily note)  -     Mode of Treatment  physical therapy  -     Patient Effort  adequate  -     Symptoms Noted During/After Treatment  increased pain  -     Row Name 10/18/20 0935          General Information    General Observations of Patient  pt supine, agrees to therapy  -     Row Name 10/18/20 0935          Cognition    Personal Safety Interventions  gait belt;nonskid shoes/slippers when out of bed  -     Row Name 10/18/20 0935          Pain Scale: Numbers Pre/Post-Treatment    Pre/Posttreatment Pain Comment  pt with no c/o pain with mobility  -     Row Name 10/18/20 0935          Mobility    Extremity Weight-bearing Status  right lower extremity  -     Right Lower Extremity (Weight-bearing Status)  partial weight-bearing (PWB)  -     Row Name 10/18/20 0935          Bed Mobility    Bed Mobility  supine-sit  -     Supine-Sit [place] (Bed Mobility)  minimum assist (75% patient effort)  -     Assistive Device (Bed Mobility)  bed rails;head of bed elevated  -     Comment (Bed Mobility)  requires increased time to complete transfer  -     Row Name 10/18/20 0935          Transfers    Transfers  sit-stand transfer;stand-sit transfer;stand pivot/stand step transfer  -     Maintains Weight-bearing Status (Transfers)  able to maintain  -     Comment (Transfers)  pt requires min assist to stand from elevated bed surface, mod assist x2 from recliner surface  -     Sit-Stand [place] (Transfers)  minimum assist (75% patient effort);moderate assist (50% patient  effort);2 person assist  -     Stand-Sit [place] (Transfers)  minimum assist (75% patient effort);verbal cues  -     Row Name 10/18/20 0935          Sit-Stand Transfer    Assistive Device (Sit-Stand Transfers)  walker, front-wheeled  -BRYAN     Row Name 10/18/20 0935          Stand-Sit Transfer    Assistive Device (Stand-Sit Transfers)  walker, front-wheeled  -BRYAN     Row Name 10/18/20 0935          Stand Pivot/Stand Step Transfer    Stand Pivot/Stand Step [place] (Transfers)  minimum assist (75% patient effort);verbal cues  -     Assistive Device (Stand Pivot Stand Step Transfer)  walker, front-wheeled  -BRYAN     Row Name 10/18/20 0935          Gait/Stairs (Locomotion)    Comment (Gait/Stairs)  pt able to perform gait x5 feet with min assist, able to maintain PWB on right LE.  significant tremors throughout UEs/LEs during attemptd mobility.  requires close chair follow for rest break  -     Row Name             Wound 10/09/20 1700 Right lateral hip Incision    Wound - Properties Group Placement Date: 10/09/20  -ME Placement Time: 1700  -ME Side: Right  -ME Orientation: lateral  -ME Location: hip  -ME, hip and leg incisions  Primary Wound Type: Incision  -ME Additional Comments: prineo, telfa, tegaderms  -ME    Retired Wound - Properties Group Date first assessed: 10/09/20  -ME Time first assessed: 1700  -ME Side: Right  -ME Location: hip  -ME, hip and leg incisions  Primary Wound Type: Incision  -ME Additional Comments: prineo, telfa, tegaderms  -ME    Row Name 10/18/20 0935          Plan of Care Review    Outcome Summary  PT: Patient performs supine to sit with min assist and sit to stand from elevated bed surface with min assist.  Patient performs stand pivot transfer into recliner with min assist, able to maintain PWB on right LE.  Patient performs gait x5 feet with rolling walker, min assist however continued bilateral UE/LE shaking noted thorughout mobility, tremors worsening with gait activities.   -     Row Name 10/18/20 0935          Positioning and Restraints    Pre-Treatment Position  in bed  -     Post Treatment Position  chair  -JW     In Chair  reclined;call light within reach;encouraged to call for assist  -     Row Name 10/18/20 0935          Progress Summary (PT)    Progress Toward Functional Goals (PT)  progress toward functional goals is good  -     Barriers to Overall Progress (PT)  tremors throughout body with mobility  -       User Key  (r) = Recorded By, (t) = Taken By, (c) = Cosigned By    Initials Name Provider Type    JW Sapphire Anthony, PT Physical Therapist    Izabella Kebede, RN Registered Nurse        Physical Therapy Education                 Title: PT OT SLP Therapies (In Progress)     Topic: Physical Therapy (In Progress)     Point: Mobility training (Done)     Learning Progress Summary           Patient Acceptance, E,TB, VU,NR by  at 10/18/2020 1010    Acceptance, E,TB, NR by  at 10/17/2020 1018    Acceptance, E,TB, NR by  at 10/16/2020 1016    Acceptance, E,TB, NR by  at 10/15/2020 1315    Acceptance, E,TB, NR by  at 10/14/2020 1104    Acceptance, E, VU by AB at 10/13/2020 0529    Acceptance, E,TB, VU by  at 10/12/2020 1540    Acceptance, E,TB, VU by  at 10/12/2020 1201    Acceptance, E, VU by  at 10/10/2020 1256    Comment: Education on functional mobility and gait with FWW, PWB right leg.                   Point: Home exercise program (Not Started)     Learner Progress:  Not documented in this visit.          Point: Body mechanics (Done)     Learning Progress Summary           Patient Acceptance, E, VU by AB at 10/13/2020 0529    Acceptance, E, VU by  at 10/10/2020 1256    Comment: Education on functional mobility and gait with FWW, PWB right leg.                   Point: Precautions (Done)     Learning Progress Summary           Patient Acceptance, E,TB, VU,NR by  at 10/18/2020 1010    Acceptance, E,TB, NR by  at 10/17/2020 1018    Acceptance,  E,TB, NR by BP at 10/16/2020 1016    Acceptance, E,TB, NR by JW at 10/15/2020 1315    Acceptance, E,TB, NR by BP at 10/14/2020 1104    Acceptance, E, VU by AB at 10/13/2020 0529    Acceptance, E,TB, VU by  at 10/12/2020 1540    Acceptance, E,TB, VU by  at 10/12/2020 1201    Acceptance, E, VU by LN at 10/10/2020 1256    Comment: Education on functional mobility and gait with FWW, PWB right leg.                               User Key     Initials Effective Dates Name Provider Type Discipline     06/08/18 -  Graciela Oliveira, PT Physical Therapist PT    AB 06/16/16 -  Marina Hendrix RN Registered Nurse Nurse     08/09/20 -  Nanette Rose, PT Physical Therapist PT     04/03/18 -  Celia Laird-Demond, PT Physical Therapist PT     04/03/18 -  Sapphire Anthony, PT Physical Therapist PT              PT Recommendation and Plan  Anticipated Discharge Disposition (PT): skilled nursing facility, extended care facility  Progress Summary (PT)  Progress Toward Functional Goals (PT): progress toward functional goals is good  Barriers to Overall Progress (PT): tremors throughout body with mobility  Plan of Care Reviewed With: patient  Outcome Summary: PT: Patient performs supine to sit with min assist and sit to stand from elevated bed surface with min assist.  Patient performs stand pivot transfer into recliner with min assist, able to maintain PWB on right LE.  Patient performs gait x5 feet with rolling walker, min assist however continued bilateral UE/LE shaking noted thorughout mobility, tremors worsening with gait activities.  Outcome Measures     Row Name 10/18/20 0935 10/17/20 0915 10/16/20 0941       How much help from another person do you currently need...    Turning from your back to your side while in flat bed without using bedrails?  3  -JW  3  -BP  --    Moving from lying on back to sitting on the side of a flat bed without bedrails?  3  -JW  2  -BP  --    Moving to and from a bed to a chair  (including a wheelchair)?  3  -JW  2  -BP  --    Standing up from a chair using your arms (e.g., wheelchair, bedside chair)?  2  -JW  2  -BP  --    Climbing 3-5 steps with a railing?  1  -JW  1  -BP  --    To walk in hospital room?  3  -JW  2  -BP  --    AM-PAC 6 Clicks Score (PT)  15  -JW  12  -BP  --       How much help from another is currently needed...    Putting on and taking off regular lower body clothing?  --  --  2  -JJ    Bathing (including washing, rinsing, and drying)  --  --  2  -JJ    Toileting (which includes using toilet bed pan or urinal)  --  --  2  -JJ    Putting on and taking off regular upper body clothing  --  --  4  -JJ    Taking care of personal grooming (such as brushing teeth)  --  --  4  -JJ    Eating meals  --  --  4  -JJ    AM-PAC 6 Clicks Score (OT)  --  --  18  -JJ       Functional Assessment    Outcome Measure Options  AM-PAC 6 Clicks Basic Mobility (PT)  -[facility] Basic Mobility (PT)  -BP  --    Row Name 10/16/20 0940             How much help from another person do you currently need...    Turning from your back to your side while in flat bed without using bedrails?  3  -BP      Moving from lying on back to sitting on the side of a flat bed without bedrails?  2  -BP      Moving to and from a bed to a chair (including a wheelchair)?  3  -BP      Standing up from a chair using your arms (e.g., wheelchair, bedside chair)?  3  -BP      Climbing 3-5 steps with a railing?  1  -BP      To walk in hospital room?  2  -BP      AM-PAC 6 Clicks Score (PT)  14  -BP         Functional Assessment    Outcome Measure Options  AM-PAC 6 Clicks Basic Mobility (PT)  -BP        User Key  (r) = Recorded By, (t) = Taken By, (c) = Cosigned By    Initials Name Provider Type    Ayah Shaw, OTR Occupational Therapist    Jaleel Kim, PT Physical Therapist    Sapphire Velázquez, PT Physical Therapist           Time Calculation:   PT Charges     Row Name 10/18/20 1011              Time Calculation    Start Time  0935  -      Stop Time  0955  -      Time Calculation (min)  20 min  -JW      PT Received On  10/18/20  -BRYAN      PT - Next Appointment  10/19/20  -         Timed Charges    51958 - PT Therapeutic Exercise Minutes  20  -JW        User Key  (r) = Recorded By, (t) = Taken By, (c) = Cosigned By    Initials Name Provider Type    Sapphire Velázquez, PT Physical Therapist        Therapy Charges for Today     Code Description Service Date Service Provider Modifiers Qty    85197222719 HC PT THER PROC EA 15 MIN 10/18/2020 Sapphire Anthony, PT GP 1    16058120907 HC PT THER SUPP EA 15 MIN 10/18/2020 Sapphire Anthony, PT GP 1          PT G-Codes  Outcome Measure Options: AM-PAC 6 Clicks Basic Mobility (PT)  AM-PAC 6 Clicks Score (PT): 15  AM-PAC 6 Clicks Score (OT): 18    Sapphire Anthony PT  10/18/2020

## 2020-10-18 NOTE — PLAN OF CARE
Problem: Adult Inpatient Plan of Care  Goal: Plan of Care Review  Recent Flowsheet Documentation  Taken 10/18/2020 0935 by Sapphire Anthony, PT  Outcome Summary: PT: Patient performs supine to sit with min assist and sit to stand from elevated bed surface with min assist.  Patient performs stand pivot transfer into recliner with min assist, able to maintain PWB on right LE.  Patient performs gait x5 feet with rolling walker, min assist however continued bilateral UE/LE shaking noted throughout mobility, tremors worsening with gait activities.

## 2020-10-18 NOTE — PROGRESS NOTES
POD# 9 s/p right hip nail    Subjective: Simone Boykin states that he is continuing to do better each day in regards to pain and function.  Still with residual swelling to right lower extremity that is fairly unchanged.  He did work with physical therapy today for some short distance ambulation.     Objective:  Vitals:    10/17/20 1125 10/17/20 1535 10/17/20 2100 10/18/20 0645   BP: 97/57 98/59 126/77 129/78   BP Location: Right arm Right arm Right arm    Patient Position: Sitting Sitting Sitting    Pulse:  96 101 101   Resp:  22 18 18   Temp: 98.8 °F (37.1 °C) 97.5 °F (36.4 °C) 97.1 °F (36.2 °C) 97.7 °F (36.5 °C)   TempSrc: Oral Oral Tympanic Oral   SpO2: 95% 92% 99% 98%   Weight:       Height:           Results from last 7 days   Lab Units 10/17/20  0843 10/13/20  0758 10/12/20  0441   WBC 10*3/mm3 6.45  --   --    HEMOGLOBIN g/dL 9.7* 8.6* 8.5*   HEMATOCRIT % 32.2* 27.3* 27.2*   PLATELETS 10*3/mm3 175  --   --        Results from last 7 days   Lab Units 10/17/20  0843 10/13/20  0758   SODIUM mmol/L 134* 136   POTASSIUM mmol/L 4.7 3.6   CHLORIDE mmol/L 101 104   CO2 mmol/L 22.0 24.8   BUN mg/dL 8 8   CREATININE mg/dL 0.74* 0.75*   GLUCOSE mg/dL 116* 121*   CALCIUM mg/dL 8.7 8.3*       Exam:    Right lower extremity examined  Incisions clean, dry, intact  Flex extend all digits right foot as well as right ankle with 4/5 strength, brisk cap refill all digits  2+ dorsalis pedis pulse  All compartments soft and easily compressible  Negative calf tenderness, negative Homans sign    Impression: s/p right hip nail    Plan:  1. PT/OT- ambulate, partial weightbearing right lower extremity  2. Pain control-Percocet  3. DVT prophylaxis-Eliquis twice daily for total 4 weeks postop  4. Wound care-Dermabond remain intact until follow-up in office  5. Disposition- placement per hospitalist

## 2020-10-18 NOTE — PROGRESS NOTES
"Hospitalist Team      Patient Care Team:  Liza Sapp PA-C as PCP - General (Family Medicine)        Chief Complaint:  Tremors/ Patient states they are familial/ Father had them    Subjective    Interval History and ROS:     Patient States Feeling better and pain upon ambulation improving  Patient Complaints: nothing new  Patient Denies:  Nausea and vomiting  History taken from: patient chart RN      Objective    Vital Signs  Temp:  [97.1 °F (36.2 °C)-98.8 °F (37.1 °C)] 97.7 °F (36.5 °C)  Heart Rate:  [] 101  Resp:  [18-22] 18  BP: ()/(57-78) 129/78  Oxygen Therapy  SpO2: 98 %  Pulse Oximetry Type: Intermittent  Device (Oxygen Therapy): room air  Device (Oxygen Therapy): room air  Flow (L/min): 3  Oxygen Concentration (%): 33  ETCO2 (mmHg): (on standby)  $ ETCO2 Daily Assessment (RT Only): yes}  Flowsheet Rows      First Filed Value   Admission Height  182 cm (71.65\") Documented at 10/08/2020 1321   Admission Weight  113 kg (250 lb) Documented at 10/08/2020 1321          Body mass index is 33.91 kg/m².      Physical Exam:    Physical Exam  Vitals signs and nursing note reviewed.   Constitutional:       Appearance: He is obese.   Cardiovascular:      Rate and Rhythm: Normal rate and regular rhythm.   Pulmonary:      Effort: Pulmonary effort is normal.      Breath sounds: Normal breath sounds.   Abdominal:      General: Bowel sounds are normal.      Palpations: Abdomen is soft.   Skin:     General: Skin is warm and dry.   Neurological:      Mental Status: He is alert and oriented to person, place, and time.         Results Review:     I reviewed the patient's new clinical results.    Lab Results (last 24 hours)     Procedure Component Value Units Date/Time    POC Glucose Once [[id number]]  (Normal) Collected: 10/18/20 0724    Specimen: Blood Updated: 10/18/20 0741     Glucose 123 mg/dL     Basic Metabolic Panel [[id number]]  (Abnormal) Collected: 10/17/20 0843    Specimen: Blood Updated: 10/17/20 0944 "     Glucose 116 mg/dL      BUN 8 mg/dL      Creatinine 0.74 mg/dL      Sodium 134 mmol/L      Potassium 4.7 mmol/L      Comment: Slight hemolysis detected by analyzer. Results may be affected.        Chloride 101 mmol/L      CO2 22.0 mmol/L      Calcium 8.7 mg/dL      eGFR Non African Amer 112 mL/min/1.73      BUN/Creatinine Ratio 10.8     Anion Gap 11.0 mmol/L     Narrative:      GFR Normal >60  Chronic Kidney Disease <60  Kidney Failure <15      CBC & Differential [[id number]]  (Abnormal) Collected: 10/17/20 0843    Specimen: Blood Updated: 10/17/20 0928    Narrative:      The following orders were created for panel order CBC & Differential.  Procedure                               Abnormality         Status                     ---------                               -----------         ------                     CBC Auto Differential[[id number]]        Abnormal            Final result                 Please view results for these tests on the individual orders.    CBC Auto Differential [[id number]]  (Abnormal) Collected: 10/17/20 0843    Specimen: Blood Updated: 10/17/20 0928     WBC 6.45 10*3/mm3      RBC 3.16 10*6/mm3      Hemoglobin 9.7 g/dL      Hematocrit 32.2 %      .9 fL      MCH 30.7 pg      MCHC 30.1 g/dL      RDW 15.2 %      RDW-SD 57.2 fl      MPV 10.9 fL      Platelets 175 10*3/mm3      Neutrophil % 60.7 %      Lymphocyte % 19.2 %      Monocyte % 11.9 %      Eosinophil % 5.7 %      Basophil % 0.8 %      Immature Grans % 1.7 %      Neutrophils, Absolute 3.91 10*3/mm3      Lymphocytes, Absolute 1.24 10*3/mm3      Monocytes, Absolute 0.77 10*3/mm3      Eosinophils, Absolute 0.37 10*3/mm3      Basophils, Absolute 0.05 10*3/mm3      Immature Grans, Absolute 0.11 10*3/mm3      nRBC 0.0 /100 WBC           Imaging Results (Last 24 Hours)     ** No results found for the last 24 hours. **          Xray not reviewed personally by physician.      ECG not reviewed personally by physician  ECG/EMG Results (most  recent)     Procedure Component Value Units Date/Time    ECG 12 Lead [[id number]] Collected: 10/08/20 1504     Updated: 10/09/20 1552    Narrative:      HEART RATE= 104  bpm  RR Interval= 576  ms  NC Interval= 195  ms  P Horizontal Axis= 33  deg  P Front Axis= 68  deg  QRSD Interval= 97  ms  QT Interval= 345  ms  QRS Axis= 45  deg  T Wave Axis= 25  deg  - OTHERWISE NORMAL ECG -  Sinus tachycardia  Ventricular premature complex  No change from prior tracing  Electronically Signed By: Rochelle Damico ([facility]) 09-Oct-2020 15:33:51  Date and Time of Study: 2020-10-08 15:04:36          Medication Review:   I have reviewed the patient's current medication list    Current Facility-Administered Medications:   •  apixaban (ELIQUIS) tablet 2.5 mg, 2.5 mg, Oral, Q12H, Adalberto Solo MD, 2.5 mg at 10/17/20 2055  •  bisacodyl (DULCOLAX) EC tablet 5 mg, 5 mg, Oral, Daily PRN, Adalberto Solo MD  •  bisacodyl (DULCOLAX) suppository 10 mg, 10 mg, Rectal, Daily PRN, Adalberto Solo MD  •  folic acid (FOLVITE) tablet 1 mg, 1 mg, Oral, Daily, Adalberto Solo MD, 1 mg at 10/17/20 0842  •  magnesium hydroxide (MILK OF MAGNESIA) suspension 2400 mg/10mL 10 mL, 10 mL, Oral, Daily PRN, Adalberto Solo MD, 10 mL at 10/13/20 1035  •  Magnesium Oxide tablet 400 mg, 400 mg, Oral, Daily, Adalberto Solo MD, 400 mg at 10/17/20 0842  •  melatonin tablet 5 mg, 5 mg, Oral, Nightly PRN, Adalberto Solo MD  •  multivitamin with minerals 1 tablet, 1 tablet, Oral, Daily, Adalberto Solo MD, 1 tablet at 10/17/20 0842  •  [] morphine injection 4 mg, 4 mg, Intravenous, Q4H PRN, 4 mg at 10/09/20 0916 **AND** naloxone (NARCAN) injection 0.4 mg, 0.4 mg, Intravenous, Q5 Min PRN, Adalberto Solo MD  •  nitroglycerin (NITROSTAT) SL tablet 0.4 mg, 0.4 mg, Sublingual, Q5 Min PRN, Adalberto Solo MD  •  ondansetron (ZOFRAN) tablet 4 mg, 4 mg, Oral, Q6H PRN **OR** ondansetron (ZOFRAN) injection 4 mg, 4 mg, Intravenous,  Q6H PRN, Adalberto Solo MD  •  ondansetron (ZOFRAN) tablet 4 mg, 4 mg, Oral, Q6H PRN **OR** ondansetron (ZOFRAN) injection 4 mg, 4 mg, Intravenous, Q6H PRN, Adalberto Solo MD  •  oxyCODONE-acetaminophen (PERCOCET) 7.5-325 MG per tablet 1 tablet, 1 tablet, Oral, Q4H PRN, Simone Root DO, 1 tablet at 10/15/20 0752  •  oxyCODONE-acetaminophen (PERCOCET) 7.5-325 MG per tablet 2 tablet, 2 tablet, Oral, Q4H PRN, Simone Root DO, 2 tablet at 10/17/20 2116  •  pantoprazole (PROTONIX) EC tablet 40 mg, 40 mg, Oral, Q AM, Jhoan Lazcano MD, 40 mg at 10/18/20 0603  •  [COMPLETED] Insert peripheral IV, , , Once **AND** sodium chloride 0.9 % flush 10 mL, 10 mL, Intravenous, PRN, Adalberto Solo MD  •  sodium chloride 0.9 % flush 10 mL, 10 mL, Intravenous, PRN, Adalberto Solo MD  •  sodium chloride 0.9 % flush 10 mL, 10 mL, Intravenous, Q12H, Adalberto Solo MD, 10 mL at 10/17/20 2055  •  sodium chloride 0.9 % infusion 40 mL, 40 mL, Intravenous, PREdil MEAD Nicholas A, MD      Assessment/Plan        Right hip fracture and surgery done/ S/P right hip nail              awaiting placement short-term rehab for gait training, strengthening range of motion.  Disposition per hospitalist we will follow-up in office 2 weeks postop with x-ray imaging right hip.     Alcohol abuse with recent admission to [facility] and seizure activity              Monitoring [facility]     Chronic pancytopenia / macrocytic anemia with coagulopathy     Recent ABLA and GIB/ Hgb 9.7 today     C diff infection              On oral vancomycin     Fatty liver disease     Dysphagia     Obesity/ Body mass index is 33.91 kg/m².       Plan for disposition:  Discharge soon to  Home or skilled care.  Discharge planners are working on placement.    Radha Daugherty DO  10/18/20  07:57 EDT      Time: 15 min

## 2020-10-19 ENCOUNTER — APPOINTMENT (OUTPATIENT)
Dept: ULTRASOUND IMAGING | Facility: HOSPITAL | Age: 50
End: 2020-10-19

## 2020-10-19 VITALS
RESPIRATION RATE: 20 BRPM | HEIGHT: 72 IN | TEMPERATURE: 98 F | BODY MASS INDEX: 33.86 KG/M2 | SYSTOLIC BLOOD PRESSURE: 139 MMHG | HEART RATE: 118 BPM | WEIGHT: 250 LBS | OXYGEN SATURATION: 98 % | DIASTOLIC BLOOD PRESSURE: 69 MMHG

## 2020-10-19 PROCEDURE — 99238 HOSP IP/OBS DSCHRG MGMT 30/<: CPT | Performed by: HOSPITALIST

## 2020-10-19 PROCEDURE — 97535 SELF CARE MNGMENT TRAINING: CPT

## 2020-10-19 PROCEDURE — 97116 GAIT TRAINING THERAPY: CPT

## 2020-10-19 PROCEDURE — 97110 THERAPEUTIC EXERCISES: CPT

## 2020-10-19 PROCEDURE — 93971 EXTREMITY STUDY: CPT

## 2020-10-19 RX ORDER — BISACODYL 5 MG/1
5 TABLET, DELAYED RELEASE ORAL DAILY PRN
Start: 2020-10-19 | End: 2020-12-03

## 2020-10-19 RX ORDER — PANTOPRAZOLE SODIUM 40 MG/1
40 TABLET, DELAYED RELEASE ORAL DAILY
Qty: 30 TABLET | Refills: 0 | Status: SHIPPED | OUTPATIENT
Start: 2020-10-19 | End: 2020-12-03

## 2020-10-19 RX ORDER — OXYCODONE AND ACETAMINOPHEN 7.5; 325 MG/1; MG/1
1 TABLET ORAL EVERY 4 HOURS PRN
Qty: 30 TABLET | Refills: 0 | Status: SHIPPED | OUTPATIENT
Start: 2020-10-19 | End: 2020-10-23

## 2020-10-19 RX ADMIN — OXYCODONE HYDROCHLORIDE AND ACETAMINOPHEN 2 TABLET: 7.5; 325 TABLET ORAL at 00:15

## 2020-10-19 RX ADMIN — SODIUM CHLORIDE, PRESERVATIVE FREE 10 ML: 5 INJECTION INTRAVENOUS at 08:48

## 2020-10-19 RX ADMIN — OXYCODONE HYDROCHLORIDE AND ACETAMINOPHEN 2 TABLET: 7.5; 325 TABLET ORAL at 07:36

## 2020-10-19 RX ADMIN — OXYCODONE HYDROCHLORIDE AND ACETAMINOPHEN 2 TABLET: 7.5; 325 TABLET ORAL at 12:31

## 2020-10-19 RX ADMIN — Medication 400 MG: at 08:47

## 2020-10-19 RX ADMIN — FOLIC ACID 1 MG: 1 TABLET ORAL at 08:47

## 2020-10-19 RX ADMIN — APIXABAN 2.5 MG: 2.5 TABLET, FILM COATED ORAL at 08:47

## 2020-10-19 RX ADMIN — PANTOPRAZOLE SODIUM 40 MG: 40 TABLET, DELAYED RELEASE ORAL at 05:53

## 2020-10-19 RX ADMIN — MULTIPLE VITAMINS W/ MINERALS TAB 1 TABLET: TAB at 08:47

## 2020-10-19 NOTE — THERAPY TREATMENT NOTE
Acute Care - Physical Therapy Treatment Note  SUSANNA HammWakita     Patient Name: Simone Boykin  : 1970  MRN: [id number]  Today's Date: 10/19/2020   Onset of Illness/Injury or Date of Surgery: 10/08/20       PT Assessment (last 12 hours)      PT Evaluation and Treatment     Row Name 10/19/20 0932          Physical Therapy Time and Intention    Subjective Information  complains of;pain;fatigue  -LN     Document Type  therapy note (daily note)  -LN     Mode of Treatment  physical therapy  -LN     Patient Effort  adequate  -LN     Symptoms Noted During/After Treatment  fatigue  -LN     Comment  Patient c/o pain in right patella tendon.  He continues to need extended time to complete mobility/activities.   -LN     Row Name 10/19/20 0932          Cognition    Personal Safety Interventions  gait belt;nonskid shoes/slippers when out of bed;supervised activity  -LN     Row Name 10/19/20 0932          Pain Scale: Numbers Pre/Post-Treatment    Pre/Posttreatment Pain Comment  Patient c/o pain in right knee but was not rated.  -LN     Row Name 10/19/20 0932          Pain Scale: Word Pre/Post-Treatment    Pre/Posttreatment Pain Comment  Patient applied 3 salonpas pads to right knee while seated at EOB.   -LN     Pain Intervention(s)  Repositioned;Ambulation/increased activity  -LN     Row Name 10/19/20 0932          Mobility    Extremity Weight-bearing Status  right lower extremity  -LN     Right Lower Extremity (Weight-bearing Status)  partial weight-bearing (PWB)  -LN     Row Name 10/19/20 0932          Bed Mobility    Bed Mobility  supine-sit  -LN     Supine-Sit [place] (Bed Mobility)  minimum assist (75% patient effort);other (see comments) primarily with right leg  -LN     Bed Mobility, Safety Issues  decreased use of legs for bridging/pushing  -LN     Assistive Device (Bed Mobility)  bed rails;head of bed elevated  -LN     Row Name 10/19/20 0932          Transfers    Transfers  sit-stand transfer;stand-sit  transfer  -LN     Maintains Weight-bearing Status (Transfers)  able to maintain maintains PWB on right leg well.  -LN     Comment (Transfers)  verbal cueing for proper hand placement.  -LN     Sit-Stand [place] (Transfers)  contact guard;verbal cues  -LN     Stand-Sit [place] (Transfers)  contact guard;verbal cues  -LN     Row Name 10/19/20 0932          Sit-Stand Transfer    Assistive Device (Sit-Stand Transfers)  walker, front-wheeled  -LN     Row Name 10/19/20 0932          Stand-Sit Transfer    Assistive Device (Stand-Sit Transfers)  walker, front-wheeled  -LN     Row Name 10/19/20 0932          Gait/Stairs (Locomotion)    Gait/Stairs Locomotion  gait/ambulation independence;gait/ambulation assistive device;distance ambulated  -LN     [place] Level (Gait)  minimum assist (75% patient effort);contact guard;1 person assist;1 person to manage equipment;verbal cues;nonverbal cues (demo/gesture)  -LN     Assistive Device (Gait)  walker, front-wheeled  -LN     Distance in Feet (Gait)  7 feet   -LN     Deviations/Abnormal Patterns (Gait)  base of support, wide;gait speed decreased;stride length decreased;weight shifting decreased  -LN     Bilateral Gait Deviations  forward flexed posture  -LN     Right Sided Gait Deviations  forward flexed posture;heel strike decreased tremors B UE and LE  -LN     Comment (Gait/Stairs)  Patient continues with tremors B UE & LE which affects his mobility and gait and limits distance he can ambulate. He needs verbal cueing and occas. physical assist to manage walker  and cueing to increase his stride length on right. He does maintain PWB right leg well.   -LN     Row Name 10/19/20 0932          Safety Issues, Functional Mobility    Safety Issues Affecting Function (Mobility)  positioning of assistive device  -LN     Impairments Affecting Function (Mobility)  balance;endurance/activity tolerance;strength;other (see comments) tremors  -LN     Comment, Safety  Issues/Impairments (Mobility)  needs occas. cueing/assist for safe positioning of walker.  -LN     Row Name 10/19/20 0932          Motor Skills    Therapeutic Exercise  -- instructed pt in 10 reps AP/QS/GS.  -LN                            Row Name 10/19/20 0932          Plan of Care Review    Plan of Care Reviewed With  patient  -LN     Outcome Summary  PT: Patient needed minimal assist to transfer supine to sit, primarily for right leg with use of bed rail and HOB elevated. He transfers sit to stand with CGA. He was able to ambulate 7 feet from bed to chair with FWW, PWB right leg and minimal assist.  Patient continues with tremors B UE & LE which affects his mobility and gait and limits distance he can ambulate. He needs verbal cueing and occas. physical assist to manage walker  and cueing to increase his stride length on right. He does maintain PWB right leg well.  Instructed patient in 10 reps AP, QS, and GS. Will continue to follow patient daily while in hospital. Plan is for discharge to the [facility] today for STR.   -LN     Row Name 10/19/20 0932          Progress Summary (PT)    Progress Toward Functional Goals (PT)  progress toward functional goals is fair  -LN     Barriers to Overall Progress (PT)  B UE and LE tremors  -LN       User Key  (r) = Recorded By, (t) = Taken By, (c) = Cosigned By    Initials Name Provider Type    Nanette Hughes, PT Physical Therapist              Physical Therapy Education                 Title: PT OT SLP Therapies (Done)     Topic: Physical Therapy (Done)     Point: Mobility training (Done)     Learning Progress Summary           Patient Acceptance, E,TB,D, VU,NR by LINDSAY at 10/19/2020 1237    Comment: Conitnued education on functional mobility and gait with FWW, PWB right. Instructed patient in 10 reps AP/QS/GS.  He continues to need cueing for safe positioning of walker and to increase his stride length.    Acceptance, E,TB, VU,NR by BRYAN at 10/18/2020 1010     Acceptance, E,TB, NR by BP at 10/17/2020 1018    Acceptance, E,TB, NR by BP at 10/16/2020 1016    Acceptance, E,TB, NR by JW at 10/15/2020 1315    Acceptance, E,TB, NR by BP at 10/14/2020 1104    Acceptance, E, VU by AB at 10/13/2020 0529    Acceptance, E,TB, VU by LH at 10/12/2020 1540    Acceptance, E,TB, VU by  at 10/12/2020 1201    Acceptance, E, VU by LN at 10/10/2020 1256    Comment: Education on functional mobility and gait with FWW, PWB right leg.                   Point: Home exercise program (Done)     Learning Progress Summary           Patient Acceptance, E,TB,D, VU,NR by LN at 10/19/2020 1237    Comment: Conitnued education on functional mobility and gait with FWW, PWB right. Instructed patient in 10 reps AP/QS/GS.  He continues to need cueing for safe positioning of walker and to increase his stride length.                   Point: Body mechanics (Done)     Learning Progress Summary           Patient Acceptance, E,TB,D, VU,NR by LN at 10/19/2020 1237    Comment: Conitnued education on functional mobility and gait with FWW, PWB right. Instructed patient in 10 reps AP/QS/GS.  He continues to need cueing for safe positioning of walker and to increase his stride length.    Acceptance, E, VU by AB at 10/13/2020 0529    Acceptance, E, VU by LN at 10/10/2020 1256    Comment: Education on functional mobility and gait with FWW, PWB right leg.                   Point: Precautions (Done)     Learning Progress Summary           Patient Acceptance, E,TB,D, VU,NR by LN at 10/19/2020 1237    Comment: Conitnued education on functional mobility and gait with FWW, PWB right. Instructed patient in 10 reps AP/QS/GS.  He continues to need cueing for safe positioning of walker and to increase his stride length.    Acceptance, E,TB, VU,NR by JW at 10/18/2020 1010    Acceptance, E,TB, NR by BP at 10/17/2020 1018    Acceptance, E,TB, NR by BP at 10/16/2020 1016    Acceptance, E,TB, NR by JW at 10/15/2020 1315    Acceptance,  E,TB, NR by  at 10/14/2020 1104    Acceptance, E, VU by AB at 10/13/2020 0529    Acceptance, E,TB, VU by  at 10/12/2020 1540    Acceptance, E,TB, VU by  at 10/12/2020 1201    Acceptance, E, VU by LN at 10/10/2020 1256    Comment: Education on functional mobility and gait with FWW, PWB right leg.                               User Key     Initials Effective Dates Name Provider Type Discipline     06/08/18 -  Graciela Oliveira, PT Physical Therapist PT    AB 06/16/16 -  Marina Hendrix, RN Registered Nurse Nurse    LN 08/09/20 -  Nanette Rose, PT Physical Therapist PT    BP 04/03/18 -  Jaleel Laird, PT Physical Therapist PT     04/03/18 -  Sapphire Anthony, PT Physical Therapist PT              PT Recommendation and Plan  Planned Therapy Interventions (PT): bed mobility training, gait training, home exercise program, patient/family education, ROM (range of motion), stair training, strengthening  Therapy Frequency (PT): other (see comments)(2 x day; daily on Sat and Sunday)  Progress Summary (PT)  Progress Toward Functional Goals (PT): progress toward functional goals is fair  Barriers to Overall Progress (PT): B UE and LE tremors  Plan of Care Reviewed With: patient  Outcome Summary: PT: Patient needed minimal assist to transfer supine to sit, primarily for right leg with use of bed rail and HOB elevated. He transfers sit to stand with CGA. He was able to ambulate 7 feet from bed to chair with FWW, PWB right leg and minimal assist.  Patient continues with tremors B UE & LE which affects his mobility and gait and limits distance he can ambulate. He needs verbal cueing and occas. physical assist to manage walker  and cueing to increase his stride length on right. He does maintain PWB right leg well.  Instructed patient in 10 reps AP, QS, and GS. Will continue to follow patient daily while in hospital. Plan is for discharge to the [facility] today for STR.        Outcome Measures     Row  Name 10/19/20 0933 10/19/20 0932 10/18/20 0935       How much help from another person do you currently need...    Turning from your back to your side while in flat bed without using bedrails?  --  3  -LN  3  -JW    Moving from lying on back to sitting on the side of a flat bed without bedrails?  --  3  -LN  3  -JW    Moving to and from a bed to a chair (including a wheelchair)?  --  3  -LN  3  -JW    Standing up from a chair using your arms (e.g., wheelchair, bedside chair)?  --  3  -LN  2  -JW    Climbing 3-5 steps with a railing?  --  1  -LN  1  -JW    To walk in hospital room?  --  3  -LN  3  -JW    AM-PAC 6 Clicks Score (PT)  --  16  -LN  15  -JW       How much help from another is currently needed...    Putting on and taking off regular lower body clothing?  3  -EN  --  --    Bathing (including washing, rinsing, and drying)  3  -EN  --  --    Toileting (which includes using toilet bed pan or urinal)  3  -EN  --  --    Putting on and taking off regular upper body clothing  4  -EN  --  --    Taking care of personal grooming (such as brushing teeth)  4  -EN  --  --    Eating meals  4  -EN  --  --    AM-PAC 6 Clicks Score (OT)  21  -EN  --  --       Functional Assessment    Outcome Measure Options  --  AM-PAC 6 Clicks Basic Mobility (PT)  -LN  AM-PAC 6 Clicks Basic Mobility (PT)  -JW    Row Name 10/17/20 0915             How much help from another person do you currently need...    Turning from your back to your side while in flat bed without using bedrails?  3  -BP      Moving from lying on back to sitting on the side of a flat bed without bedrails?  2  -BP      Moving to and from a bed to a chair (including a wheelchair)?  2  -BP      Standing up from a chair using your arms (e.g., wheelchair, bedside chair)?  2  -BP      Climbing 3-5 steps with a railing?  1  -BP      To walk in hospital room?  2  -BP      AM-PAC 6 Clicks Score (PT)  12  -BP         Functional Assessment    Outcome Measure Options  AM-PAC 6  Clicks Basic Mobility (PT)  -BP        User Key  (r) = Recorded By, (t) = Taken By, (c) = Cosigned By    Initials Name Provider Type    EN Rosalba Voss, OTR Occupational Therapist    Nanette Hughes, PT Physical Therapist    BP Jaleel Laird, PT Physical Therapist    Sapphire Velázquez, PT Physical Therapist           Time Calculation:   PT Charges     Row Name 10/19/20 1240             Time Calculation    Start Time  0932  -LN      Stop Time  0957  -LN      Time Calculation (min)  25 min  -LN        User Key  (r) = Recorded By, (t) = Taken By, (c) = Cosigned By    Initials Name Provider Type    Nanette Hughes, PT Physical Therapist        Therapy Charges for Today     Code Description Service Date Service Provider Modifiers Qty    24718353725 HC PT THER PROC EA 15 MIN 10/19/2020 Nanette Rose, PT GP 1    44915438793 HC GAIT TRAINING EA 15 MIN 10/19/2020 Nanette Rose, PT GP 1          PT G-Codes  Outcome Measure Options: AM-PAC 6 Clicks Basic Mobility (PT)  AM-PAC 6 Clicks Score (PT): 16  AM-PAC 6 Clicks Score (OT): 21    Nanette Rose, PT  10/19/2020

## 2020-10-19 NOTE — DISCHARGE SUMMARY
"Simone Boykin  1970  [id number]    Hospitalists Discharge Summary    Date of Admission: 10/8/2020  Date of Discharge:  10/19/2020    Primary Discharge Diagnoses:  1.  Right intertrochanteric hip fracture s/p IM nailing.  2.  Acute Blood Loss Anemia on Chronic Macrocytic Anemia    Secondary Discharge Diagnoses:  1.  EtoH Abuse w/ Dependence  2.  Reflux Esophagitis  3.  Hx/O C-diff colitis w/ completion of oral Vancomycin  4.  Chronic Pancytopenia  5.  Hx/O GI Bleed  6.  Obesity w/ BMI of 33.91    History of Present Illness (taken from H&P):  The patient is a 50-year-old male that presented to the emergency department secondary to ground-level fall onto right hip with immediate pain and in ability to bear weight.  He notes that he was \"walking with his walker, turned and wheel caught, tripping me\".  He states that he was at the pharmacy picking up his medications prescribed at discharge 2 days ago and states \"there must have been at least 12 medicines\".  He reports that he was discharged from [facility] for seizures and concussion on 10/6/2020 after falling due to tripping over his dog.  He states \"supposedly I have some disease that they did not even tell me about called ADD\".  He reports that he also had endoscopy but \"I do not know why\".     Review of [place] records shows admission from 9/28/2022 10/6/2020 alcohol withdrawal, delirium, GI bleed, PETER as discharge diagnoses.      EGD showed reflux esophagitis  Echo showed hyperdynamic LV with EF 79%  EEG showed no seizure activity  Discharge summary shows:   \"1. Alcohol withdrawal seizure  - seizure likely secondary to acute alcohol withdrawal, no further seizures noted since admission  - no plan for AEDs per neurology     2. Alcohol withdrawal/dependence, prophylactic treatment of suspected thiamine deficiency  - sp librium with wean  - continue multivitamin, thiamine, folic acid supplementation  - counseled on alcohol cessation     3. Melena, Acute " "blood loss anemia  - sp upper endoscopy with GI 10/6 with grade B reflux esophagitis  - continue twice daily PPI x 3 months     4. Transaminitis  - likely secondary to alcohol use, AST/ALT wnl at time of discharge  - RUQ ultrasound shows diffuse fatty infiltration of the liver     5. C. Difficile infection  - continue oral vancomycin x 14 days (end 10/14)     6. Non-MI troponin elevation secondary to above  - mild elevation with nonspecific ST-T wave changes noted on EKG  - echo with no regional wall motion abnormalities, EF 79%     7. Acute kidney injury, resolved  - Likely prerenal, improved with IVF, avoid nephrotoxins     8. Electrolyte abnormalities (hypokalemia, hypophosphatemia, hypomagnesemia, hypocalcemia)  - continued scheduled Mag, Calcium, vitamin D     9. Dysphagia, concern for aspiration   - evaluated by speech therapy, recommendation for mechanical soft and thin liquids    10. Pancytopenia, mild  - likely related to alcohol use with underlying liver disease, nutritional workup per PCP    11. Generalized weakness  - evaluated by PT who recommended acute rehab, patient declined, aware of risks including falls\"        He states that he quit his job to move here to help with his dad who had quadruple bypass.  He reports occasional 2 beer per night alcohol intake, none recently and none daily.  He states last drink was 2 weeks ago and was only 2 beers.     He otherwise denies f/c/headache/rhinorrhea/nasal congestion/lightheadedness/syncopal sensation/cough/soa/n/v/d/chest pain/abdominal pain/recent illness/sick exposures/change in bowel or bladder habits/no weight change/bloody emesis or bloody stools/change in medications or any other new concerns.       Hospital Course:  Mr. Boykin was admitted to the Med/Surg unit.  He was seen in consultation by Dr. Solo, proceeding to the OR for IM nailing of the fracture.  He was maintained on tapering Librium and CIWA protocol.  He completed his course of oral " vancomycin.  No further diarrhea noted.  He did require 2 units of PRBC's.  He was also seen in consultation by GI.  No intervention planned as Hgb stabilized.  He is now being transferred to [place] for rehab.    PCP  Patient Care Team:  Liza Sapp PA-C as PCP - General (Family Medicine)    Consults:   Consults     Date and Time Order Name Status Description    10/11/2020 0756 Inpatient Gastroenterology Consult Completed     10/8/2020 1955 Inpatient Orthopedic Surgery Consult Completed           Operations and Procedures Performed:  Procedure(s):  HIP INTERTROCHANTERIC NAILING     Xr Femur 2 View Right    Result Date: 10/8/2020  Narrative: 10/08/2020: 1. RIGHT HIP. 2. RIGHT FEMUR.     HISTORY: 50-year-old male in the ED with right hip pain after a fall today.  TECHNIQUE: Two view right femur series. Two-view right hip series.  FINDINGS: There is a comminuted, mildly displaced intertrochanteric fracture of the right femur involving both the greater and lesser trochanters. Slight varus angulation. Femoral head and neck appear intact. There is no visible fracture of the acetabulum or right hemipelvis.  Mid and distal portions of the right femur show no acute osseous abnormality. Moderate degenerative arthropathy at the right knee.      Impression: Comminuted intertrochanteric right femur fracture.  This report was finalized on 10/8/2020 3:07 PM by Dr. Fermin Choi MD.      Xr Chest 1 View    Result Date: 10/8/2020  Narrative: CHEST X-RAY, 10/08/2020     HISTORY: 50-year-old male in the ED with acute intertrochanteric right femur fracture after a fall.  TECHNIQUE: AP portable chest x-ray.  FINDINGS: Cardiomediastinal silhouette is within normal limits. Shallow lung expansion. The lungs appear clear. No visible pneumothorax, pulmonary infiltrate or pleural effusion.      Impression: Negative chest.  This report was finalized on 10/8/2020 3:09 PM by Dr. Fermin Choi MD.      Xr Hip With Or Without  "Pelvis 2 - 3 View Right    Result Date: 10/9/2020  Narrative: CR Hip Uni Comp Min 2 Vws RT INDICATION: Intraoperative radiograph COMPARISON: None. FINDINGS: Series of fluoroscopic images of open reduction internal fixation of the right femur. See operative report. IMPRESSION Series of fluoroscopic images of open reduction internal fixation of the right femur. See operative report Signer Name: AMY VIEIRA MD  Signed: 10/9/2020 6:33 PM  Workstation [facility]: [email or web address]    Xr Hip With Or Without Pelvis 2 - 3 View Right    Result Date: 10/8/2020  Narrative: 10/08/2020: 1. RIGHT HIP. 2. RIGHT FEMUR.     HISTORY: 50-year-old male in the ED with right hip pain after a fall today.  TECHNIQUE: Two view right femur series. Two-view right hip series.  FINDINGS: There is a comminuted, mildly displaced intertrochanteric fracture of the right femur involving both the greater and lesser trochanters. Slight varus angulation. Femoral head and neck appear intact. There is no visible fracture of the acetabulum or right hemipelvis.  Mid and distal portions of the right femur show no acute osseous abnormality. Moderate degenerative arthropathy at the right knee.      Impression: Comminuted intertrochanteric right femur fracture.  This report was finalized on 10/8/2020 3:07 PM by Dr. Fermin Choi MD.        Allergies:  is allergic to melatonin.    Chris  reviewed    Discharge Medications:     Discharge Medications      New Medications      Instructions Start Date   apixaban 2.5 MG tablet tablet  Commonly known as: ELIQUIS   2.5 mg, Oral, Every 12 Hours Scheduled         ASK your doctor about these medications      Instructions Start Date   folic acid 1 MG tablet  Commonly known as: FOLVITE   1 mg, Oral, Daily      Rebecca Root 250 MG capsule   250 mg, Oral, Daily, Patient states he is unsure of dosage. \"whatever they sell at [employer]\"       Magnesium Oxide 400 (240 Mg) MG tablet   400 mg, Oral, " Daily      multivitamin and minerals liquid liquid   Oral, Daily      vancomycin 125 MG capsule  Commonly known as: VANCOCIN  Ask about: Should I take this medication?   125 mg, Oral, 4 Times Daily             Last Lab Results:   Lab Results (most recent)     Procedure Component Value Units Date/Time    POC Glucose Once [[id number]]  (Normal) Collected: 10/18/20 0724    Specimen: Blood Updated: 10/18/20 0741     Glucose 123 mg/dL     Basic Metabolic Panel [[id number]]  (Abnormal) Collected: 10/17/20 0843    Specimen: Blood Updated: 10/17/20 0944     Glucose 116 mg/dL      BUN 8 mg/dL      Creatinine 0.74 mg/dL      Sodium 134 mmol/L      Potassium 4.7 mmol/L      Comment: Slight hemolysis detected by analyzer. Results may be affected.        Chloride 101 mmol/L      CO2 22.0 mmol/L      Calcium 8.7 mg/dL      eGFR Non African Amer 112 mL/min/1.73      BUN/Creatinine Ratio 10.8     Anion Gap 11.0 mmol/L     Narrative:      GFR Normal >60  Chronic Kidney Disease <60  Kidney Failure <15      CBC & Differential [[id number]]  (Abnormal) Collected: 10/17/20 0843    Specimen: Blood Updated: 10/17/20 0928    Narrative:      The following orders were created for panel order CBC & Differential.  Procedure                               Abnormality         Status                     ---------                               -----------         ------                     CBC Auto Differential[[id number]]        Abnormal            Final result                 Please view results for these tests on the individual orders.    CBC Auto Differential [[id number]]  (Abnormal) Collected: 10/17/20 0843    Specimen: Blood Updated: 10/17/20 0928     WBC 6.45 10*3/mm3      RBC 3.16 10*6/mm3      Hemoglobin 9.7 g/dL      Hematocrit 32.2 %      .9 fL      MCH 30.7 pg      MCHC 30.1 g/dL      RDW 15.2 %      RDW-SD 57.2 fl      MPV 10.9 fL      Platelets 175 10*3/mm3      Neutrophil % 60.7 %      Lymphocyte % 19.2 %      Monocyte % 11.9 %       Eosinophil % 5.7 %      Basophil % 0.8 %      Immature Grans % 1.7 %      Neutrophils, Absolute 3.91 10*3/mm3      Lymphocytes, Absolute 1.24 10*3/mm3      Monocytes, Absolute 0.77 10*3/mm3      Eosinophils, Absolute 0.37 10*3/mm3      Basophils, Absolute 0.05 10*3/mm3      Immature Grans, Absolute 0.11 10*3/mm3      nRBC 0.0 /100 WBC     POC Glucose Once [[id number]]  (Normal) Collected: 10/17/20 0734    Specimen: Blood Updated: 10/17/20 0744     Glucose 86 mg/dL     Basic Metabolic Panel [[id number]]  (Abnormal) Collected: 10/13/20 0758    Specimen: Blood Updated: 10/13/20 0835     Glucose 121 mg/dL      BUN 8 mg/dL      Creatinine 0.75 mg/dL      Sodium 136 mmol/L      Potassium 3.6 mmol/L      Chloride 104 mmol/L      CO2 24.8 mmol/L      Calcium 8.3 mg/dL      eGFR Non African Amer 110 mL/min/1.73      BUN/Creatinine Ratio 10.7     Anion Gap 7.2 mmol/L     Narrative:      GFR Normal >60  Chronic Kidney Disease <60  Kidney Failure <15      Hemoglobin & Hematocrit, Blood [[id number]]  (Abnormal) Collected: 10/13/20 0758    Specimen: Blood Updated: 10/13/20 0807     Hemoglobin 8.6 g/dL      Hematocrit 27.3 %     Hemoglobin & Hematocrit, Blood [[id number]]  (Abnormal) Collected: 10/12/20 0441    Specimen: Blood Updated: 10/12/20 0526     Hemoglobin 8.5 g/dL      Hematocrit 27.2 %     Comprehensive Metabolic Panel [[id number]]  (Abnormal) Collected: 10/11/20 0424    Specimen: Blood Updated: 10/11/20 0512     Glucose 137 mg/dL      BUN 11 mg/dL      Creatinine 0.82 mg/dL      Sodium 137 mmol/L      Potassium 4.1 mmol/L      Chloride 106 mmol/L      CO2 22.2 mmol/L      Calcium 8.0 mg/dL      Total Protein 5.3 g/dL      Albumin 2.60 g/dL      ALT (SGPT) 15 U/L      AST (SGOT) 26 U/L      Alkaline Phosphatase 52 U/L      Total Bilirubin 0.7 mg/dL      eGFR Non African Amer 99 mL/min/1.73      Globulin 2.7 gm/dL      A/G Ratio 1.0 g/dL      BUN/Creatinine Ratio 13.4     Anion Gap 8.8 mmol/L     Narrative:      GFR Normal  >60  Chronic Kidney Disease <60  Kidney Failure <15      Magnesium [[id number]]  (Normal) Collected: 10/11/20 0424    Specimen: Blood Updated: 10/11/20 0512     Magnesium 1.8 mg/dL     CBC & Differential [[id number]]  (Abnormal) Collected: 10/11/20 0424    Specimen: Blood Updated: 10/11/20 0500    Narrative:      The following orders were created for panel order CBC & Differential.  Procedure                               Abnormality         Status                     ---------                               -----------         ------                     CBC Auto Differential[[id number]]        Abnormal            Final result                 Please view results for these tests on the individual orders.    CBC Auto Differential [[id number]]  (Abnormal) Collected: 10/11/20 0424    Specimen: Blood Updated: 10/11/20 0500     WBC 9.00 10*3/mm3      RBC 2.06 10*6/mm3      Hemoglobin 6.5 g/dL      Hematocrit 20.9 %      .5 fL      MCH 31.6 pg      MCHC 31.1 g/dL      RDW 14.9 %      RDW-SD 54.1 fl      MPV 10.2 fL      Platelets 145 10*3/mm3      Neutrophil % 78.0 %      Lymphocyte % 10.0 %      Monocyte % 11.2 %      Eosinophil % 0.3 %      Basophil % 0.1 %      Immature Grans % 0.4 %      Neutrophils, Absolute 7.01 10*3/mm3      Lymphocytes, Absolute 0.90 10*3/mm3      Monocytes, Absolute 1.01 10*3/mm3      Eosinophils, Absolute 0.03 10*3/mm3      Basophils, Absolute 0.01 10*3/mm3      Immature Grans, Absolute 0.04 10*3/mm3      nRBC 0.0 /100 WBC     Vitamin B12 [[id number]]  (Abnormal) Collected: 10/09/20 0958    Specimen: Blood Updated: 10/09/20 1706     Vitamin B-12 1,317 pg/mL     Narrative:      Results may be falsely increased if patient taking Biotin.      Folate [[id number]]  (Normal) Collected: 10/09/20 0958    Specimen: Blood Updated: 10/09/20 1706     Folate 15.90 ng/mL     Narrative:      Results may be falsely increased if patient taking Biotin.      Fibrinogen [[id number]]  (Normal) Collected: 10/09/20 0958     Specimen: Blood Updated: 10/09/20 1043     Fibrinogen 343 mg/dL     Protime-INR [[id number]]  (Abnormal) Collected: 10/09/20 0958    Specimen: Blood Updated: 10/09/20 1035     Protime 17.0 Seconds      INR 1.44    Narrative:      Therapeutic Ranges for INR: 2.0-3.0 (PT 20-30)                              2.5-3.5 (PT 25-34)    Comprehensive Metabolic Panel [[id number]]  (Abnormal) Collected: 10/09/20 0958    Specimen: Blood Updated: 10/09/20 1025     Glucose 91 mg/dL      BUN 10 mg/dL      Creatinine 0.76 mg/dL      Sodium 134 mmol/L      Potassium 3.7 mmol/L      Chloride 104 mmol/L      CO2 21.2 mmol/L      Calcium 7.9 mg/dL      Total Protein 5.4 g/dL      Albumin 2.50 g/dL      ALT (SGPT) 16 U/L      AST (SGOT) 24 U/L      Alkaline Phosphatase 59 U/L      Total Bilirubin 1.1 mg/dL      eGFR Non African Amer 108 mL/min/1.73      Globulin 2.9 gm/dL      A/G Ratio 0.9 g/dL      BUN/Creatinine Ratio 13.2     Anion Gap 8.8 mmol/L     Narrative:      GFR Normal >60  Chronic Kidney Disease <60  Kidney Failure <15      COVID PRE-OP / PRE-PROCEDURE SCREENING ORDER (NO ISOLATION) - Swab, Nasopharynx [[id number]]  (Normal) Collected: 10/08/20 2103    Specimen: Swab from Nasopharynx Updated: 10/09/20 0054    Narrative:      The following orders were created for panel order COVID PRE-OP / PRE-PROCEDURE SCREENING ORDER (NO ISOLATION) - Swab, Nasopharynx.  Procedure                               Abnormality         Status                     ---------                               -----------         ------                     Respiratory Panel PCR w/...[[id number]]  Normal              Final result                 Please view results for these tests on the individual orders.    Respiratory Panel PCR w/COVID-19(SARS-CoV-2) MICHAEL/ALTAF/KYLE/PAD/COR/MAD In-House, NP Swab in UTM/VTM, 3-4 HR TAT - Swab, Nasopharynx [[id number]]  (Normal) Collected: 10/08/20 2103    Specimen: Swab from Nasopharynx Updated: 10/09/20 0054     ADENOVIRUS, PCR  Not Detected     Coronavirus 229E Not Detected     Coronavirus HKU1 Not Detected     Coronavirus NL63 Not Detected     Coronavirus OC43 Not Detected     COVID19 Not Detected     Human Metapneumovirus Not Detected     Human Rhinovirus/Enterovirus Not Detected     Influenza A PCR Not Detected     Influenza A H1 Not Detected     Influenza A H1 2009 PCR Not Detected     Influenza A H3 Not Detected     Influenza B PCR Not Detected     Parainfluenza Virus 1 Not Detected     Parainfluenza Virus 2 Not Detected     Parainfluenza Virus 3 Not Detected     Parainfluenza Virus 4 Not Detected     RSV, PCR Not Detected     Bordetella pertussis pcr Not Detected     Bordetella parapertussis PCR Not Detected     Chlamydophila pneumoniae PCR Not Detected     Mycoplasma pneumo by PCR Not Detected    Narrative:      Fact sheet for providers: https://docs.[employer]/wp-content/uploads/JAU1586-9222-BH9.1-EUA-Provider-Fact-Sheet-3.pdf    Fact sheet for patients: https://[employer]/wp-content/uploads/PHG8614-4888-AK8.1-EUA-Patient-Fact-Sheet-1.pdf  Fact sheet for providers: https://[employer]/wp-content/uploads/TBS9673-8895-ZP4.1-EUA-Provider-Fact-Sheet-3.pdf    Fact sheet for patients: https://[employer]/wp-content/uploads/QGB5345-4400-IK1.1-EUA-Patient-Fact-Sheet-1.pdf    Hemoglobin A1c [[id number]]  (Normal) Collected: 10/08/20 1458    Specimen: Blood Updated: 10/08/20 2119     Hemoglobin A1C 4.89 %     Narrative:      Hemoglobin A1C Ranges:    Increased Risk for Diabetes  5.7% to 6.4%  Diabetes                     >= 6.5%  Diabetic Goal                < 7.0%    TSH [[id number]]  (Normal) Collected: 10/08/20 1458    Specimen: Blood Updated: 10/08/20 2049     TSH 0.845 uIU/mL     CK [[id number]]  (Normal) Collected: 10/08/20 1458    Specimen: Blood Updated: 10/08/20 2042     Creatine Kinase 73 U/L     Iron Profile [[id number]]  (Abnormal) Collected: 10/08/20 1458    Specimen: Blood Updated: 10/08/20 2042     Iron 44  mcg/dL      Iron Saturation 22 %      UIBC 160 mcg/dL      TIBC 204 mcg/dL     Ferritin [[id number]]  (Normal) Collected: 10/08/20 1458    Specimen: Blood Updated: 10/08/20 2042     Ferritin 263.00 ng/mL     Narrative:      Results may be falsely decreased if patient taking Biotin.      Ethanol [[id number]] Collected: 10/08/20 1458    Specimen: Blood Updated: 10/08/20 1539     Ethanol <10 mg/dL      Ethanol % <0.010 %     Protime-INR [[id number]]  (Abnormal) Collected: 10/08/20 1458    Specimen: Blood Updated: 10/08/20 1528     Protime 16.9 Seconds      INR 1.42    Narrative:      Therapeutic Ranges for INR: 2.0-3.0 (PT 20-30)                              2.5-3.5 (PT 25-34)    aPTT [[id number]]  (Normal) Collected: 10/08/20 1458    Specimen: Blood Updated: 10/08/20 1528     PTT 33.3 seconds     Narrative:      PTT = The equivalent PTT values for the therapeutic range of heparin levels at 0.1 to 0.7 U/ml are 53 to 110 seconds.      Urine Drug Screen - Urine, Clean Catch [[id number]]  (Abnormal) Collected: 10/08/20 1445    Specimen: Urine, Clean Catch Updated: 10/08/20 1501     THC, Screen, Urine Negative     Phencyclidine (PCP), Urine Negative     Cocaine Screen, Urine Negative     Methamphetamine, Ur Negative     Opiate Screen Negative     Amphetamine Screen, Urine Negative     Benzodiazepine Screen, Urine Positive     Tricyclic Antidepressants Screen Negative     Methadone Screen, Urine Negative     Barbiturates Screen, Urine Negative     Oxycodone Screen, Urine Negative     Propoxyphene Screen Negative     Buprenorphine, Screen, Urine Negative    Narrative:      Urine drug screen results are to be used for medical purposes only.  They are not to be used for legal purposes such as employment testing.  Negative results do not necessarily mean the complete absence of a subtance, but rather that the result is less than the cutoff for that substance.  Positive results are unconfirmed and considered Preliminary Positive.   [facility] does not automatically confirm Postitive Unconfirmed results.  The physician may request (order) an Unconfirmed Positive result to be sent out for confirmation.      Negative Thresholds for Drugs Screened:    THC screen, urine                          50 ng/ml  Phenycyclidine (PCP), urine                25 ng/ml  Cocaine screen, urine                     150 ng/ml  Methamphetamine, urine                    500 ng/ml  Opiate screen, urine                      100 ng/ml  Amphetamine screen, urine                 500 ng/ml  Benzodiazepine screen, urine              150 ng/ml  Tricyclic Antidepressants screen, urine   300 ng/ml  Methadone screen, urine                   200 ng/ml  Barbiturates screen, urine                200 ng/ml  Oxycodone screen, urine                   100 ng/ml  Propoxyphene screen, urine                300 ng/ml  Buprenorphine screen, urine                10 ng/ml        Imaging Results (Most Recent)     Procedure Component Value Units Date/Time    FL C Arm During Surgery [[id number]] Resulted: 10/12/20 1649     Updated: 10/12/20 1649    XR Hip With or Without Pelvis 2 - 3 View Right [[id number]] Collected: 10/09/20 1833     Updated: 10/09/20 1836    Narrative:      CR Hip Uni Comp Min 2 Vws RT    INDICATION:   Intraoperative radiograph    COMPARISON:   None.    FINDINGS:  Series of fluoroscopic images of open reduction internal fixation of the right femur. See operative report.    IMPRESSION  Series of fluoroscopic images of open reduction internal fixation of the right femur. See operative report    Signer Name: AMY VIEIRA MD   Signed: 10/9/2020 6:33 PM   Workstation Name: [id number]    [facility]    XR Chest 1 View [[id number]] Collected: 10/08/20 1509     Updated: 10/08/20 1512    Narrative:      CHEST X-RAY, 10/08/2020         HISTORY:  50-year-old male in the ED with acute intertrochanteric right femur  fracture after a fall.      TECHNIQUE:  AP portable chest x-ray.     FINDINGS:  Cardiomediastinal silhouette is within normal limits. Shallow lung  expansion. The lungs appear clear. No visible pneumothorax, pulmonary  infiltrate or pleural effusion.       Impression:      Negative chest.     This report was finalized on 10/8/2020 3:09 PM by Dr. Fermin Choi MD.       XR Hip With or Without Pelvis 2 - 3 View Right [[id number]] Collected: 10/08/20 1503     Updated: 10/08/20 1509    Narrative:      10/08/2020:  1. RIGHT HIP.  2. RIGHT FEMUR.         HISTORY:  50-year-old male in the ED with right hip pain after a fall today.     TECHNIQUE:  Two view right femur series. Two-view right hip series.     FINDINGS:  There is a comminuted, mildly displaced intertrochanteric fracture of  the right femur involving both the greater and lesser trochanters.  Slight varus angulation. Femoral head and neck appear intact. There is  no visible fracture of the acetabulum or right hemipelvis.     Mid and distal portions of the right femur show no acute osseous  abnormality. Moderate degenerative arthropathy at the right knee.       Impression:      Comminuted intertrochanteric right femur fracture.     This report was finalized on 10/8/2020 3:07 PM by Dr. Fermin Choi MD.       XR Femur 2 View Right [[id number]] Collected: 10/08/20 1503     Updated: 10/08/20 1509    Narrative:      10/08/2020:  1. RIGHT HIP.  2. RIGHT FEMUR.         HISTORY:  50-year-old male in the ED with right hip pain after a fall today.     TECHNIQUE:  Two view right femur series. Two-view right hip series.     FINDINGS:  There is a comminuted, mildly displaced intertrochanteric fracture of  the right femur involving both the greater and lesser trochanters.  Slight varus angulation. Femoral head and neck appear intact. There is  no visible fracture of the acetabulum or right hemipelvis.     Mid and distal portions of the right femur show no acute osseous  abnormality. Moderate  degenerative arthropathy at the right knee.       Impression:      Comminuted intertrochanteric right femur fracture.     This report was finalized on 10/8/2020 3:07 PM by Dr. Fermin Choi MD.             PROCEDURES  Procedure(s):  HIP INTERTROCHANTERIC NAILING    Condition on Discharge:  Stable    Physical Exam at Discharge  Vital Signs  Temp:  [98 °F (36.7 °C)-98.5 °F (36.9 °C)] 98 °F (36.7 °C)  Heart Rate:  [] 118  Resp:  [18-20] 20  BP: (118-139)/(64-69) 139/69    Physical Exam:  Physical Exam   Constitutional: Patient appears well-developed and well-nourished and in no acute distress.  Appears older than stated age.   Cardiovascular: Regular rate, regular rhythm, S1 normal and S2 normal.  Exam reveals no gallop and no friction rub.  No murmur heard.  Pulmonary/Chest: Lungs are diminished to auscultation bilaterally. No respiratory distress. No wheezes. No rhonchi. No rales.   Abdominal: Soft. Bowel sounds are normal. There is no tenderness.   Musculoskeletal: Normal Muscle tone  Extremities: Right lower extremity edema.  Neurological: Cranial nerves II-XII are grossly intact with no focal deficits.    Discharge Disposition  [place]    Visiting Nurse:    No     Home PT/OT:  No     Home Safety Evaluation:  No     DME  None    Discharge Diet:      Dietary Orders (From admission, onward)     Start     Ordered    10/10/20 1013  Diet Regular  Diet Effective Now     Question:  Diet Texture / Consistency  Answer:  Regular    10/10/20 1013                Activity at Discharge:  As tolerated      Follow-up Appointments  No future appointments.  Additional Instructions for the Follow-ups that You Need to Schedule     Ambulatory Referral to Home Health   As directed      Face to Face Visit Date: 10/14/2020    Follow-up provider for Plan of Care?: I will be treating the patient on an ongoing basis.  Please send me the Plan of Care for signature.    Follow-up provider: LARRY GARCIA [[id number]]    Reason/Clinical  Findings: status post right hip nail    Describe mobility limitations that make leaving home difficult: increased risk fall/injury ambulating outside home    Nursing/Therapeutic Services Requested: Occupational Therapy Physical Therapy    Frequency: 1 Week 1               Test Results Pending at Discharge  None     Harris Irving MD  10/19/20  13:49 EDT    Time: <30 minutes

## 2020-10-19 NOTE — THERAPY TREATMENT NOTE
Acute Care - Occupational Therapy Treatment Note   Noris Ballesteros     Patient Name: Simone Boykin  : 1970  MRN: [id number]  Today's Date: 10/19/2020  Onset of Illness/Injury or Date of Surgery: 10/08/20  Date of Referral to OT: 10/09/20  Referring Physician: Dr. Solo    Admit Date: 10/8/2020       ICD-10-CM ICD-9-CM   1. Closed comminuted intertrochanteric fracture of right femur, initial encounter (CMS/[facility])  S72.141A 820.21   2. History of alcohol abuse  F10.11 305.03   3. Clostridioides difficile infection  A49.8 041.84   4. Status post fracture of right hip  Z87.81 V15.51     Patient Active Problem List   Diagnosis   • Neck mass   • Anxiety   • Pain of left lower extremity   • Swelling of lower leg   • Hematoma   • Closed comminuted intertrochanteric fracture of proximal end of right femur (CMS/[facility])     Past Medical History:   Diagnosis Date   • Anxiety    • Arthritis     JUAN KNEES   • Depression    • Fall    • GERD (gastroesophageal reflux disease)    • Heartburn     ON OCC   • Mass     BASE OF POSTERIOR NECK   • Shoulder dislocation     LONG TERM ISSUES WITH     Past Surgical History:   Procedure Laterality Date   • EXCISION MASS HEAD/NECK N/A 2016    Procedure: MASS SOFT TISSUE EXCISION POSTERIOR NECK;  Surgeon: Crow Ayala Jr., MD;  Location: [facility];  Service:    • KNEE ARTHROSCOPY Right           OT ASSESSMENT FLOWSHEET (last 12 hours)      OT Evaluation and Treatment     Row Name 10/19/20 0933                   OT Time and Intention    Document Type  therapy note (daily note)  -EN        Mode of Treatment  occupational therapy  -EN        Patient Effort  adequate  -EN        Symptoms Noted During/After Treatment  fatigue  -EN           Pain Scale: Numbers Pre/Post-Treatment    Pretreatment Pain Rating  -- did not rate  -EN        Pain Intervention(s)  Cold applied;Repositioned  -EN           Bed Mobility    Bed Mobility  supine-sit  -EN        Supine-Sit Homer (Bed  Mobility)  minimum assist (75% patient effort);verbal cues  -EN        Bed Mobility, Safety Issues  decreased use of arms for pushing/pulling;decreased use of legs for bridging/pushing  -EN        Assistive Device (Bed Mobility)  bed rails;head of bed elevated  -EN           Functional Mobility    Functional Mobility- Ind. Level  verbal cues required;contact guard assist;minimum assist (75% patient effort)  -EN        Functional Mobility- Device  rolling walker  -EN        Functional Mobility-Distance (Feet)  -- 7  -EN        Functional Mobility- Comment  min assist for manuevering walker  -EN           Transfer Assessment/Treatment    Comment (Transfers)  verbal cues for hand placement  -EN           Transfers    Sit-Stand Brice (Transfers)  verbal cues;contact guard  -EN        Stand-Sit Brice (Transfers)  contact guard;verbal cues  -EN           Sit-Stand Transfer    Assistive Device (Sit-Stand Transfers)  walker, front-wheeled  -EN           Stand-Sit Transfer    Assistive Device (Stand-Sit Transfers)  walker, front-wheeled  -EN           Lower Body Dressing Assessment/Training    Brice Level (Lower Body Dressing)  don;pants/bottoms;shoes/slippers  -EN        Comment (Lower Body Dressing)  Patient utilized reacher and donned pants over LEs with verbal cues. Patient stood and required min assist to pull pants over hips. Patient required mod assist to don left shoe. Recommended slip on shoe and shoe horn for improved ind.  -EN           Wound 10/09/20 1700 Right lateral hip Incision    Wound - Properties Group Placement Date: 10/09/20  -ME Placement Time: 1700  -ME Side: Right  -ME Orientation: lateral  -ME Location: hip  -ME, hip and leg incisions  Primary Wound Type: Incision  -ME Additional Comments: prineo, telfa, tegaderms  -ME    Retired Wound - Properties Group Date first assessed: 10/09/20  -ME Time first assessed: 1700  -ME Side: Right  -ME Location: hip  -ME, hip and leg incisions   Primary Wound Type: Incision  -ME Additional Comments: prineo, telfa, tegaderms  -ME       Plan of Care Review    Plan of Care Reviewed With  patient  -EN        Outcome Summary  OT- Patient performed supine to sit with min assist. Patient doffed and donned pants with reacher and min assist and  donned left shoe with mod assist. Patient stood with CGA from EOB and walked 7 ft. to chair with CGA/min asisst.   -EN           Transfer Goal 1 (OT)    Progress/Outcome (Transfer Goal 1, OT)  goal met  -EN           Dressing Goal 1 (OT)    Progress/Outcome (Dressing Goal 1, OT)  goal revised this date pt. performing with min A, revise to CGA  -EN           Positioning and Restraints    Pre-Treatment Position  in bed  -EN        Post Treatment Position  chair  -EN        In Chair  reclined;call light within reach;encouraged to call for assist  -EN          User Key  (r) = Recorded By, (t) = Taken By, (c) = Cosigned By    Initials Name Effective Dates    EN Rosalba Voss OTR 07/05/20 -     ME Izabella Lyons RN 04/10/20 -          Occupational Therapy Education                 Title: PT OT SLP Therapies (In Progress)     Topic: Occupational Therapy (Done)     Point: ADL training (Done)     Description:   Instruct learner(s) on proper safety adaptation and remediation techniques during self care or transfers.   Instruct in proper use of assistive devices.              Learning Progress Summary           Patient Acceptance, E, VU by EN at 10/19/2020 1106    Comment: Ed. on use of LH AE and safety during functional transfers/mobility.    Acceptance, E, NR by EN at 10/14/2020 1139    Comment: benefits of activity, safety during transfers, ADL retraiing    Acceptance, E, VU by EN at 10/12/2020 1419    Comment: Safety during transfers, ADL retraining, PWB status    Acceptance, E, VU by EN at 10/10/2020 1055    Comment: Safety, transfers, PWB status                   Point: Precautions (Done)     Description:   Instruct  learner(s) on prescribed precautions during self-care and functional transfers.              Learning Progress Summary           Patient Acceptance, E, VU by EN at 10/19/2020 1106    Comment: Ed. on use of LH AE and safety during functional transfers/mobility.    Acceptance, E,TB, VU by RAYA at 10/16/2020 1312    Comment: pt educated on safety with functional transfers and PWB of R LE    Acceptance, E,TB, VU by RAYA at 10/15/2020 1342    Comment: pt educated on long handled ae, adls and safety with functional transfers and balance    Acceptance, E, NR by EN at 10/14/2020 1139    Comment: benefits of activity, safety during transfers, ADL retraiing    Acceptance, E, VU by EN at 10/12/2020 1419    Comment: Safety during transfers, ADL retraining, PWB status    Acceptance, E, VU by EN at 10/10/2020 1055    Comment: Safety, transfers, PWB status                               User Key     Initials Effective Dates Name Provider Type Discipline    SUMAYA 07/05/20 -  Rosalba Voss OTR Occupational Therapist OT    RAYA 07/05/20 -  Ayah Madrid OTR Occupational Therapist OT                  OT Recommendation and Plan  Planned Therapy Interventions (OT): BADL retraining, adaptive equipment training, transfer/mobility retraining  Therapy Frequency (OT): 5 times/wk  Progress Toward Functional Goals (OT): progress toward functional goals is gradual  Plan of Care Review  Plan of Care Reviewed With: patient  Progress: no change  Outcome Summary: OT- Patient performed supine to sit with min assist. Patient doffed and donned pants with reacher and min assist and  donned left shoe with mod assist. Patient stood with CGA from EOB and walked 7 ft. to chair with CGA/min asisst.   Plan of Care Reviewed With: patient  Outcome Summary: OT- Patient performed supine to sit with min assist. Patient doffed and donned pants with reacher and min assist and  donned left shoe with mod assist. Patient stood with CGA from EOB and walked 7  ft. to chair with CGA/min asisst.     Outcome Measures     Row Name 10/19/20 0933 10/18/20 0935 10/17/20 0915       How much help from another person do you currently need...    Turning from your back to your side while in flat bed without using bedrails?  --  3  -JW  3  -BP    Moving from lying on back to sitting on the side of a flat bed without bedrails?  --  3  -JW  2  -BP    Moving to and from a bed to a chair (including a wheelchair)?  --  3  -JW  2  -BP    Standing up from a chair using your arms (e.g., wheelchair, bedside chair)?  --  2  -JW  2  -BP    Climbing 3-5 steps with a railing?  --  1  -JW  1  -BP    To walk in hospital room?  --  3  -JW  2  -BP    AM-PAC 6 Clicks Score (PT)  --  15  -  12  -BP       How much help from another is currently needed...    Putting on and taking off regular lower body clothing?  3  -EN  --  --    Bathing (including washing, rinsing, and drying)  3  -EN  --  --    Toileting (which includes using toilet bed pan or urinal)  3  -EN  --  --    Putting on and taking off regular upper body clothing  4  -EN  --  --    Taking care of personal grooming (such as brushing teeth)  4  -EN  --  --    Eating meals  4  -EN  --  --    AM-PAC 6 Clicks Score (OT)  21  -EN  --  --       Functional Assessment    Outcome Measure Options  --  -[facility] 6 Clicks Basic Mobility (PT)  -  AM-[facility] 6 Clicks Basic Mobility (PT)  -      User Key  (r) = Recorded By, (t) = Taken By, (c) = Cosigned By    Initials Name Provider Type    EN Rosalba Voss OTR Occupational Therapist    BP Jaleel Laird, PT Physical Therapist    Sapphire Velázquez, ADY Physical Therapist          Time Calculation:   Time Calculation- OT     Row Name 10/19/20 1107             Time Calculation- OT    OT Start Time  0932  -EN      OT Stop Time  0957  -EN      OT Time Calculation (min)  25 min  -EN         Timed Charges    19961 - OT Self Care/Mgmt Minutes  25  -EN        User Key  (r) = Recorded By, (t) = Taken By,  (c) = Cosigned By    Initials Name Provider Type    EN Rosalba Voss OTR Occupational Therapist        Therapy Charges for Today     Code Description Service Date Service Provider Modifiers Qty    [id number] HC OT SELF CARE/MGMT/TRAIN EA 15 MIN 10/19/2020 Rosalba Voss OTR GO 2               COOPER Lima  10/19/2020

## 2020-10-19 NOTE — NURSING NOTE
Continued Stay Note  SUSANNA Smith     Patient Name: Simone Boykin  MRN: [id number]  Today's Date: 10/19/2020    Admit Date: 10/8/2020    Discharge Plan     Row Name 10/19/20 1144       Plan    Plan  [facility]    Plan Comments  Follow up call to Ayana who states facility van can pick patient up at 2pm today. Nazanin BARRAZA updated to meet [facility] van at main entrance at 2pm today. Message to update Dr Irving and patient updated. CM will continue to follow.    Row Name 10/19/20 1055       Plan    Plan  plan [facility]    Plan Comments  Spoke with [facility] who states they are able to accept patient today. She will check to see if their van is available for transport. Spoke with patient from doorway (he is in isolation) informed accepted at [facility], waiting to confirm transportation.He is sitting up in recliner. CM will continue to follow.        Discharge Codes    No documentation.             Edwar Short RN

## 2020-10-19 NOTE — NURSING NOTE
Continued Stay Note  SUSANNA Smith     Patient Name: Simone Boykin  MRN: [id number]  Today's Date: 10/19/2020    Admit Date: 10/8/2020    Discharge Plan     Row Name 10/19/20 1055       Plan    Plan  plan [facility]    Plan Comments  Spoke with [facility]ana who states they are able to accept patient today. She will check to see if their van is available for transport. Spoke with patient from doorway (he is in isolation) informed accepted at [facility], waiting to confirm transportation.He is sitting up in recliner. CM will continue to follow.        Discharge Codes    No documentation.             Edwar Short RN

## 2020-10-19 NOTE — PLAN OF CARE
Problem: Adult Inpatient Plan of Care  Goal: Plan of Care Review  Recent Flowsheet Documentation  Taken 10/19/2020 0932 by Nanette Rose, PT  Plan of Care Reviewed With: patient  Outcome Summary: PT: Patient needed minimal assist to transfer supine to sit, primarily for right leg with use of bed rail and HOB elevated. He transfers sit to stand with CGA. He was able to ambulate 7 feet from bed to chair with FWW, PWB right leg and minimal assist.  Patient continues with tremors B UE & LE which affects his mobility and gait and limits distance he can ambulate. He needs verbal cueing and occas. physical assist to manage walker  and cueing to increase his stride length on right. He does maintain PWB right leg well.  Instructed patient in 10 reps AP, QS, and GS. Will continue to follow patient daily while in hospital. Plan is for discharge to the [facility] today for STR.

## 2020-10-19 NOTE — THERAPY TREATMENT NOTE
Acute Care - Physical Therapy Treatment Note  SUSANNA HammJamieson     Patient Name: Simone Boykin  : 1970  MRN: [id number]  Today's Date: 10/19/2020   Onset of Illness/Injury or Date of Surgery: 10/08/20       PT Assessment (last 12 hours)      PT Evaluation and Treatment     Row Name 10/19/20 0932          Physical Therapy Time and Intention    Subjective Information  complains of;pain;fatigue  -LN     Document Type  therapy note (daily note)  -LN     Mode of Treatment  physical therapy  -LN     Patient Effort  adequate  -LN     Symptoms Noted During/After Treatment  fatigue  -LN     Comment  Patient c/o pain in right patella tendon.  He continues to need extended time to complete mobility/activities.   -LN     Row Name 10/19/20 0932          Cognition    Personal Safety Interventions  gait belt;nonskid shoes/slippers when out of bed;supervised activity  -LN     Row Name 10/19/20 0932          Pain Scale: Numbers Pre/Post-Treatment    Pre/Posttreatment Pain Comment  Patient c/o pain in right knee but was not rated.  -LN     Row Name 10/19/20 0932          Pain Scale: Word Pre/Post-Treatment    Pre/Posttreatment Pain Comment  Patient applied 3 salonpas pads to right knee while seated at EOB.   -LN     Pain Intervention(s)  Repositioned;Ambulation/increased activity  -LN     Row Name 10/19/20 0932          Mobility    Extremity Weight-bearing Status  right lower extremity  -LN     Right Lower Extremity (Weight-bearing Status)  partial weight-bearing (PWB)  -LN     Row Name 10/19/20 0932          Bed Mobility    Bed Mobility  supine-sit  -LN     Supine-Sit [place] (Bed Mobility)  minimum assist (75% patient effort);other (see comments) primarily with right leg  -LN     Bed Mobility, Safety Issues  decreased use of legs for bridging/pushing  -LN     Assistive Device (Bed Mobility)  bed rails;head of bed elevated  -LN     Row Name 10/19/20 0932          Transfers    Transfers  sit-stand transfer;stand-sit  transfer  -LN     Maintains Weight-bearing Status (Transfers)  able to maintain maintains PWB on right leg well.  -LN     Comment (Transfers)  verbal cueing for proper hand placement.  -LN     Sit-Stand [place] (Transfers)  contact guard;verbal cues  -LN     Stand-Sit [place] (Transfers)  contact guard;verbal cues  -LN     Row Name 10/19/20 0932          Sit-Stand Transfer    Assistive Device (Sit-Stand Transfers)  walker, front-wheeled  -LN     Row Name 10/19/20 0932          Stand-Sit Transfer    Assistive Device (Stand-Sit Transfers)  walker, front-wheeled  -LN     Row Name 10/19/20 0932          Gait/Stairs (Locomotion)    Gait/Stairs Locomotion  gait/ambulation independence;gait/ambulation assistive device;distance ambulated  -LN     [place] Level (Gait)  minimum assist (75% patient effort);contact guard;1 person assist;1 person to manage equipment;verbal cues;nonverbal cues (demo/gesture)  -LN     Assistive Device (Gait)  walker, front-wheeled  -LN     Distance in Feet (Gait)  7 feet   -LN     Deviations/Abnormal Patterns (Gait)  base of support, wide;gait speed decreased;stride length decreased;weight shifting decreased  -LN     Bilateral Gait Deviations  forward flexed posture  -LN     Right Sided Gait Deviations  forward flexed posture;heel strike decreased tremors B UE and LE  -LN     Comment (Gait/Stairs)  Patient continues with tremors B UE & LE which affects his mobility and gait and limits distance he can ambulate. He needs verbal cueing and occas. physical assist to manage walker  and cueing to increase his stride length on right. He does maintain PWB right leg well.   -LN     Row Name 10/19/20 0932          Safety Issues, Functional Mobility    Safety Issues Affecting Function (Mobility)  positioning of assistive device  -LN     Impairments Affecting Function (Mobility)  balance;endurance/activity tolerance;strength;other (see comments) tremors  -LN     Comment, Safety  Issues/Impairments (Mobility)  needs occas. cueing/assist for safe positioning of walker.  -LN     Row Name 10/19/20 0932          Motor Skills    Therapeutic Exercise  -- instructed pt in 10 reps AP/QS/GS.  -LN                            Row Name 10/19/20 0932          Plan of Care Review    Plan of Care Reviewed With  patient  -LN     Outcome Summary  PT: Patient needed minimal assist to transfer supine to sit, primarily for right leg with use of bed rail and HOB elevated. He transfers sit to stand with CGA. He was able to ambulate 7 feet from bed to chair with FWW, PWB right leg and minimal assist.  Patient continues with tremors B UE & LE which affects his mobility and gait and limits distance he can ambulate. He needs verbal cueing and occas. physical assist to manage walker  and cueing to increase his stride length on right. He does maintain PWB right leg well.  Instructed patient in 10 reps AP, QS, and GS. Will continue to follow patient daily while in hospital. Plan is for discharge to the [facility] today for STR.   -LN     Row Name 10/19/20 0932          Positioning and Restraints    Pre-Treatment Position  in bed  -LN     Post Treatment Position  chair  -LN     In Chair  reclined;call light within reach;encouraged to call for assist;legs elevated ice on right hip  -LN     Row Name 10/19/20 0932          Progress Summary (PT)    Progress Toward Functional Goals (PT)  progress toward functional goals is fair  -LN     Barriers to Overall Progress (PT)  B UE and LE tremors  -LN       User Key  (r) = Recorded By, (t) = Taken By, (c) = Cosigned By    Initials Name Provider Type    Nanette Hughes, PT Physical Therapist              Physical Therapy Education                 Title: PT OT SLP Therapies (Done)     Topic: Physical Therapy (Done)     Point: Mobility training (Done)     Learning Progress Summary           Patient Acceptance, E,TB,D, VU,NR by LN at 10/19/2020 9666    Comment: Conitnued  education on functional mobility and gait with FWW, PWB right. Instructed patient in 10 reps AP/QS/GS.  He continues to need cueing for safe positioning of walker and to increase his stride length.    Acceptance, E,TB, VU,NR by JW at 10/18/2020 1010    Acceptance, E,TB, NR by BP at 10/17/2020 1018    Acceptance, E,TB, NR by BP at 10/16/2020 1016    Acceptance, E,TB, NR by JW at 10/15/2020 1315    Acceptance, E,TB, NR by BP at 10/14/2020 1104    Acceptance, E, VU by AB at 10/13/2020 0529    Acceptance, E,TB, VU by LH at 10/12/2020 1540    Acceptance, E,TB, VU by LH at 10/12/2020 1201    Acceptance, E, VU by LN at 10/10/2020 1256    Comment: Education on functional mobility and gait with FWW, PWB right leg.                   Point: Home exercise program (Done)     Learning Progress Summary           Patient Acceptance, E,TB,D, VU,NR by LN at 10/19/2020 1237    Comment: Conitnued education on functional mobility and gait with FWW, PWB right. Instructed patient in 10 reps AP/QS/GS.  He continues to need cueing for safe positioning of walker and to increase his stride length.                   Point: Body mechanics (Done)     Learning Progress Summary           Patient Acceptance, E,TB,D, VU,NR by LN at 10/19/2020 1237    Comment: Conitnued education on functional mobility and gait with FWW, PWB right. Instructed patient in 10 reps AP/QS/GS.  He continues to need cueing for safe positioning of walker and to increase his stride length.    Acceptance, E, VU by AB at 10/13/2020 0529    Acceptance, E, VU by LN at 10/10/2020 1256    Comment: Education on functional mobility and gait with FWW, PWB right leg.                   Point: Precautions (Done)     Learning Progress Summary           Patient Acceptance, E,TB,D, VU,NR by LN at 10/19/2020 1237    Comment: Conitnued education on functional mobility and gait with FWW, PWB right. Instructed patient in 10 reps AP/QS/GS.  He continues to need cueing for safe positioning of  walker and to increase his stride length.    Acceptance, E,TB, VU,NR by JW at 10/18/2020 1010    Acceptance, E,TB, NR by BP at 10/17/2020 1018    Acceptance, E,TB, NR by BP at 10/16/2020 1016    Acceptance, E,TB, NR by JW at 10/15/2020 1315    Acceptance, E,TB, NR by BP at 10/14/2020 1104    Acceptance, E, VU by AB at 10/13/2020 0529    Acceptance, E,TB, VU by  at 10/12/2020 1540    Acceptance, E,TB, VU by  at 10/12/2020 1201    Acceptance, E, VU by LN at 10/10/2020 1256    Comment: Education on functional mobility and gait with FWW, PWB right leg.                               User Key     Initials Effective Dates Name Provider Type Discipline     06/08/18 -  Graciela Oliveira, PT Physical Therapist PT    AB 06/16/16 -  Marina Hendrix RN Registered Nurse Nurse    LN 08/09/20 -  Nanette Rose, PT Physical Therapist PT     04/03/18 -  Jaleel Laird, PT Physical Therapist PT     04/03/18 -  Sapphire Anthony, PT Physical Therapist PT              PT Recommendation and Plan  Planned Therapy Interventions (PT): bed mobility training, gait training, home exercise program, patient/family education, ROM (range of motion), stair training, strengthening  Therapy Frequency (PT): other (see comments)(2 x day; daily on Sat and Sunday)  Progress Summary (PT)  Progress Toward Functional Goals (PT): progress toward functional goals is fair  Barriers to Overall Progress (PT): B UE and LE tremors  Plan of Care Reviewed With: patient  Outcome Summary: PT: Patient needed minimal assist to transfer supine to sit, primarily for right leg with use of bed rail and HOB elevated. He transfers sit to stand with CGA. He was able to ambulate 7 feet from bed to chair with FWW, PWB right leg and minimal assist.  Patient continues with tremors B UE & LE which affects his mobility and gait and limits distance he can ambulate. He needs verbal cueing and occas. physical assist to manage walker  and cueing to increase his  stride length on right. He does maintain PWB right leg well.  Instructed patient in 10 reps AP, QS, and GS. Will continue to follow patient daily while in hospital. Plan is for discharge to the [facility] today for STR.        Outcome Measures     Row Name 10/19/20 0933 10/19/20 0932 10/18/20 0935       How much help from another person do you currently need...    Turning from your back to your side while in flat bed without using bedrails?  --  3  -LN  3  -JW    Moving from lying on back to sitting on the side of a flat bed without bedrails?  --  3  -LN  3  -JW    Moving to and from a bed to a chair (including a wheelchair)?  --  3  -LN  3  -JW    Standing up from a chair using your arms (e.g., wheelchair, bedside chair)?  --  3  -LN  2  -JW    Climbing 3-5 steps with a railing?  --  1  -LN  1  -JW    To walk in hospital room?  --  3  -LN  3  -JW    AM-PAC 6 Clicks Score (PT)  --  16  -LN  15  -JW       How much help from another is currently needed...    Putting on and taking off regular lower body clothing?  3  -EN  --  --    Bathing (including washing, rinsing, and drying)  3  -EN  --  --    Toileting (which includes using toilet bed pan or urinal)  3  -EN  --  --    Putting on and taking off regular upper body clothing  4  -EN  --  --    Taking care of personal grooming (such as brushing teeth)  4  -EN  --  --    Eating meals  4  -EN  --  --    AM-PAC 6 Clicks Score (OT)  21  -EN  --  --       Functional Assessment    Outcome Measure Options  --  AM-PAC 6 Clicks Basic Mobility (PT)  -LN  AM-PAC 6 Clicks Basic Mobility (PT)  -JW    Row Name 10/17/20 0915             How much help from another person do you currently need...    Turning from your back to your side while in flat bed without using bedrails?  3  -BP      Moving from lying on back to sitting on the side of a flat bed without bedrails?  2  -BP      Moving to and from a bed to a chair (including a wheelchair)?  2  -BP      Standing up from a chair  using your arms (e.g., wheelchair, bedside chair)?  2  -BP      Climbing 3-5 steps with a railing?  1  -BP      To walk in hospital room?  2  -BP      AM-PAC 6 Clicks Score (PT)  12  -BP         Functional Assessment    Outcome Measure Options  AM-PAC 6 Clicks Basic Mobility (PT)  -BP        User Key  (r) = Recorded By, (t) = Taken By, (c) = Cosigned By    Initials Name Provider Type    EN Rosalba Voss, OTR Occupational Therapist    Nanette Hughes, PT Physical Therapist    BP Jaleel Laird, PT Physical Therapist    Sapphire Velázquez, PT Physical Therapist           Time Calculation:   PT Charges     Row Name 10/19/20 1240             Time Calculation    Start Time  0932  -LN      Stop Time  0957  -LN      Time Calculation (min)  25 min  -LN        User Key  (r) = Recorded By, (t) = Taken By, (c) = Cosigned By    Initials Name Provider Type    Nanette Hughes, PT Physical Therapist        Therapy Charges for Today     Code Description Service Date Service Provider Modifiers Qty    99414680506 HC PT THER PROC EA 15 MIN 10/19/2020 Nanette Rose, PT GP 1    98569818107 HC GAIT TRAINING EA 15 MIN 10/19/2020 Nanette Rose, PT GP 1          PT G-Codes  Outcome Measure Options: AM-PAC 6 Clicks Basic Mobility (PT)  AM-PAC 6 Clicks Score (PT): 16  AM-PAC 6 Clicks Score (OT): 21    Nanette Rose PT  10/19/2020

## 2020-10-19 NOTE — PLAN OF CARE
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

## 2020-10-19 NOTE — PLAN OF CARE
Problem: Adult Inpatient Plan of Care  Goal: Plan of Care Review  Recent Flowsheet Documentation  Taken 10/19/2020 0933 by Rosalba Voss OTR  Plan of Care Reviewed With: patient  Outcome Summary: OT- Patient performed supine to sit with min assist. Patient doffed and donned pants with reacher and min assist and  donned left shoe with mod assist. Patient stood with CGA from EOB and walked 7 ft. to chair with CGA/min asisst.

## 2020-10-20 ENCOUNTER — TELEPHONE (OUTPATIENT)
Dept: ORTHOPEDIC SURGERY | Facility: CLINIC | Age: 50
End: 2020-10-20

## 2020-10-20 NOTE — NURSING NOTE
Case Management Discharge Note      Final Note: D/C to [facility] for STR    Provided Post Acute Provider List?: Refused  Refused Provider List Comment: I don't need anything    Selected Continued Care - Discharged on 10/19/2020 Admission date: 10/8/2020 - Discharge disposition: Rehab Facility or Unit (DC - External)    Destination Coordination complete    Service Provider Selected Services Address Phone Fax    [facility]  Skilled Nursing [address] [place] [address] [phone]          Durable Medical Equipment Coordination complete    Service Provider Selected Services Address Phone Fax    [facility]  Durable Medical Equipment [address] [id number] [phone]          Dialysis/Infusion    No services have been selected for the patient.              Home Medical Care Coordination complete    Service Provider Selected Services Address Phone Fax    [facility]  Home Health Services [address] [address] [phone]          Therapy    No services have been selected for the patient.              Community Resources    No services have been selected for the patient.                       Final Discharge Disposition Code: 03 - skilled nursing facility (SNF)

## 2020-10-20 NOTE — TELEPHONE ENCOUNTER
When does the pt need to be seen back in the office and what is his WB status?   This info did not come over with him to the nursing home.

## 2020-10-22 ENCOUNTER — TELEPHONE (OUTPATIENT)
Dept: ORTHOPEDIC SURGERY | Facility: CLINIC | Age: 50
End: 2020-10-22

## 2020-10-22 NOTE — TELEPHONE ENCOUNTER
Pt at the nursing home states they need to know a percentage or an exact amount of wt. Pt is to bear on his leg.  SEC, said toe touch but they will not accept that, they need a number.

## 2020-10-27 ENCOUNTER — OFFICE VISIT (OUTPATIENT)
Dept: ORTHOPEDIC SURGERY | Facility: CLINIC | Age: 50
End: 2020-10-27

## 2020-10-27 VITALS
BODY MASS INDEX: 33.86 KG/M2 | DIASTOLIC BLOOD PRESSURE: 80 MMHG | WEIGHT: 250 LBS | HEIGHT: 72 IN | HEART RATE: 108 BPM | SYSTOLIC BLOOD PRESSURE: 140 MMHG

## 2020-10-27 DIAGNOSIS — Z09 STATUS POST ORTHOPEDIC SURGERY, FOLLOW-UP EXAM: Primary | ICD-10-CM

## 2020-10-27 PROCEDURE — 99024 POSTOP FOLLOW-UP VISIT: CPT | Performed by: NURSE PRACTITIONER

## 2020-10-27 RX ORDER — LANOLIN ALCOHOL/MO/W.PET/CERES
100 CREAM (GRAM) TOPICAL DAILY
COMMUNITY
Start: 2020-10-07

## 2020-10-27 RX ORDER — DOCUSATE SODIUM -SENNOSIDES 50; 8.6 MG/1; MG/1
TABLET, COATED ORAL
COMMUNITY
Start: 2020-10-23

## 2020-10-27 RX ORDER — MAGNESIUM OXIDE 400 MG/1
TABLET ORAL
COMMUNITY
Start: 2020-10-19 | End: 2020-12-03

## 2020-10-27 RX ORDER — LACTULOSE 10 G/15ML
SOLUTION ORAL
COMMUNITY
Start: 2020-10-19 | End: 2020-12-03

## 2020-10-27 RX ORDER — NYSTATIN 100000 U/G
OINTMENT TOPICAL
COMMUNITY
Start: 2020-10-21 | End: 2020-12-03

## 2020-10-27 NOTE — PROGRESS NOTES
CC: F/u s/p right hip intramedullary nail for treatment of intertrochanteric fracture, DOS 10/09/2020     SUBJECTIVE:Patient returns today for 2 week post-op follow up of right hip. Patient reports he is doing fairly well short-term rehab has continued ambulating with walker.  He does feel as if he is getting stronger.  He did have some discomfort after the x-ray that was completed 1 day ago which he does have available for viewing.  He denies any concerns that he has at this time denies any presence of fevers, sweats, chills.  Denies calf tenderness bilaterally. Has continued with Eliquis for DVT prophylaxis.      EXAM:  Right hip- incision clean, dry, and intact              No erythema, or subcutaneous fluid collection swelling              No pain on passive logroll              Flex/extend all digits right foot with 4/5 strength, moderate swelling which patient states at baseline              1+ dorsalis pedis pulse, flex extend all digits right foot, positive sensation all distributions right foot compared to contralateral side, brisk cap  refill all digits              No calf pain, negative Homans sign bilateral lower extremities  Imaging: 3 view x-ray imaging completed outside facility right femur hip indication status post IM hip nail for treatment of intertrochanteric fracture hardware with adequate placement, anatomical alignment noted, no new fractures no evidence of any infection, imaging compared to immediate postop films     ASSESSMENT: status post right hip intramedullary nail for treatment of intertrochanteric fracture      PLAN:   1.  Pernio Dermabond dressing removed at today's visit encouraged to avoid submerging down into water for the next 2 weeks, continue with partial weightbearing right lower extremity with progression to full weightbearing for the next 2 weeks.  2.  We will continue with short-term rehab physical therapy for gait training, range of motion, strengthening.  3.  We will  continue for DVT prophylaxis Eliquis x4 weeks  4.  Follow-up in 4 weeks with x-rays to be repeated of the right hip and femur.  Patient verbalized understanding of all information agrees plan of care.  Denies other concerns present at this time.

## 2020-10-28 ENCOUNTER — TELEPHONE (OUTPATIENT)
Dept: ORTHOPEDIC SURGERY | Facility: CLINIC | Age: 50
End: 2020-10-28

## 2020-10-28 NOTE — TELEPHONE ENCOUNTER
Nurse, Connie called and requested the return to office appointment date and time and wanted to know if there is a change in his WBS.   Per SEC continue with partial weightbearing right lower extremity with progression to full weightbearing for the next 2 weeks.  This was relayed to the Connie at the [facility].

## 2020-11-13 RX ORDER — APIXABAN 2.5 MG/1
TABLET, FILM COATED ORAL
Qty: 60 TABLET | Refills: 0 | OUTPATIENT
Start: 2020-11-13

## 2020-11-18 NOTE — PROGRESS NOTES
"  Physical Therapy Initial Evaluation and Plan of Care      Patient: Simone Boykin   : 1970  Diagnosis/ICD-10 Code:  Right hip pain [M25.551]  Referring practitioner: Adalberto Solo MD  Date of Initial Visit: 2020  Today's Date: 2020  Patient seen for 1 sessions           Subjective Questionnaire: LEFS 41/80      Subjective Evaluation    History of Present Illness  Date of onset: 10/8/2020  Mechanism of injury: Pt presents to physical therapy following closed comminuted intertrochanteric fracture of right femur. The patient is a 50-year-old male that presented to the emergency department secondary to ground-level fall onto right hip with immediate pain and in ability to bear weight.  He notes that he was \"walking with his walker, turned and wheel caught, tripping me\".  He reports that he was discharged from [facility] for seizures and concussion on 10/6/2020 after another fall due to tripping over his dog. He states that he quit his job to move here to help with his dad who had quadruple bypass. Pt discharged from acute care to [facility] and then transitioned to outpatient PT. Pt has a history of alcohol abuse/alcohol withdrawal, delirium, GI bleed, PETER.    Pain  Current pain ratin  At best pain ratin  At worst pain ratin  Location: R leg  Quality: dull ache, discomfort, sharp and tight  Aggravating factors: stairs, sleeping, prolonged positioning, squatting, repetitive movement, standing and movement  Progression: improved    Social Support  Lives in: one-story house (with basement)  Lives with: parents (mom)    Hand dominance: left    Treatments  Treatments tried: none.  Current treatment comments: none.   Discharged from (in last 30 days): skilled nursing facility  Discharged from (in last 30 days) comments: ~ 1 week ago.     Patient Goals  Patient goals for therapy: decreased pain, improved balance, increased motion, increased strength, independence " with ADLs/IADLs and return to sport/leisure activities             Objective          Observations     Right Hip  Positive for edema, effusion and incision.     Additional Hip Observation Details  No s/s of infection in lateral R hip incision    Palpation     Right   No palpable tenderness to the adductor brevis, adductor longus, adductor za, lumbar paraspinals, obturator externus, quadratus lumborum, rectus femoris and sartorius.   Hypertonic in the TFL. Tenderness of the gluteus andrea, gluteus medius, iliopsoas, piriformis, proximal biceps femoris, proximal semimembranosus and proximal semitendinosus.     Tenderness     Right Hip   Tenderness in the greater trochanter. No tenderness in the ASIS, PSIS, iliac crest and sacroiliac joint.     Lumbar Screen  Lumbar range of motion within normal limits.    Neurological Testing     Sensation     Hip   Left Hip   Intact: light touch    Right Hip   Intact: light touch    Reflexes   Left   Patellar (L4): normal (2+)  Achilles (S1): trace (1+)    Right   Patellar (L4): normal (2+)  Achilles (S1): trace (1+)    Active Range of Motion     Right Hip   Flexion: 74 (with SLR in supine) degrees with pain  Extension: with pain  Abduction: 38 (sidelying) degrees with pain  Adduction: with pain  External rotation (prone): 42 degrees with pain  Internal rotation (prone): 13 degrees with pain    Additional Active Range of Motion Details  R knee flexion (seated): 105 degrees    R knee flexion: 108 degrees  R knee extension: lacking 12 degrees    Joint Play     Right Hip     Hypomobile in the posterior hip capsule and anterior hip capsule    Strength/Myotome Testing     Right Hip   Planes of Motion   Flexion: 3+  Extension: 4-  Abduction: 4-  Adduction: 4  External rotation: 3+  Internal rotation: 3+    Tests     Right Hip   Positive Ely's, BURTON and FADIR.   Negative scour.     Additional Tests Details  Negative Janay's sign RLE    Ambulation     Observational Gait   Gait:  antalgic   Left stance time, left swing time and left step length within functional limits. Decreased walking speed, stride length, right stance time, right swing time and right step length.   Left foot contact pattern: heel to toe  Right foot contact pattern: heel to toe  Base of support: normal    Functional Assessment   Squat   Pain.     Comments  5 time sit to/from stand; no UE support from standard chair: 15.4 sec     General Comments     Hip Comments   Swelling B ankles  R ankle: 29 cm  L ankle: 28 cm         Assessment & Plan     Assessment  Impairments: abnormal gait, abnormal muscle firing, abnormal muscle tone, abnormal or restricted ROM, activity intolerance, impaired balance, impaired physical strength, lacks appropriate home exercise program, pain with function, safety issue and weight-bearing intolerance  Assessment details: Simone Boykin is a 50 year-old male referred to physical therapy following R proximal femur fracture due to fall. He presents with a stable clinical presentation.  He has comorbidities of history of R knee surgery, anxiety/depression and personal factors of history of alcohol abuse and inability to drive that may affect his progress in the plan of care. Patient is appropriate for skilled physical therapy in order to reduce pain and increase ease with daily mobility. He presents with healing incision with mild edema but no s/s of infection.  He has lateral hip tenderness, muscle weakness in hip and knee, and decreased ROM in his hip/knee.  He is ambulating with a RWx safely however mildly antalgic gait pattern at this time. Based on the above findings he is a good candidate for therapy to decrease pain, regain ROM and flexibility, and improve strength allowing him to return to ADL's without AD and be able to garden in the spring. During evaluation, pt educated on anatomy, goal of interventions, sleeping position, initial HEP, importance of prone positioning to decrease hip  flexor tightness and body mechanics to promote healthy lifestyle and improve quality of life.     Prognosis: good  Functional Limitations: carrying objects, sleeping, walking, pushing, uncomfortable because of pain, moving in bed, sitting, standing, stooping and unable to perform repetitive tasks  Goals  Plan Goals: Plan Goals: STG x 4 weeks  1.  Patient will demonstrate increase hip IR >25 degrees.  2.  Pt will improve R knee AROM 5-120 degrees  3.  Patient will demonstrate increase in R hip strength to grossly 4/5  4.  Ambulate without AD with mild antalgic gait pattern  5.  Ascend stairs in a reciprocal pattern with railing    LTG x 8 weeks  1.  Bilateral hip AROM with tolerable symptoms.  2.  Squat with proper mechanics to pick something up off floor  3.  Patient to don socks/shoes without pain  4.  Decrease 5 time sit to/from stand time to 12 sec or less  5.  In/out of car with minimal discomfort.    Plan  Therapy options: will be seen for skilled physical therapy services  Planned modality interventions: cryotherapy, thermotherapy (hydrocollator packs), ultrasound, TENS, dry needling and electrical stimulation/Russian stimulation  Planned therapy interventions: abdominal trunk stabilization, ADL retraining, balance/weight-bearing training, body mechanics training, flexibility, functional ROM exercises, gait training, home exercise program, manual therapy, neuromuscular re-education, postural training, soft tissue mobilization, strengthening, stretching, therapeutic activities and transfer training  Other planned therapy interventions: KT taping  Treatment plan discussed with: patient  Plan details: 2 times per week for 6-8 weeks        Manual Therapy:    -     mins  15202;  Therapeutic Exercise:    -     mins  61589;     Neuromuscular Rosas:    -    mins  87215;    Therapeutic Activity:     -     mins  20380;     Gait Training:      -     mins  91180;     Ultrasound:     -     mins  82126;    Electrical  Stimulation:    -     mins  77996 ( );  Dry Needling     -     mins self-pay    Timed Treatment:   -   mins   Total Treatment:     55   mins    Only evaluation today, no treatment due to insurance requirements    PT SIGNATURE: Izabella Alistair, PT   DATE TREATMENT INITIATED: 11/19/2020    Initial Certification  Certification Period: 2/17/2021  I certify that the therapy services are furnished while this patient is under my care.  The services outlined above are required by this patient, and will be reviewed every 90 days.     PHYSICIAN: Adalberto Solo MD      DATE:     Please sign and return via fax to [phone]. Thank you, [facility].

## 2020-11-19 ENCOUNTER — TELEPHONE (OUTPATIENT)
Dept: ORTHOPEDIC SURGERY | Facility: CLINIC | Age: 50
End: 2020-11-19

## 2020-11-19 ENCOUNTER — TREATMENT (OUTPATIENT)
Dept: PHYSICAL THERAPY | Facility: CLINIC | Age: 50
End: 2020-11-19

## 2020-11-19 DIAGNOSIS — Z09 STATUS POST ORTHOPEDIC SURGERY, FOLLOW-UP EXAM: Primary | ICD-10-CM

## 2020-11-19 DIAGNOSIS — S72.144S CLOSED NONDISPLACED INTERTROCHANTERIC FRACTURE OF RIGHT FEMUR, SEQUELA: ICD-10-CM

## 2020-11-19 DIAGNOSIS — R29.898 RIGHT LEG WEAKNESS: ICD-10-CM

## 2020-11-19 DIAGNOSIS — R26.9 GAIT DISTURBANCE: ICD-10-CM

## 2020-11-19 DIAGNOSIS — M25.551 RIGHT HIP PAIN: Primary | ICD-10-CM

## 2020-11-19 PROCEDURE — 97163 PT EVAL HIGH COMPLEX 45 MIN: CPT | Performed by: PHYSICAL THERAPIST

## 2020-11-19 NOTE — PATIENT INSTRUCTIONS
Access Code: [email or web address]   URL: [email or web address]   Date: 11/19/2020   Prepared by: Izabella Sainz     Exercises  Long Sitting Quad Set with Towel Roll Under Heel - 10 reps - 5 hold - 1x daily - 7x weekly  Supine Short Arc Quad - 10 reps - 5 hold - 1x daily - 7x weekly  Supine Heel Slide - 10 reps - 5 hold - 1x daily - 7x weekly  Full Arc Quad - 10 reps - 5 hold - 1x daily - 7x weekly  Heel Toe Raises with Counter Support - 10 reps - 1x daily - 7x weekly  Standing March with Counter Support - 10 reps - 1x daily - 7x weekly  Standing Hip Abduction - 10 reps - 1x daily - 7x weekly  Standing Hip Extension with Counter Support - 10 reps - 1x daily - 7x weekly  Standing Knee Flexion AROM with Chair Support - 10 reps - 1x daily - 7x weekly  Sit to Stand - 5 reps - 2 sets - 1x daily - 7x weekly

## 2020-11-20 ENCOUNTER — TREATMENT (OUTPATIENT)
Dept: PHYSICAL THERAPY | Facility: CLINIC | Age: 50
End: 2020-11-20

## 2020-11-20 DIAGNOSIS — M25.551 RIGHT HIP PAIN: ICD-10-CM

## 2020-11-20 DIAGNOSIS — S72.144S CLOSED NONDISPLACED INTERTROCHANTERIC FRACTURE OF RIGHT FEMUR, SEQUELA: ICD-10-CM

## 2020-11-20 DIAGNOSIS — R26.9 GAIT DISTURBANCE: Primary | ICD-10-CM

## 2020-11-20 DIAGNOSIS — R29.898 RIGHT LEG WEAKNESS: ICD-10-CM

## 2020-11-20 PROCEDURE — 97116 GAIT TRAINING THERAPY: CPT | Performed by: PHYSICAL THERAPIST

## 2020-11-20 PROCEDURE — 97110 THERAPEUTIC EXERCISES: CPT | Performed by: PHYSICAL THERAPIST

## 2020-11-20 NOTE — PROGRESS NOTES
Physical Therapy Daily Progress Note      Patient: Simone Boykin   : 1970  Referring practitioner: Adalberto Solo MD  Date of Initial Visit: Type: THERAPY  Noted: 2020  Today's Date: 2020  Patient seen for 2 sessions         Simone Boykin reports: 3-4/10 R leg pain with reports of muscle soreness following evaluation and initiation of HEP.       Objective   See Exercise, Manual, and Modality Logs for complete treatment.       Assessment/Plan  Addition of Nustep for improved activity tolerance and muscle endurance this date. Pt instructed in dynamic gait with RWx forward, side stepping and backwards needing min A for backwards due to impaired balance; progressed to trial of quad cane and SPC with min A for stability and cues for mechanics and sequencing with cane. Pt demonstrates increased safety and stability with RWx and instructed to continue use of RWx at home until he gets more comfortable and confident witd decreased support using cane. Addition of bridges, hip ADD/ABD on therapy mat to improve proximal hip strength BLE. Plan to continue to progress exercises and gait training to improve strength, balance and safety with functional mobility at home.        Progress per Plan of Care and Progress strengthening /stabilization /functional activity       Timed:  Manual Therapy:    -     mins  85156;  Therapeutic Exercise:    31     mins  88171;     Neuromuscular Rosas:    -    mins  27004;    Therapeutic Activity:     -     mins  72327;     Gait Trainin     mins  24690;     Ultrasound:     -     mins  81040;    Electrical Stimulation:    -     mins  59275 ( );    Untimed:  Electrical Stimulation:    -     mins  41171 ( );  Mechanical Traction:    -     mins  94040;     Timed Treatment:   58   mins   Total Treatment:     62   mins  Izabella Sainz PT  Physical Therapist

## 2020-11-24 ENCOUNTER — OFFICE VISIT (OUTPATIENT)
Dept: ORTHOPEDIC SURGERY | Facility: CLINIC | Age: 50
End: 2020-11-24

## 2020-11-24 VITALS — HEIGHT: 72 IN | BODY MASS INDEX: 33.86 KG/M2 | WEIGHT: 250 LBS

## 2020-11-24 DIAGNOSIS — Z09 STATUS POST ORTHOPEDIC SURGERY, FOLLOW-UP EXAM: ICD-10-CM

## 2020-11-24 DIAGNOSIS — M25.551 RIGHT HIP PAIN: Primary | ICD-10-CM

## 2020-11-24 PROCEDURE — 99024 POSTOP FOLLOW-UP VISIT: CPT | Performed by: ORTHOPAEDIC SURGERY

## 2020-11-24 PROCEDURE — 73502 X-RAY EXAM HIP UNI 2-3 VIEWS: CPT | Performed by: ORTHOPAEDIC SURGERY

## 2020-11-24 PROCEDURE — 73552 X-RAY EXAM OF FEMUR 2/>: CPT | Performed by: ORTHOPAEDIC SURGERY

## 2020-11-24 RX ORDER — OXYCODONE HYDROCHLORIDE AND ACETAMINOPHEN 5; 325 MG/1; MG/1
1 TABLET ORAL EVERY 4 HOURS PRN
Qty: 42 TABLET | Refills: 0 | Status: SHIPPED | OUTPATIENT
Start: 2020-11-24 | End: 2020-12-07 | Stop reason: SDUPTHER

## 2020-11-24 NOTE — PROGRESS NOTES
CC: F/u s/p right hip intramedullary nail for treatment of intertrochanteric fracture, DOS 10/09/2020    SUBJECTIVE:Patient returns today for 6 week follow up of right hip intramedullary nail.  Patient reports continuing to do well no unusual complaints. Appears to be progressing appropriately. Denies fevers, chills, or sweats, no redness or drainage from incisions. Denies calf pain or swelling to bilateral lower extremities    EXAM:  Right hip- incisions well healed   No erythema or fluctuance   Some mild skin incision over top incision   Hip flexion 100, 4/5 strength   Hip extension 0, 4+/5    ER 45, 4/5 strength   IR 30, 4/5 strength   4+/5 strength on abduction and adduction   No calf pain, negative Homans sign bilateral lower extremities   Positive sensation right foot    IMAGING  Right hip and femur X-Ray  Indication: Status post hip nail  AP and Lateral views of hip and femur    Findings:  Stable alignment of intramedullary implant with moderate callous formation noted at fracture site, no displacement noted at fracture site. No evidence of perforation of cephalomedullary screw through the femoral head.    Compared to xrays from hospital.      ASSESSMENT: status post right hip intramedullary nail.    PLAN:   1. Continue work on range of motion and strengthening with physical therapy.  2. Weightbearing status: as tolerated.  3. Refilled Percocet today.   4. He may drive at this time. Advised him not to drive while taking pain medication.  5. Follow-up in 6 weeks, with repeat xrays of right femur.  6. All questions answered today      By signing my name here, I Sade James attest that all documentation on 11/24/20 at 14:04 EST has been prepared under the direction and in the presence of Dr. Adalberto Solo MD.    I, Dr. Adalberto Solo, personally performed the services described in this documentation, as scribed by Sade James, in my presence, and it is both accurate and complete.

## 2020-12-02 ENCOUNTER — TREATMENT (OUTPATIENT)
Dept: PHYSICAL THERAPY | Facility: CLINIC | Age: 50
End: 2020-12-02

## 2020-12-02 DIAGNOSIS — M25.551 RIGHT HIP PAIN: ICD-10-CM

## 2020-12-02 DIAGNOSIS — R29.898 RIGHT LEG WEAKNESS: ICD-10-CM

## 2020-12-02 DIAGNOSIS — S72.144S CLOSED NONDISPLACED INTERTROCHANTERIC FRACTURE OF RIGHT FEMUR, SEQUELA: ICD-10-CM

## 2020-12-02 DIAGNOSIS — R26.9 GAIT DISTURBANCE: Primary | ICD-10-CM

## 2020-12-02 PROCEDURE — 97110 THERAPEUTIC EXERCISES: CPT | Performed by: PHYSICAL THERAPIST

## 2020-12-02 NOTE — PROGRESS NOTES
Physical Therapy Daily Progress Note        Patient: Simone Boykin   : 1970  Diagnosis/ICD-10 Code:  Gait disturbance [R26.9]  Referring practitioner: Adalberto Solo MD  Date of Initial Visit: Type: THERAPY  Noted: 2020  Today's Date: 2020  Patient seen for 3 sessions         Simone Boykin reports: the weather has been making his leg bother him a little bit more recently.         Subjective     Objective          Active Range of Motion   Left Hip   Flexion: 65 (with SLR) degrees   Left Knee   Flexion: 110 degrees   Extension: Left knee active extension: lacking 8 deg.      General Comments     Ankle/Foot Comments   Pitting edema present below knee     See Exercise, Manual, and Modality Logs for complete treatment.       Assessment/Plan  Pt tolerated exercises well and demonstrated improved knee ROM.  He was also able to progress standing exercises decreasing UE support.  Utilized STC with amb and with multidirectional amb to increased balance and strength to amb in all directions and over objects to increase safety with amb in uneven/unexpected environments.  Discussed getting cane for use in home however pt reported he doesn't use AD much in the home.  Would like to progress balance and strength more prior to encouraging use of STC in community.  Will continue to progress as tolerated towards goals and previous level of function.     Progress per Plan of Care         Manual Therapy:         mins  56562;  Therapeutic Exercise:    40     mins  71553;     Neuromuscular Rosas:        mins  58117;    Therapeutic Activity:          mins  15360;     Gait Training:           mins  12791;     Ultrasound:          mins  62549;    Electrical Stimulation:         mins  83239 (MC );  Dry Needling          mins self-pay    Timed Treatment:   40   mins   Total Treatment:     55   mins    Tammie Seay PTA  Physical Therapist Assistant

## 2020-12-03 ENCOUNTER — TREATMENT (OUTPATIENT)
Dept: PHYSICAL THERAPY | Facility: CLINIC | Age: 50
End: 2020-12-03

## 2020-12-03 ENCOUNTER — OFFICE VISIT (OUTPATIENT)
Dept: FAMILY MEDICINE CLINIC | Facility: CLINIC | Age: 50
End: 2020-12-03

## 2020-12-03 VITALS
HEIGHT: 72 IN | SYSTOLIC BLOOD PRESSURE: 138 MMHG | RESPIRATION RATE: 16 BRPM | OXYGEN SATURATION: 98 % | TEMPERATURE: 97.6 F | DIASTOLIC BLOOD PRESSURE: 80 MMHG | HEART RATE: 107 BPM | BODY MASS INDEX: 34.95 KG/M2 | WEIGHT: 258 LBS

## 2020-12-03 DIAGNOSIS — Z09 STATUS POST ORTHOPEDIC SURGERY, FOLLOW-UP EXAM: ICD-10-CM

## 2020-12-03 DIAGNOSIS — R26.9 GAIT DISTURBANCE: Primary | ICD-10-CM

## 2020-12-03 DIAGNOSIS — E83.51 HYPOCALCEMIA: ICD-10-CM

## 2020-12-03 DIAGNOSIS — S72.144S CLOSED NONDISPLACED INTERTROCHANTERIC FRACTURE OF RIGHT FEMUR, SEQUELA: ICD-10-CM

## 2020-12-03 DIAGNOSIS — M25.551 RIGHT HIP PAIN: ICD-10-CM

## 2020-12-03 DIAGNOSIS — D62 ANEMIA DUE TO ACUTE BLOOD LOSS: Primary | ICD-10-CM

## 2020-12-03 DIAGNOSIS — R29.898 RIGHT LEG WEAKNESS: ICD-10-CM

## 2020-12-03 DIAGNOSIS — E83.42 HYPOMAGNESEMIA: ICD-10-CM

## 2020-12-03 DIAGNOSIS — Z09 HOSPITAL DISCHARGE FOLLOW-UP: ICD-10-CM

## 2020-12-03 PROBLEM — F10.939 ALCOHOL WITHDRAWAL (HCC): Status: ACTIVE | Noted: 2020-09-28

## 2020-12-03 PROBLEM — K92.1 MELENA: Status: ACTIVE | Noted: 2020-09-28

## 2020-12-03 PROCEDURE — 97116 GAIT TRAINING THERAPY: CPT | Performed by: PHYSICAL THERAPIST

## 2020-12-03 PROCEDURE — 97110 THERAPEUTIC EXERCISES: CPT | Performed by: PHYSICAL THERAPIST

## 2020-12-03 PROCEDURE — 99214 OFFICE O/P EST MOD 30 MIN: CPT | Performed by: PHYSICIAN ASSISTANT

## 2020-12-03 PROCEDURE — 97530 THERAPEUTIC ACTIVITIES: CPT | Performed by: PHYSICAL THERAPIST

## 2020-12-03 NOTE — PROGRESS NOTES
Physical Therapy Daily Progress Note      Patient: Simone Boykin   : 1970  Referring practitioner: Adalberto Solo MD  Date of Initial Visit: Type: THERAPY  Noted: 2020  Today's Date: 12/3/2020  Patient seen for 4 sessions         Simone Boykin reports: 3-4/10 R hip pain today; pt reports he is only using the walker in the morning when he gets up in the morning for stability.       Objective   See Exercise, Manual, and Modality Logs for complete treatment.     Increased R lower leg redness and 2+ edema R lower leg    RLE AROM 0- degrees    RLE PROM 0- degrees      Assessment/Plan  Pt able to progress reps of exercises this date with addition of HS stretch and consecutive sit to/from stands to improve transfers. Pt progress gait distance and laps with cane this date; instructed to look for a cane with small ALEKSANDR for use at home; addition of weaving around and stepping over obstacles to decrease risk of falls. Pt requires CGA with backward gait but only S for forwards, stepping over and weaving with use of cane. Pt also educated to elevated BLE due to swelling and redness in R lower leg. Pt reports pain in R knee with over pressure to improve knee extension. Plan to continue to progress balance and upright exercises to increase endurance, upright tolerance and safety with functional mobility at home and in the community.       Progress per Plan of Care and Progress strengthening /stabilization /functional activity           Timed:  Manual Therapy:    -     mins  46100;  Therapeutic Exercise:    31     mins  73469;     Neuromuscular Rosas:    -    mins  43719;    Therapeutic Activity:     9     mins  62992;     Gait Trainin     mins  72445;     Ultrasound:     -     mins  47449;    Electrical Stimulation:    -     mins  25120 ( );    Untimed:  Electrical Stimulation:    -     mins  24285 ( );  Mechanical Traction:    -     mins  34271;     Timed Treatment:    56   mins   Total Treatment:     59   mins  Izabella Sainz, PT  Physical Therapist

## 2020-12-03 NOTE — PROGRESS NOTES
Subjective   Simone Boykin is a 50 y.o. male presents for   Chief Complaint   Patient presents with   • Hip Pain     Hospital discharge   • Anemia       History of Present Illness     Simone is a 50 year old male who presents with hospital discharge from [facility].  He was admitted to [facility] on October 8, 2020 with discharge date of October 19, 2020.  States he had tripped while using his walker to ambulate.  The wheel got caught and tripped.  States he had been seen at [facility] a few days prior due to a fall as well.  States he fell over his dog at that time and hit his head.  His second fall he had right acute hip pain and right leg pain.  He was not able to bear weight.  He proceeded to [facility] ER.  There he was diagnosed with right femur and right hip fracture.  He underwent surgery.  States he was recently discharged from [facility] on November 13, 2020.  He has been seeing physical therapy at [place] twice weekly.  He was seen today in toe to proceed using a cane instead of walker.  Has follow-up appointments with Dr. Solo.  States his bowel movements have been daily without dark black tarry stools.  He has been taking the Eliquis as directed.  Has some pain medication only takes as needed.  Overall his appetite and sleep have been normal for him.  Has no complaints today.    The following portions of the patient's history were reviewed and updated as appropriate: allergies, current medications, past family history, past medical history, past social history, past surgical history and problem list.    Review of Systems   Constitutional: Negative.    HENT: Negative.    Eyes: Negative.    Respiratory: Negative.    Cardiovascular: Negative.    Gastrointestinal: Negative.    Endocrine: Negative.    Genitourinary: Negative.    Musculoskeletal: Positive for arthralgias.   Skin: Negative.    Allergic/Immunologic: Negative.   "  Neurological: Negative.    Hematological: Negative.    Psychiatric/Behavioral: Negative.  Negative for sleep disturbance.   All other systems reviewed and are negative.        Vitals:    12/03/20 1324   BP: 138/80   Pulse: 107   Resp: 16   Temp: 97.6 °F (36.4 °C)   SpO2: 98%   Weight: 117 kg (258 lb)   Height: 182.9 cm (72\")     Wt Readings from Last 3 Encounters:   12/03/20 117 kg (258 lb)   11/24/20 113 kg (250 lb)   10/27/20 113 kg (250 lb)     BP Readings from Last 3 Encounters:   12/03/20 138/80   10/27/20 140/80   10/19/20 139/69     Social History     Socioeconomic History   • Marital status: Single     Spouse name: Not on file   • Number of children: Not on file   • Years of education: Not on file   • Highest education level: Not on file   Tobacco Use   • Smoking status: Never Smoker   • Smokeless tobacco: Never Used   Substance and Sexual Activity   • Alcohol use: Yes     Comment: states no longer drinks daily   • Drug use: No   • Sexual activity: Defer       Allergies   Allergen Reactions   • Melatonin Delirium       Body mass index is 34.99 kg/m².    Objective   Physical Exam  Constitutional:       Appearance: Normal appearance. He is well-developed and well-groomed. He is obese.      Interventions: Face mask in place.      Comments: Ambulating with a walker   HENT:      Head: Normocephalic and atraumatic.   Neck:      Musculoskeletal: Neck supple.      Trachea: Trachea and phonation normal.   Cardiovascular:      Rate and Rhythm: Normal rate and regular rhythm.      Pulses: Normal pulses.      Heart sounds: Normal heart sounds, S1 normal and S2 normal. No murmur.   Pulmonary:      Effort: Pulmonary effort is normal.      Breath sounds: Normal breath sounds and air entry.   Abdominal:      General: Bowel sounds are normal.      Palpations: Abdomen is soft. There is no hepatomegaly.      Tenderness: There is no abdominal tenderness.   Musculoskeletal:      Right lower leg: No edema.      Left lower leg: " No edema.   Skin:     General: Skin is warm and dry.      Capillary Refill: Capillary refill takes less than 2 seconds.   Neurological:      Mental Status: He is alert and oriented to person, place, and time.   Psychiatric:         Attention and Perception: Attention and perception normal.         Mood and Affect: Mood and affect normal.         Speech: Speech normal.         Behavior: Behavior normal. Behavior is cooperative.         Thought Content: Thought content normal.         Cognition and Memory: Cognition and memory normal.         Judgment: Judgment normal.     I was wearing a surgical mask and face shield during the entire office visit/encounter.      Assessment/Plan   Diagnoses and all orders for this visit:    1. Anemia due to acute blood loss (Primary)  -     CBC & Differential    2. Hypocalcemia  -     Basic Metabolic Panel    3. Hypomagnesemia  -     Magnesium    4. Status post orthopedic surgery, follow-up exam    5. Hospital discharge follow-up    1.  New anemia status post right femur/hip surgery: I have reviewed his lab work that was performed at [facility].  He was admitted to [facility] on October 8, 2020 with discharge date of October 19, 2020.   His hemoglobin was 9.7 on October 7, 2020.     I will check a CBC at office visit today.  He will be notified of test results when completed.  I suspect this was related to the acute fracture.  2.  New hypomagnesemia and hypocalcemia: We will check a BMP and magnesium level today.  His calcium level was 8.7 on October 17, 2020.  Magnesium level was 1.8 on October 11, 2020.  He will be notified of test results when completed.  3.  New hospital discharge status post orthopedic surgery of right hip right femur: I have reviewed his hospital discharge paperwork from October 8, 2020 with discharge date of October 19, 2020.  He was discharged from [place] on November 13, 2020.  He will keep his follow-up appointments with his  orthopedist.      TRISTAN Conteh [place] [id number] [facility] [id number]  Dept: [phone]  Dept Fax: [phone]  Loc: [phone]  Loc Fax: [phone]

## 2020-12-04 LAB
BASOPHILS # BLD AUTO: 0 10*3/MM3 (ref 0–0.2)
BASOPHILS NFR BLD AUTO: 0 % (ref 0–1.5)
BUN SERPL-MCNC: 18 MG/DL (ref 6–20)
BUN/CREAT SERPL: 24.3 (ref 7–25)
CALCIUM SERPL-MCNC: 8.6 MG/DL (ref 8.6–10.5)
CHLORIDE SERPL-SCNC: 103 MMOL/L (ref 98–107)
CO2 SERPL-SCNC: 25.1 MMOL/L (ref 22–29)
CREAT SERPL-MCNC: 0.74 MG/DL (ref 0.76–1.27)
EOSINOPHIL # BLD AUTO: 0.33 10*3/MM3 (ref 0–0.4)
EOSINOPHIL NFR BLD AUTO: 6.9 % (ref 0.3–6.2)
ERYTHROCYTE [DISTWIDTH] IN BLOOD BY AUTOMATED COUNT: 13.7 % (ref 12.3–15.4)
GLUCOSE SERPL-MCNC: 104 MG/DL (ref 65–99)
HCT VFR BLD AUTO: 31.4 % (ref 37.5–51)
HGB BLD-MCNC: 10.3 G/DL (ref 13–17.7)
IMM GRANULOCYTES # BLD AUTO: 0.01 10*3/MM3 (ref 0–0.05)
IMM GRANULOCYTES NFR BLD AUTO: 0.2 % (ref 0–0.5)
LYMPHOCYTES # BLD AUTO: 1.2 10*3/MM3 (ref 0.7–3.1)
LYMPHOCYTES NFR BLD AUTO: 25.2 % (ref 19.6–45.3)
MAGNESIUM SERPL-MCNC: 1.6 MG/DL (ref 1.6–2.6)
MCH RBC QN AUTO: 28.1 PG (ref 26.6–33)
MCHC RBC AUTO-ENTMCNC: 32.8 G/DL (ref 31.5–35.7)
MCV RBC AUTO: 85.8 FL (ref 79–97)
MONOCYTES # BLD AUTO: 0.65 10*3/MM3 (ref 0.1–0.9)
MONOCYTES NFR BLD AUTO: 13.7 % (ref 5–12)
NEUTROPHILS # BLD AUTO: 2.57 10*3/MM3 (ref 1.7–7)
NEUTROPHILS NFR BLD AUTO: 54 % (ref 42.7–76)
NRBC BLD AUTO-RTO: 0 /100 WBC (ref 0–0.2)
PLATELET # BLD AUTO: 143 10*3/MM3 (ref 140–450)
POTASSIUM SERPL-SCNC: 3.9 MMOL/L (ref 3.5–5.2)
RBC # BLD AUTO: 3.66 10*6/MM3 (ref 4.14–5.8)
SODIUM SERPL-SCNC: 139 MMOL/L (ref 136–145)
WBC # BLD AUTO: 4.76 10*3/MM3 (ref 3.4–10.8)

## 2020-12-07 ENCOUNTER — TREATMENT (OUTPATIENT)
Dept: PHYSICAL THERAPY | Facility: CLINIC | Age: 50
End: 2020-12-07

## 2020-12-07 DIAGNOSIS — M25.551 RIGHT HIP PAIN: ICD-10-CM

## 2020-12-07 DIAGNOSIS — S72.144S CLOSED NONDISPLACED INTERTROCHANTERIC FRACTURE OF RIGHT FEMUR, SEQUELA: ICD-10-CM

## 2020-12-07 DIAGNOSIS — R29.898 RIGHT LEG WEAKNESS: ICD-10-CM

## 2020-12-07 DIAGNOSIS — R26.9 GAIT DISTURBANCE: Primary | ICD-10-CM

## 2020-12-07 DIAGNOSIS — Z09 STATUS POST ORTHOPEDIC SURGERY, FOLLOW-UP EXAM: ICD-10-CM

## 2020-12-07 PROCEDURE — 97112 NEUROMUSCULAR REEDUCATION: CPT | Performed by: PHYSICAL THERAPIST

## 2020-12-07 PROCEDURE — 97110 THERAPEUTIC EXERCISES: CPT | Performed by: PHYSICAL THERAPIST

## 2020-12-07 PROCEDURE — 97116 GAIT TRAINING THERAPY: CPT | Performed by: PHYSICAL THERAPIST

## 2020-12-07 RX ORDER — OXYCODONE HYDROCHLORIDE AND ACETAMINOPHEN 5; 325 MG/1; MG/1
1 TABLET ORAL EVERY 4 HOURS PRN
Qty: 42 TABLET | Refills: 0 | Status: SHIPPED | OUTPATIENT
Start: 2020-12-07 | End: 2020-12-30 | Stop reason: SDUPTHER

## 2020-12-07 NOTE — PROGRESS NOTES
"Physical Therapy Daily Progress Note    Patient: Simone Boykin   : 1970  Diagnosis/ICD-10 Code:  Gait disturbance [R26.9]  Referring practitioner: Adalberto Solo MD  Date of Initial Visit: Type: THERAPY  Noted: 2020  Today's Date: 2020  Patient seen for 5 sessions           Subjective I'm alright, it's cold and that makes it worse. The whole R side hurts, 8/10    Objective   See Exercise, Manual, and Modality Logs for complete treatment.   Pt with antalgic gait, salonpas patches on R knee, R ankle. Educated to focus on heel strike with gait.     Assessment/Plan Pt able to tolerate exercises today despite pain.  He reports his R hamstring is \"gone\" but able to perform hamstring curl against gravity and resisted knee flexion in sitting is 4+/5. Pt continuing to report moderate to severe R LE pain.            Timed:    Manual Therapy:         mins  93851;  Therapeutic Exercise:    16     mins  13069;     Neuromuscular Rosas:    14    mins  37641;    Therapeutic Activity:          mins  14981;     Gait Trainin     mins  84969;     Ultrasound:          mins  47442;    Electrical Stimulation:         mins  47164 ( );  Iontophoresis         mins 96970;  Aquatic Therapy         mins 12255;  Dry Needling                   mins    Untimed:  Electrical Stimulation:         mins  88460 ( );  Mechanical Traction:         mins  35818;     Timed Treatment:   38   mins   Total Treatment:     38   mins  Roxana Pacheco, PT  Physical Therapist  "

## 2020-12-11 ENCOUNTER — TREATMENT (OUTPATIENT)
Dept: PHYSICAL THERAPY | Facility: CLINIC | Age: 50
End: 2020-12-11

## 2020-12-11 DIAGNOSIS — M25.551 RIGHT HIP PAIN: ICD-10-CM

## 2020-12-11 DIAGNOSIS — S72.144S CLOSED NONDISPLACED INTERTROCHANTERIC FRACTURE OF RIGHT FEMUR, SEQUELA: ICD-10-CM

## 2020-12-11 DIAGNOSIS — R29.898 RIGHT LEG WEAKNESS: ICD-10-CM

## 2020-12-11 DIAGNOSIS — R26.9 GAIT DISTURBANCE: Primary | ICD-10-CM

## 2020-12-11 PROCEDURE — 97530 THERAPEUTIC ACTIVITIES: CPT | Performed by: PHYSICAL THERAPIST

## 2020-12-11 PROCEDURE — 97110 THERAPEUTIC EXERCISES: CPT | Performed by: PHYSICAL THERAPIST

## 2020-12-11 NOTE — PROGRESS NOTES
Physical Therapy Daily Progress Note    Visit # : 6  Simone Boykin reports: my hip is popping a cracking a lot.  I recently bought a cane.  I am careful going down the stairs at home.     Subjective     Objective   See Exercise, Manual, and Modality Logs for complete treatment.     Assessment/Plan  Pt is able to rise from a chair without UE assist.  Pt demonstrates good tolerance to ambulation with SPC with no LOB and should progress well from his rolling walker.  We discussed him being cautious around his 85# pit bull to reduce fall risk.  Good tolerance to ex progressions today.   Progress strengthening /stabilization /functional activity       Timed:  Manual Therapy:    -     mins  69020;  Therapeutic Exercise:    25     mins  37830;     Neuromuscular Rosas:    -    mins  38896;    Therapeutic Activity:     15     mins  10502;     Gait Training:      -     mins  78845;     Ultrasound:     -     mins  01356;    Iontophoresis                 -     mins 69621    Timed Treatment:   40   mins direct  Total Treatment:     40   mins      Kamille Loomis PT  Physical Therapist  [place] License # [id number]

## 2020-12-15 ENCOUNTER — TREATMENT (OUTPATIENT)
Dept: PHYSICAL THERAPY | Facility: CLINIC | Age: 50
End: 2020-12-15

## 2020-12-15 DIAGNOSIS — R26.9 GAIT DISTURBANCE: Primary | ICD-10-CM

## 2020-12-15 DIAGNOSIS — R29.898 RIGHT LEG WEAKNESS: ICD-10-CM

## 2020-12-15 DIAGNOSIS — M25.551 RIGHT HIP PAIN: ICD-10-CM

## 2020-12-15 DIAGNOSIS — S72.144S CLOSED NONDISPLACED INTERTROCHANTERIC FRACTURE OF RIGHT FEMUR, SEQUELA: ICD-10-CM

## 2020-12-15 PROCEDURE — 97110 THERAPEUTIC EXERCISES: CPT | Performed by: PHYSICAL THERAPIST

## 2020-12-15 PROCEDURE — 97530 THERAPEUTIC ACTIVITIES: CPT | Performed by: PHYSICAL THERAPIST

## 2020-12-15 NOTE — PROGRESS NOTES
Physical Therapy Daily Progress Note        Patient: Simone Boykin   : 1970  Diagnosis/ICD-10 Code:  Gait disturbance [R26.9]  Referring practitioner: Adalberto Solo MD  Date of Initial Visit: Type: THERAPY  Noted: 2020  Today's Date: 12/15/2020  Patient seen for 7 sessions         Simone Boykin reports: his leg isn't doing too great today.  He has been putting [place] decorations up today.  He rated his pain as 3-4/10.        Subjective     Objective          Ambulation   Weight-Bearing Status   Assistive device used: single point cane    Ambulation: Stairs     Additional Stairs Ambulation Details  Reciprocal with cues on 4 in stairs, non-reciprocal at home per report    Observational Gait   Gait: antalgic and asymmetric   Decreased walking speed, stride length, right stance time, left step length and right step length.     Additional Observational Gait Details  Wide ALEKSANDR, decreased weight shift, decreased toe off      See Exercise, Manual, and Modality Logs for complete treatment.       Assessment/Plan  Pt continued to demonstrate gait deviations as noted however has progressed to use STC in home and community.  Continued to progress strength this date without complaints of increased pain or symptoms.  Pt may benefit from addition of balance to further improve gait mechanics and functional mobility.  Will continue to monitor tolerance to exercises and progress as able towards goals and previous level of function.     Progress per Plan of Care           Manual Therapy:         mins  09152;  Therapeutic Exercise:    30     mins  38551;     Neuromuscular Rosas:    10    mins  07291;    Therapeutic Activity:          mins  39067;     Gait Training:           mins  38140;     Ultrasound:          mins  67855;    Electrical Stimulation:         mins  33130 ( );  Dry Needling          mins self-pay    Timed Treatment:   40   mins   Total Treatment:     57   mins    Tammie Seay  PTA  Physical Therapist Assistant

## 2020-12-22 ENCOUNTER — TREATMENT (OUTPATIENT)
Dept: PHYSICAL THERAPY | Facility: CLINIC | Age: 50
End: 2020-12-22

## 2020-12-22 DIAGNOSIS — R26.9 GAIT DISTURBANCE: Primary | ICD-10-CM

## 2020-12-22 DIAGNOSIS — S72.144S CLOSED NONDISPLACED INTERTROCHANTERIC FRACTURE OF RIGHT FEMUR, SEQUELA: ICD-10-CM

## 2020-12-22 DIAGNOSIS — R29.898 RIGHT LEG WEAKNESS: ICD-10-CM

## 2020-12-22 DIAGNOSIS — M25.551 RIGHT HIP PAIN: ICD-10-CM

## 2020-12-22 PROCEDURE — 97110 THERAPEUTIC EXERCISES: CPT | Performed by: PHYSICAL THERAPIST

## 2020-12-22 PROCEDURE — 97530 THERAPEUTIC ACTIVITIES: CPT | Performed by: PHYSICAL THERAPIST

## 2020-12-22 PROCEDURE — 97116 GAIT TRAINING THERAPY: CPT | Performed by: PHYSICAL THERAPIST

## 2020-12-22 NOTE — PROGRESS NOTES
"Re-Assessment / Re-Certification        Patient: Simone Boykin   : 1970  Diagnosis/ICD-10 Code:  Gait disturbance [R26.9]  Referring practitioner: Adalberto Solo MD  Date of Initial Visit: Type: THERAPY  Noted: 2020  Today's Date: 2020  Patient seen for 8 sessions      Subjective:   Simone Boykin reports: \"I am glad I am finally walking better. I know it will just take time.\"  Subjective Questionnaire: LEFS:   Clinical Progress: improved  Home Program Compliance: Yes  Treatment has included: therapeutic exercise, neuromuscular re-education, manual therapy, therapeutic activity, gait training, moist heat and cryotherapy    Objective     Observations      Right Hip  Positive for edema, effusion and incision.     Positive for B ankle edema    Additional Hip Observation Details  No s/s of infection in lateral R hip incision     Palpation      Right   No palpable tenderness to the adductor brevis, adductor longus, adductor za, lumbar paraspinals, obturator externus, quadratus lumborum, rectus femoris and sartorius.     Hypertonic in the TFL, piriformis.     Tenderness of the gluteus andrea, gluteus medius, iliopsoas, piriformis, proximal biceps femoris, proximal semimembranosus and proximal semitendinosus.      Tenderness      Right Hip   Tenderness in the greater trochanter. No tenderness in the ASIS, PSIS, iliac crest and sacroiliac joint.      Lumbar Screen  Lumbar range of motion within normal limits.     Neurological Testing      Sensation      Hip   Left Hip   Intact: light touch     Right Hip   Intact: light touch     Reflexes   Left   Patellar (L4): normal (2+)  Achilles (S1): trace (1+)     Right   Patellar (L4): normal (2+)  Achilles (S1): trace (1+)     Active Range of Motion      Right Hip   Flexion: 82 (with SLR in supine) degrees with pain  Extension: 32 degrees without pain  Abduction: 34 (sidelying) degrees with pain  Adduction: with pain  External rotation " (prone): 48 degrees with pain  Internal rotation (prone): 28 degrees with pain    Additional Active Range of Motion Details  R knee flexion (seated): 107 degrees    R knee flexion: 117 degrees  R knee extension: lacking 11 degrees     Joint Play      Right Hip     Hypomobile in the posterior hip capsule and anterior hip capsule     Strength/Myotome Testing      Right Hip   Planes of Motion   Flexion: 4-  Extension: 4  Abduction: 4-  Adduction: 4+  External rotation: 4-  Internal rotation: 3-     Tests      Right Hip   Positive Ely's, BURTON and FADIR.   Negative scour.     Additional Tests Details  Negative Janay's sign RLE     Ambulation      Observational Gait   Gait: antalgic   Left stance time, left swing time and left step length within functional limits. Decreased walking speed, stride length, right stance time, right swing time and right step length.   Left foot contact pattern: heel to toe  Right foot contact pattern: heel to toe  Base of support: wide    Progressed from RWx to SBQC to no AD     Functional Assessment   Squat   No pain; only with initial movement     Comments  5 time sit to/from stand; no UE support from standard chair: 8.4 sec improved from 15.4 sec      General Comments      Hip Comments   Swelling B ankles  R ankle: 29 cm  L ankle: 31 cm        Assessment & Plan     Assessment  Impairments: abnormal gait, abnormal muscle firing, abnormal muscle tone, abnormal or restricted ROM, activity intolerance, impaired balance, impaired physical strength, lacks appropriate home exercise program, pain with function, safety issue and weight-bearing intolerance  Assessment details: Simone Boykin is a 50 year-old male referred to physical therapy following R proximal femur fracture due to fall. He presents with a stable clinical presentation.  He has comorbidities of history of R knee surgery and injury due to playing rugby, anxiety/depression and personal factors of history of alcohol abuse and  inability to drive that may affect his progress in the plan of care. Patient is appropriate for skilled physical therapy in order to reduce pain and increase ease with daily mobility. He presents with healing incision with mild edema but no s/s of infection.  He has lateral and posterior hip tenderness, muscle weakness in hip and knee, and decreased ROM in his hip/knee. Pt has progressed gait from RWx to SBQC to no AD with mild antalgic pattern. Pt demonstrates improving activity tolerance, endurance and LE strength however lacking proximal hip and core strength. Pt instructed to continue to ice and elevated BLE at the end of each day. Based on the above findings he is a good candidate for therapy to decrease pain, regain ROM and flexibility, and improve strength allowing him to return to ADL's without AD and be able to garden in the spring. Pt reports he knows he won't be able to play rugby but wants to be able to walk his dog comfortably. During therapy, pt educated on anatomy, goal of interventions, sleeping position, initial HEP, importance of prone positioning to decrease hip flexor tightness and body mechanics to promote healthy lifestyle and improve quality of life.     Prognosis: good  Functional Limitations: carrying objects, sleeping, walking, pushing, uncomfortable because of pain, moving in bed, sitting, standing, stooping and unable to perform repetitive tasks  Goals  Plan Goals: Plan Goals: STG x 4 weeks  1.  Patient will demonstrate increase hip IR >25 degrees.-MET  2.  Pt will improve R knee AROM 5-120 degrees-PROGRESSING  3.  Patient will demonstrate increase in R hip strength to grossly 4/5-PROGRESSING  4.  Ambulate without AD with mild antalgic gait pattern-MET  5.  Ascend stairs in a reciprocal pattern with railing-PROGRESSING (step to pattern)    LTG x 8 weeks  1.  Bilateral hip AROM with tolerable symptoms.-PROGRESSING  2.  Squat with proper mechanics to pick something up off  floor-PROGRESSING  3.  Patient to don socks/shoes without pain-PROGRESSING   4.  Decrease 5 time sit to/from stand time to 12 sec or less-MET  5.  In/out of car with minimal discomfort.-PROGRESSING    Plan  Therapy options: will be seen for skilled physical therapy services  Planned modality interventions: cryotherapy, thermotherapy (hydrocollator packs), ultrasound, TENS, dry needling and electrical stimulation/Russian stimulation  Planned therapy interventions: abdominal trunk stabilization, ADL retraining, balance/weight-bearing training, body mechanics training, flexibility, functional ROM exercises, gait training, home exercise program, manual therapy, neuromuscular re-education, postural training, soft tissue mobilization, strengthening, stretching, therapeutic activities and transfer training  Other planned therapy interventions: KT taping  Treatment plan discussed with: patient  Plan details: 2 times per week for 4-6 weeks        Visit Diagnoses:    ICD-10-CM ICD-9-CM   1. Gait disturbance  R26.9 781.2   2. Right hip pain  M25.551 719.45   3. Closed nondisplaced intertrochanteric fracture of right femur, sequela  S72.144S 905.3   4. Right leg weakness  R29.898 729.89       PT Signature: Izabella Sainz, PT      Timed:  Manual Therapy:    5     mins  45401;  Therapeutic Exercise:    33     mins  29870;     Neuromuscular Rosas:    -    mins  47628;    Therapeutic Activity:     9     mins  38857;     Gait Trainin     mins  00194;     Ultrasound:     -     mins  09301;    Electrical Stimulation:    -     mins  01026 ( );    Untimed:  Electrical Stimulation:    -     mins  31448 ( );  Mechanical Traction:    -     mins  15933;     Timed Treatment:   58   mins   Total Treatment:     65   mins

## 2020-12-29 ENCOUNTER — TREATMENT (OUTPATIENT)
Dept: PHYSICAL THERAPY | Facility: CLINIC | Age: 50
End: 2020-12-29

## 2020-12-29 DIAGNOSIS — R26.9 GAIT DISTURBANCE: Primary | ICD-10-CM

## 2020-12-29 DIAGNOSIS — R29.898 RIGHT LEG WEAKNESS: ICD-10-CM

## 2020-12-29 DIAGNOSIS — S72.144S CLOSED NONDISPLACED INTERTROCHANTERIC FRACTURE OF RIGHT FEMUR, SEQUELA: ICD-10-CM

## 2020-12-29 DIAGNOSIS — M25.551 RIGHT HIP PAIN: ICD-10-CM

## 2020-12-29 PROCEDURE — 97110 THERAPEUTIC EXERCISES: CPT | Performed by: PHYSICAL THERAPIST

## 2020-12-29 PROCEDURE — 97530 THERAPEUTIC ACTIVITIES: CPT | Performed by: PHYSICAL THERAPIST

## 2020-12-29 NOTE — PATIENT INSTRUCTIONS
Access Code: [email or web address]   URL: [email or web address]   Date: 12/29/2020   Prepared by: Kamille Loomis     Exercises  Supine Active Straight Leg Raise - 10 reps - 1 sets - 3 hold - 1x daily  Modified Hany Stretch - 3 reps - 1 sets - 20 hold - 1x daily

## 2020-12-29 NOTE — PROGRESS NOTES
Physical Therapy Daily Progress Note    Visit # : 9  Simone Boykin reports: My hip still hurts but is doing better.  I was sore for a couple of days after last visit    Subjective     Objective   See Exercise, Manual, and Modality Logs for complete treatment.   Pt is ambulating with small quad cane    Assessment/Plan  Pt demonstrated ability to ambulate within clinic safely in an antalgic gait with SBA for up to 60'.  Pt was issued updated HEP printout to facilitate compliance and recall with ex progressions today.  Progress strengthening /stabilization /functional activity       Timed:  Manual Therapy:    -     mins  46481;  Therapeutic Exercise:    32     mins  89200;     Neuromuscular Rosas:    -    mins  65140;    Therapeutic Activity:     10     mins  51228;     Gait Training:      -     mins  44931;     Ultrasound:     -     mins  00383;    Iontophoresis                 -     mins 87590    Timed Treatment:   42   mins   Total Treatment:     42   mins      Kamille Loomis PT  Physical Therapist  [place] License # [id number]

## 2020-12-30 DIAGNOSIS — Z09 STATUS POST ORTHOPEDIC SURGERY, FOLLOW-UP EXAM: ICD-10-CM

## 2020-12-30 RX ORDER — OXYCODONE HYDROCHLORIDE AND ACETAMINOPHEN 5; 325 MG/1; MG/1
TABLET ORAL
Qty: 36 TABLET | Refills: 0 | Status: SHIPPED | OUTPATIENT
Start: 2020-12-30

## 2020-12-30 RX ORDER — OXYCODONE HYDROCHLORIDE AND ACETAMINOPHEN 5; 325 MG/1; MG/1
1 TABLET ORAL EVERY 4 HOURS PRN
Qty: 42 TABLET | Refills: 0 | Status: CANCELLED | OUTPATIENT
Start: 2020-12-30

## 2020-12-30 RX ORDER — OXYCODONE HYDROCHLORIDE AND ACETAMINOPHEN 5; 325 MG/1; MG/1
1 TABLET ORAL EVERY 6 HOURS PRN
Qty: 3265 TABLET | Refills: 0 | Status: SHIPPED | OUTPATIENT
Start: 2020-12-30 | End: 2020-12-30 | Stop reason: SDUPTHER

## 2020-12-31 ENCOUNTER — TREATMENT (OUTPATIENT)
Dept: PHYSICAL THERAPY | Facility: CLINIC | Age: 50
End: 2020-12-31

## 2020-12-31 DIAGNOSIS — S72.144S CLOSED NONDISPLACED INTERTROCHANTERIC FRACTURE OF RIGHT FEMUR, SEQUELA: ICD-10-CM

## 2020-12-31 DIAGNOSIS — R29.898 RIGHT LEG WEAKNESS: ICD-10-CM

## 2020-12-31 DIAGNOSIS — R26.9 GAIT DISTURBANCE: Primary | ICD-10-CM

## 2020-12-31 DIAGNOSIS — M25.551 RIGHT HIP PAIN: ICD-10-CM

## 2020-12-31 PROCEDURE — 97530 THERAPEUTIC ACTIVITIES: CPT | Performed by: PHYSICAL THERAPIST

## 2020-12-31 PROCEDURE — 97110 THERAPEUTIC EXERCISES: CPT | Performed by: PHYSICAL THERAPIST

## 2020-12-31 NOTE — PROGRESS NOTES
"   Physical Therapy Daily Progress Note      Patient: Simone Boykin   : 1970  Referring practitioner: Adalberto Solo MD  Date of Initial Visit: Type: THERAPY  Noted: 2020  Today's Date: 2020  Patient seen for 10 sessions         Simone Boykin reports: \"No pain, just feels like I got hit by a pitch in the batter's box.\"       Objective   See Exercise, Manual, and Modality Logs for complete treatment.       Assessment/Plan  Pt progressing well able to complete 4 inch steps reciprocally and complete gait without AD however increased antalgic pattern with support of cane. Pt demonstrates increased lateral trunk lean in stance and wide ALEKSANDR without AD; instructed to use cane to improve quality of gait in the community but practice at home without cane. Pt demonstrates sig difficulty with sidelying hip ABD motion needing min A at ankle; plan to progress to address lateral hip weakness. Plan to continue to progress exercises per pt's tolerance and continue gait training to improve quality of gait.       Progress per Plan of Care           Timed:  Manual Therapy:    -     mins  30399;  Therapeutic Exercise:    34     mins  75954;     Neuromuscular Rosas:    -    mins  90936;    Therapeutic Activity:     14     mins  09099;     Gait Training:      -     mins  82887;     Ultrasound:     -     mins  30774;    Electrical Stimulation:    -     mins  75016 ( );    Untimed:  Electrical Stimulation:    -     mins  23170 ( );  Mechanical Traction:    -     mins  18375;     Timed Treatment:   48   mins   Total Treatment:     50   mins  Izabella Sainz PT  Physical Therapist                  "

## 2021-01-12 ENCOUNTER — TREATMENT (OUTPATIENT)
Dept: PHYSICAL THERAPY | Facility: CLINIC | Age: 51
End: 2021-01-12

## 2021-01-12 ENCOUNTER — OFFICE VISIT (OUTPATIENT)
Dept: ORTHOPEDIC SURGERY | Facility: CLINIC | Age: 51
End: 2021-01-12

## 2021-01-12 VITALS — HEIGHT: 72 IN | WEIGHT: 258 LBS | BODY MASS INDEX: 34.95 KG/M2

## 2021-01-12 DIAGNOSIS — S72.144S CLOSED NONDISPLACED INTERTROCHANTERIC FRACTURE OF RIGHT FEMUR, SEQUELA: ICD-10-CM

## 2021-01-12 DIAGNOSIS — R29.898 RIGHT LEG WEAKNESS: ICD-10-CM

## 2021-01-12 DIAGNOSIS — R26.9 GAIT DISTURBANCE: Primary | ICD-10-CM

## 2021-01-12 DIAGNOSIS — Z09 STATUS POST ORTHOPEDIC SURGERY, FOLLOW-UP EXAM: Primary | ICD-10-CM

## 2021-01-12 DIAGNOSIS — M25.551 RIGHT HIP PAIN: ICD-10-CM

## 2021-01-12 PROCEDURE — 73552 X-RAY EXAM OF FEMUR 2/>: CPT | Performed by: ORTHOPAEDIC SURGERY

## 2021-01-12 PROCEDURE — 99212 OFFICE O/P EST SF 10 MIN: CPT | Performed by: ORTHOPAEDIC SURGERY

## 2021-01-12 PROCEDURE — 97110 THERAPEUTIC EXERCISES: CPT | Performed by: PHYSICAL THERAPIST

## 2021-01-12 PROCEDURE — 97530 THERAPEUTIC ACTIVITIES: CPT | Performed by: PHYSICAL THERAPIST

## 2021-01-12 NOTE — PROGRESS NOTES
Subjective:     Patient ID: Simone Boykin is a 50 y.o. male.    Chief Complaint:  Follow-up status post right hip intramedullary nail for treatment of intertrochanteric fracture, October 9, 2020  History of Present Illness  Simone Boykin returns to clinic today for evaluation of right hip, he is doing very well at this point time, making good progress with continued physical therapy.  He is using a cane just for assistance while walking.  He states he does feel stable on his right side.  Denies any focal pain.     Social History     Occupational History   • Not on file   Tobacco Use   • Smoking status: Never Smoker   • Smokeless tobacco: Never Used   Substance and Sexual Activity   • Alcohol use: Yes     Comment: states no longer drinks daily   • Drug use: No   • Sexual activity: Defer      Past Medical History:   Diagnosis Date   • Anxiety    • Arthritis     JUAN KNEES   • Depression    • Fall    • GERD (gastroesophageal reflux disease)    • Heartburn     ON OCC   • Mass     BASE OF POSTERIOR NECK   • Shoulder dislocation     LONG TERM ISSUES WITH     Past Surgical History:   Procedure Laterality Date   • EXCISION MASS HEAD/NECK N/A 4/20/2016    Procedure: MASS SOFT TISSUE EXCISION POSTERIOR NECK;  Surgeon: Crow Ayala Jr., MD;  [facility];  Service:    • HIP INTERTROCHANTERIC NAILING Right 10/9/2020    Procedure: HIP INTERTROCHANTERIC NAILING;  Surgeon: Adalberto Solo MD;  Location: [facility] OR;  Service: Orthopedics;  Laterality: Right;   • KNEE ARTHROSCOPY Right        Family History   Problem Relation Age of Onset   • Heart disease Father    • Thyroid disease Brother          Review of Systems   Constitutional: Negative for chills, diaphoresis, fever and unexpected weight change.   HENT: Negative for hearing loss, nosebleeds, sore throat and tinnitus.    Eyes: Negative for pain and visual disturbance.   Respiratory: Negative for cough, shortness of breath and wheezing.   "  Cardiovascular: Negative for chest pain and palpitations.   Gastrointestinal: Negative for abdominal pain, diarrhea, nausea and vomiting.   Endocrine: Negative for cold intolerance, heat intolerance and polydipsia.   Genitourinary: Negative for difficulty urinating, dysuria and hematuria.   Musculoskeletal: Positive for arthralgias and myalgias. Negative for joint swelling.   Skin: Negative for rash and wound.   Allergic/Immunologic: Negative for environmental allergies.   Neurological: Negative for dizziness, syncope and numbness.   Hematological: Does not bruise/bleed easily.   Psychiatric/Behavioral: Negative for dysphoric mood and sleep disturbance. The patient is not nervous/anxious.            Objective:  Vitals:    01/12/21 1457   Weight: 117 kg (258 lb)   Height: 182.9 cm (72\")         01/12/21  1457   Weight: 117 kg (258 lb)     Body mass index is 34.99 kg/m².  General: No acute distress.  Resp: normal respiratory effort  Skin: no rashes or wounds; normal turgor  Psych: mood and affect appropriate; recent and remote memory intact          Ortho Exam     Right hip-hip flexion 115 degrees with 4 out of 5 strength, extension of 0 with 4 out of 5 strength, internal rotation of 25 degrees external rotation of 40 degrees with 4 out of 5 strength in all planes.  Lateral incisions are well-healed.  No tenderness over trochanteric bursa.  Positive sensation light touch all distributions right foot symmetric to the left, 1+ dorsalis pedis pulse.      Imaging:  Right Hip and femur X-Ray  Indication: Status post right hip nail  AP pelvis and Frog Leg views, AP and lateral views right femur    Findings:  Intramedullary nail appears to be in stable position with good evidence of callus formation proximally at primary fracture site, there is evidence of some heterotopic ossification proximal to the tip of the greater trochanter as well as medial to the lesser trochanter, femoral acetabular joint appears to be " well-maintained.  No evidence of perforation of cephalomedullary screw through the femoral head.    Compared to prior office x-rays      Assessment:        1. Status post orthopedic surgery, IM hip nail right hip, DOS 10/09/2020           Plan:          1. Discussed treatment options at length with patient at today's visit.  Patient has good progress in regards to his functional activities, recommended continued stretching for his hip flexion as well as strengthening activities using therapy bands, continue therapy as tolerated.  2. I will see patient back in 2 months with repeat x-rays of right hip and femur at that time.      Simone Boykin was in agreement with plan and had all questions answered.     Orders:  Orders Placed This Encounter   Procedures   • XR Femur 2 View Right       Medications:  No orders of the defined types were placed in this encounter.      Followup:  No follow-ups on file.    Diagnoses and all orders for this visit:    1. Status post orthopedic surgery, IM hip nail right hip, DOS 10/09/2020 (Primary)  -     XR Femur 2 View Right          Dictated utilizing Dragon dictation

## 2021-01-12 NOTE — PROGRESS NOTES
Physical Therapy Daily Progress Note        Patient: Simone Boykin   : 1970  Diagnosis/ICD-10 Code:  Gait disturbance [R26.9]  Referring practitioner: Adalberto Solo MD  Date of Initial Visit: Type: THERAPY  Noted: 2020  Today's Date: 2021  Patient seen for 11 sessions         Simone Boykin reports: he is stiff/tight.  He can feel where the screws are but other than that it isn't too bad.  Pt reported he had a breakdown last week but was able to talk to a friend with a psych degree and is doing better now.         Subjective     Objective          Ambulation     Observational Gait   Gait: antalgic   Decreased walking speed and stride length.     Additional Observational Gait Details  Wide ALEKSANDR; decreased weight shift      See Exercise, Manual, and Modality Logs for complete treatment.       Assessment/Plan  Pt able to amb without AD however gait deviations as noted above thus added balance training with cues to focus on minimal UE support.  Able to progress strengthening this date however frequent cuing required for proper pacing and technique with exercises.  Fair compliance with cues this date.  Increased greater trochanter pain this date with pt report of feeling hip feeling tight thus added standing IT band stretch and supine modified piriformis stretch to address.  Pt unable to assume typical positioning for piriformis stretch thus had pt complete with contralateral LE ext.   Pt would benefit from continued progression of strength and balance to improve safety with amb and decrease risk of falls.     Progress per Plan of Care           Manual Therapy:         mins  22269;  Therapeutic Exercise:    37     mins  90584;     Neuromuscular Rosas:        mins  03190;    Therapeutic Activity:     10     mins  58931;     Gait Training:           mins  99965;     Ultrasound:          mins  17820;    Electrical Stimulation:         mins  34132 ( );  Dry Needling          mins  self-pay    Timed Treatment:   47   mins   Total Treatment:     57   mins    Tammie Seay PTA  Physical Therapist Assistant

## 2021-01-19 ENCOUNTER — TREATMENT (OUTPATIENT)
Dept: PHYSICAL THERAPY | Facility: CLINIC | Age: 51
End: 2021-01-19

## 2021-01-19 DIAGNOSIS — R26.9 GAIT DISTURBANCE: Primary | ICD-10-CM

## 2021-01-19 DIAGNOSIS — R29.898 RIGHT LEG WEAKNESS: ICD-10-CM

## 2021-01-19 DIAGNOSIS — M25.551 RIGHT HIP PAIN: ICD-10-CM

## 2021-01-19 DIAGNOSIS — S72.144S CLOSED NONDISPLACED INTERTROCHANTERIC FRACTURE OF RIGHT FEMUR, SEQUELA: ICD-10-CM

## 2021-01-19 PROCEDURE — 97110 THERAPEUTIC EXERCISES: CPT | Performed by: PHYSICAL THERAPIST

## 2021-01-19 PROCEDURE — 97530 THERAPEUTIC ACTIVITIES: CPT | Performed by: PHYSICAL THERAPIST

## 2021-01-19 NOTE — PROGRESS NOTES
Physical Therapy Daily Progress Note        Patient: Simone Boykin   : 1970  Diagnosis/ICD-10 Code:  Gait disturbance [R26.9]  Referring practitioner: Adalberto Solo MD  Date of Initial Visit: Type: THERAPY  Noted: 2020  Today's Date: 2021  Patient seen for 12 sessions         Simone Boykin reports: no problems with his hip since last session.  He is still having some back pain.         Subjective     Objective          Ambulation     Observational Gait   Gait: antalgic   Decreased walking speed and stride length.     Additional Observational Gait Details  Wide ALEKSANDR, decreased weight shift, decreased knee flexion, decreased hip ext      See Exercise, Manual, and Modality Logs for complete treatment.       Assessment/Plan  Pt continued to demonstrate gait deviations as noted which were present with and without AD.  He reported back pain during the session requiring intermittent therapeutic rest breaks or repositioning to assist with tolerance.  Maintained exercises this date due to back pain however continued to encourage decreased support with balance activities to assist with improved gait mechanics.  Pt may benefit from addition of cup amb to further assist with gait mechanics.  Will continue to monitor tolerance to exercises and back pain to progress within tolerance and improve overall function.       Progress per Plan of Care           Manual Therapy:         mins  02156;  Therapeutic Exercise:    62     mins  47974;     Neuromuscular Rosas:        mins  78762;    Therapeutic Activity:     10     mins  95169;     Gait Training:           mins  93908;     Ultrasound:          mins  67722;    Electrical Stimulation:         mins  03651 ( );  Dry Needling          mins self-pay    Timed Treatment:   72   mins   Total Treatment:     72   mins    Tammie Seay PTA  Physical Therapist Assistant

## 2021-01-20 NOTE — PROGRESS NOTES
"Re-Assessment/Re-certification    Patient: Simone Boykin   : 1970  Diagnosis/ICD-10 Code:  Gait disturbance [R26.9]  Referring practitioner: Adalberto Solo MD  Date of Initial Visit: Type: THERAPY  Noted: 2020  Today's Date: 2021  Patient seen for 13 sessions      Subjective:   Simone Boykin reports: 0/10 pain \"just stiff\"  Subjective Questionnaire:  previously LEFS:   Clinical Progress: improved  Home Program Compliance: No  Treatment has included: therapeutic exercise, neuromuscular re-education, manual therapy, therapeutic activity, gait training, electrical stimulation, ultrasound, moist heat and cryotherapy    Objective   Observations      Right Hip  Positive for edema, effusion and incision.      Positive for B ankle edema    Additional Hip Observation Details  No s/s of infection in lateral R hip incision     Palpation      Right   No palpable tenderness to the adductor brevis, adductor longus, adductor za, lumbar paraspinals, obturator externus, quadratus lumborum, rectus femoris and sartorius.     Hypertonic in the TFL, piriformis, ITBand.      Tenderness of the gluteus andrea, gluteus medius, iliopsoas, piriformis     Tenderness      Right Hip   Tenderness in the greater trochanter. No tenderness in the ASIS, PSIS, iliac crest and sacroiliac joint.      Lumbar Screen  Lumbar range of motion within normal limits.     Active Range of Motion      Right Hip   Flexion: 78 degrees (SLR in supine) with pain  Extension: 19 degrees (in standing) without pain  Abduction: 27 (sidelying) degrees with pain  External rotation (prone): 42 degrees with pain  Internal rotation (prone): 30 degrees with pain    R knee flexion: 115 degrees  R knee extension: lacking 8 degrees     Joint Play      Right Hip     Hypomobile in the posterior, lateral and anterior hip capsule     Strength/Myotome Testing      Right Hip   Planes of Motion   Flexion: 4  Extension: 4  Abduction: " 4-  Adduction: 4+  External rotation: 3-  Internal rotation: 3-     Tests      Right Hip   Positive Ely's, BURTON and FADIR.   Negative scour.     Additional Tests Details  Negative Janay's sign RLE    TUG test: 16.8 sec with cane  5x sit to/from stand: 11 sec      Assessment & Plan     Assessment  Impairments: abnormal gait, abnormal muscle firing, abnormal muscle tone, abnormal or restricted ROM, activity intolerance, impaired balance, impaired physical strength, lacks appropriate home exercise program, pain with function, safety issue and weight-bearing intolerance  Assessment details: Simone Boykin is a 50 year-old male referred to physical therapy following R proximal femur fracture due to fall. He presents with a stable clinical presentation.  He has comorbidities of history of R knee surgery and injury due to playing rugby, anxiety/depression and personal factors of history of alcohol abuse and inability to drive that may affect his progress in the plan of care. Patient is appropriate for skilled physical therapy in order to reduce pain and increase ease with daily mobility. He presents with healing incision with mild edema but no s/s of infection.  He has lateral and posterior hip tenderness, muscle weakness in hip and knee, and decreased ROM in his hip/knee. Pt has progressed gait from RWx to SBQC to no AD with mild antalgic pattern. Pt demonstrates improving activity tolerance, endurance and LE strength however lacking proximal hip and core strength. Pt instructed to continue to ice and elevated BLE at the end of each day. Based on the above findings he is a good candidate for therapy to decrease pain, regain ROM and flexibility, and improve strength allowing him to return to ADL's without AD and be able to garden in the spring. Plan to discharge patient next week; 2 more sessions to finalize HEP handout. During therapy, pt educated on anatomy, goal of interventions, sleeping position, initial HEP,  importance of prone positioning to decrease hip flexor tightness and body mechanics to promote healthy lifestyle and improve quality of life.     Prognosis: good  Functional Limitations: carrying objects, sleeping, walking, pushing, uncomfortable because of pain, moving in bed, sitting, standing, stooping and unable to perform repetitive tasks  Goals  Plan Goals: Plan Goals: STG x 4 weeks  1.  Patient will demonstrate increase hip IR >25 degrees.-PROGRESSING  2.  Pt will improve R knee AROM 5-120 degrees-PROGRESSING  3.  Patient will demonstrate increase in R hip strength to grossly 4/5-MET  4.  Ambulate without AD with mild antalgic gait pattern-MET  5.  Ascend stairs in a reciprocal pattern with railing-PROGRESSING (step to pattern descending)    LTG x 8 weeks  1.  Bilateral hip AROM with tolerable symptoms.-MET  2.  Squat with proper mechanics to pick something up off floor-MET  3.  Patient to don socks/shoes without pain-PROGRESSING   4.  Decrease 5 time sit to/from stand time to 12 sec or less-MET  5.  In/out of car with minimal discomfort.-PROGRESSING (using mom's SUV due to his care being lower to the ground)    Plan  Therapy options: will be seen for skilled physical therapy services  Planned modality interventions: cryotherapy, thermotherapy (hydrocollator packs), ultrasound, TENS, dry needling and electrical stimulation/Russian stimulation  Planned therapy interventions: abdominal trunk stabilization, ADL retraining, balance/weight-bearing training, body mechanics training, flexibility, functional ROM exercises, gait training, home exercise program, manual therapy, neuromuscular re-education, postural training, soft tissue mobilization, strengthening, stretching, therapeutic activities and transfer training  Other planned therapy interventions: KT taping  Duration in visits: 2  Treatment plan discussed with: patient  Plan details: 2 more sessions        Visit Diagnoses:    ICD-10-CM ICD-9-CM   1. Gait  disturbance  R26.9 781.2   2. Right hip pain  M25.551 719.45   3. Closed nondisplaced intertrochanteric fracture of right femur, sequela  S72.144S 905.3   4. Right leg weakness  R29.898 729.89       PT Signature: Izabella Sainz, ADY      Timed:  Manual Therapy:    -     mins  10807;  Therapeutic Exercise:    33     mins  58072;     Neuromuscular Rosas:    -    mins  99504;    Therapeutic Activity:     14     mins  05665;     Gait Trainin     mins  51525;     Ultrasound:     -     mins  13971;    Electrical Stimulation:    -     mins  99189 ( );    Untimed:  Electrical Stimulation:    -     mins  28612 ( );  Mechanical Traction:    -     mins  29639;     Timed Treatment:   55   mins   Total Treatment:     60   mins

## 2021-01-21 ENCOUNTER — TREATMENT (OUTPATIENT)
Dept: PHYSICAL THERAPY | Facility: CLINIC | Age: 51
End: 2021-01-21

## 2021-01-21 DIAGNOSIS — R29.898 RIGHT LEG WEAKNESS: ICD-10-CM

## 2021-01-21 DIAGNOSIS — S72.144S CLOSED NONDISPLACED INTERTROCHANTERIC FRACTURE OF RIGHT FEMUR, SEQUELA: ICD-10-CM

## 2021-01-21 DIAGNOSIS — M25.551 RIGHT HIP PAIN: ICD-10-CM

## 2021-01-21 DIAGNOSIS — R26.9 GAIT DISTURBANCE: Primary | ICD-10-CM

## 2021-01-21 PROCEDURE — 97110 THERAPEUTIC EXERCISES: CPT | Performed by: PHYSICAL THERAPIST

## 2021-01-21 PROCEDURE — 97116 GAIT TRAINING THERAPY: CPT | Performed by: PHYSICAL THERAPIST

## 2021-01-21 PROCEDURE — 97530 THERAPEUTIC ACTIVITIES: CPT | Performed by: PHYSICAL THERAPIST

## 2021-01-26 ENCOUNTER — TREATMENT (OUTPATIENT)
Dept: PHYSICAL THERAPY | Facility: CLINIC | Age: 51
End: 2021-01-26

## 2021-01-26 DIAGNOSIS — R29.898 RIGHT LEG WEAKNESS: ICD-10-CM

## 2021-01-26 DIAGNOSIS — M25.551 RIGHT HIP PAIN: ICD-10-CM

## 2021-01-26 DIAGNOSIS — R26.9 GAIT DISTURBANCE: Primary | ICD-10-CM

## 2021-01-26 DIAGNOSIS — S72.144S CLOSED NONDISPLACED INTERTROCHANTERIC FRACTURE OF RIGHT FEMUR, SEQUELA: ICD-10-CM

## 2021-01-26 PROCEDURE — 97110 THERAPEUTIC EXERCISES: CPT | Performed by: PHYSICAL THERAPIST

## 2021-01-26 PROCEDURE — 97112 NEUROMUSCULAR REEDUCATION: CPT | Performed by: PHYSICAL THERAPIST

## 2021-01-26 PROCEDURE — 97140 MANUAL THERAPY 1/> REGIONS: CPT | Performed by: PHYSICAL THERAPIST

## 2021-01-26 NOTE — PATIENT INSTRUCTIONS
Access Code: [email or web address]   URL: [email or web address]   Date: 01/26/2021   Prepared by: Izabella Sainz     Exercises  Full Arc Quad - 20 reps - 5 hold - 1x daily - 7x weekly  Heel Toe Raises with Counter Support - 20 reps - 1x daily - 7x weekly  Standing March with Counter Support - 20 reps - 1x daily - 7x weekly  Standing Hip Abduction - 20 reps - 1x daily - 7x weekly  Standing Hip Extension with Counter Support - 20 reps - 1x daily - 7x weekly  Standing Knee Flexion AROM with Chair Support - 20 reps - 1x daily - 7x weekly  Sidestepping - 1x daily - 7x weekly  Long Sitting Quad Set with Towel Roll Under Heel - 10 reps - 5 hold - 1x daily - 7x weekly  Supine Heel Slide - 10 reps - 5 hold - 1x daily - 7x weekly  Supine Bridge - 10 reps - 3 sets - 1x daily - 7x weekly  Modified Hany Stretch - 10 reps - 3 sets - 1x daily - 7x weekly  Supine Hamstring Stretch with Strap - 10 reps - 3 sets - 1x daily - 7x weekly  Supine Figure 4 Piriformis Stretch - 3 reps - 10 hold - 1x daily - 7x weekly  Prone Hip Rotation AROM - 10 reps - 1x daily - 7x weekly  Sidelying Hip Abduction - 10 reps - 1x daily - 7x weekly  Seated Piriformis Stretch with Trunk Bend - 10 reps - 1x daily - 7x weekly  Standing Tandem Balance with Counter Support - 30 hold - 1x daily - 7x weekly  Single Leg Stance - 30 hold - 1x daily - 7x weekly  Sit to Stand without Arm Support - 10 reps - 1x daily - 7x weekly

## 2021-01-26 NOTE — PROGRESS NOTES
Physical Therapy Daily Progress Note      Patient: Simone Boykin   : 1970  Referring practitioner: Adalberto Solo MD  Date of Initial Visit: Type: THERAPY  Noted: 2020  Today's Date: 2021  Patient seen for 14 sessions         Simone Boykin reports: 0/10 R hip pain, only stiffness       Objective   See Exercise, Manual, and Modality Logs for complete treatment.       Assessment/Plan  Pt demonstrates improved hip extension with supine hip flexor stretch and addition of prone PA mobs and prone ER/IR. Pt able to complete gait without AD however still demonstrates decreased stance on RLE and wide ALEKSANDR due to impaired balance. Addition of single leg press this date to improve strength and decrease compensation from LLE. Pt continues to have difficulty with R hip rotation; mod figure 4 stretch in supine this date. Pt HEP code updated this date in preparation for discharge next session .     Anticipate DC next Visit       Timed:  Manual Therapy:    8     mins  84577;  Therapeutic Exercise:    34     mins  73634;     Neuromuscular Rosas:    12    mins  55339;    Therapeutic Activity:     -     mins  59998;     Gait Training:      -     mins  70638;     Ultrasound:     -     mins  01103;    Electrical Stimulation:    -     mins  73629 ( );    Untimed:  Electrical Stimulation:    -     mins  01513 ( );  Mechanical Traction:    -     mins  03239;     Timed Treatment:   54   mins   Total Treatment:     60   mins  Izabella Sainz PT  Physical Therapist

## 2021-01-28 ENCOUNTER — DOCUMENTATION (OUTPATIENT)
Dept: PHYSICAL THERAPY | Facility: CLINIC | Age: 51
End: 2021-01-28

## 2021-01-28 ENCOUNTER — TREATMENT (OUTPATIENT)
Dept: PHYSICAL THERAPY | Facility: CLINIC | Age: 51
End: 2021-01-28

## 2021-01-28 DIAGNOSIS — S72.144S CLOSED NONDISPLACED INTERTROCHANTERIC FRACTURE OF RIGHT FEMUR, SEQUELA: ICD-10-CM

## 2021-01-28 DIAGNOSIS — M25.551 RIGHT HIP PAIN: ICD-10-CM

## 2021-01-28 DIAGNOSIS — R26.9 GAIT DISTURBANCE: Primary | ICD-10-CM

## 2021-01-28 DIAGNOSIS — R29.898 RIGHT LEG WEAKNESS: ICD-10-CM

## 2021-01-28 PROCEDURE — 97110 THERAPEUTIC EXERCISES: CPT | Performed by: PHYSICAL THERAPIST

## 2021-01-28 PROCEDURE — 97530 THERAPEUTIC ACTIVITIES: CPT | Performed by: PHYSICAL THERAPIST

## 2021-01-28 NOTE — PROGRESS NOTES
Physical Therapy Daily Progress Note        Patient: Simone Boykin   : 1970  Diagnosis/ICD-10 Code:  Gait disturbance [R26.9]  Referring practitioner: Adalberto Solo MD  Date of Initial Visit: Type: THERAPY  Noted: 2020  Today's Date: 2021  Patient seen for 15 sessions         Simone Boykin reports: he is still sore from Tuesday.  He reported getting his (R) sock on is still sketchy at times, if he is tight.  He has pain and difficulty getting out of his car because it is a civic and low to the ground.  He can get out of his mom's SUV easily which is what he drives most often.      Subjective     Objective          Active Range of Motion     Right Hip   Flexion: 72 degrees   Abduction: 18 (supine) degrees   External rotation (90/90): 18 (seated) degrees   Internal rotation (90/90): 27 (seated) degrees     Strength/Myotome Testing     Right Hip   Planes of Motion   Flexion: 3+  Extension: 3-  Abduction: 3+  Adduction: 3-  External rotation: 3-  Internal rotation: 3-    Functional Assessment     Comments  Ascending/descending 6 in steps reciprocally using handrail; reported stairs at home are built wrong so he descends non-reciprocally    TUG=12 sec, no AD    5xSTS=9 sec      See Exercise, Manual, and Modality Logs for complete treatment.       Assessment/Plan  Pt with increased soreness this date which appeared to affect strength and ROM measurements however TUG and 5xSTS times improved indicating improved function. He also demonstrated improved gait mechanics and balance especially without AD.  He continued to demonstrate increased ALEKSANDR, decreased step length, decreased knee flexion and increased weight shift however decreased severity in all areas compared to last week.  Pt continued to tolerate exercises well and provided with min cues for technique.  Issued final HEP with no questions or concerns reported.      Discharged to final HEP.           Manual Therapy:         mins   18727;  Therapeutic Exercise:    15     mins  74183;     Neuromuscular Rosas:        mins  58940;    Therapeutic Activity:     20     mins  08974;     Gait Training:           mins  58284;     Ultrasound:          mins  74862;    Electrical Stimulation:         mins  76988 ( );  Dry Needling          mins self-pay    Timed Treatment:   35   mins   Total Treatment:     46   mins    Tammie Seay PTA  Physical Therapist Assistant

## 2021-01-28 NOTE — PATIENT INSTRUCTIONS
Access Code: [email or web address]  URL: [email or web address]  Date: 01/28/2021  Prepared by: Tammie Seay    Exercises  Full Arc Quad - 20 reps - 5 hold - 1x daily - 7x weekly  Heel Toe Raises with Counter Support - 20 reps - 1x daily - 7x weekly  Standing March with Counter Support - 20 reps - 1x daily - 7x weekly  Standing Hip Abduction - 20 reps - 1x daily - 7x weekly  Standing Hip Extension with Counter Support - 20 reps - 1x daily - 7x weekly  Standing Knee Flexion AROM with Chair Support - 20 reps - 1x daily - 7x weekly  Sidestepping - 1x daily - 7x weekly  Long Sitting Quad Set with Towel Roll Under Heel - 10 reps - 5 hold - 1x daily - 7x weekly  Supine Heel Slide - 10 reps - 5 hold - 1x daily - 7x weekly  Supine Bridge - 10 reps - 3 sets - 1x daily - 7x weekly  Modified Hany Stretch - 10 reps - 3 sets - 1x daily - 7x weekly  Supine Hamstring Stretch with Strap - 10 reps - 3 sets - 1x daily - 7x weekly  Supine Figure 4 Piriformis Stretch - 3 reps - 10 hold - 1x daily - 7x weekly  Prone Hip Rotation AROM - 10 reps - 1x daily - 7x weekly  Sidelying Hip Abduction - 10 reps - 1x daily - 7x weekly  Seated Piriformis Stretch with Trunk Bend - 10 reps - 1x daily - 7x weekly  Standing Tandem Balance with Counter Support - 30 hold - 1x daily - 7x weekly  Single Leg Stance - 30 hold - 1x daily - 7x weekly  Sit to Stand without Arm Support - 10 reps - 1x daily - 7x weekly

## 2021-01-28 NOTE — PROGRESS NOTES
Discharge Summary  Discharge Summary from Physical Therapy Report    Patient Information  Simone Boykin  1970    Dates  PT visit: 11/19/2020-1/28/2021  Number of Visits: 15     Discharge Status of Patient:    Ascending/descending 6 inch steps reciprocally using handrail; reported stairs at home are built wrong so he descends non-reciprocally     TUG=12 sec, no AD     5xSTS=9 sec     Simone Boykin is a 50 year-old male referred to physical therapy following R proximal femur fracture due to fall. He presents with a stable clinical presentation.  He has comorbidities of history of R knee surgery and injury due to playing rugby, anxiety/depression and personal factors of history of alcohol abuse and inability to drive that may affect his progress in the plan of care. He presents with healing incision with mild edema but no s/s of infection.  He has improved lateral and posterior hip tenderness, muscle weakness in hip and knee, and ROM in his hip/knee. Pt has progressed gait from RWx to SBQC to no AD with mild antalgic pattern needing wide ALEKSANDR for stability/balance. Pt demonstrates improving activity tolerance, endurance and LE strength however lacking proximal hip and core strength. Pt instructed to continue to ice and elevated BLE at the end of each day. Plan to discharge to HEP. During therapy, pt educated on anatomy, goal of interventions, sleeping position, initial HEP, importance of prone positioning to decrease hip flexor tightness, stretches to improve R hip rotation and body mechanics to promote healthy lifestyle and improve quality of life.     Prognosis: good  Functional Limitations: carrying objects, sleeping, walking, pushing, uncomfortable because of pain, moving in bed, sitting, standing, stooping and unable to perform repetitive tasks  Goals  Plan Goals: Plan Goals: STG x 4 weeks  1.  Patient will demonstrate increase hip IR >25 degrees.-PROGRESSING  2.  Pt will improve R knee AROM 5-120  degrees-PROGRESSING  3.  Patient will demonstrate increase in R hip strength to grossly 4/5-PROGRESSING  4.  Ambulate without AD with mild antalgic gait pattern-MET  5.  Ascend stairs in a reciprocal pattern with railing-Partially MET (non-reciprocal pattern descending)    LTG x 8 weeks  1.  Bilateral hip AROM with tolerable symptoms.-MET  2.  Squat with proper mechanics to pick something up off floor-MET  3.  Patient to don socks/shoes without pain-PROGRESSING mild pain with RLE and limited ER  4.  Decrease 5 time sit to/from stand time to 12 sec or less-MET (9 sec)  5.  In/out of car with minimal discomfort.-MET    Goals: Partially Met    Visit Diagnoses:    ICD-10-CM ICD-9-CM   1. Gait disturbance  R26.9 781.2   2. Right hip pain  M25.551 719.45   3. Closed nondisplaced intertrochanteric fracture of right femur, sequela  S72.144S 905.3   4. Right leg weakness  R29.898 729.89       Discharge Plan: Continue with current home exercise program as instructed    Comments: On pt's last day of treatment, pt presents with increased soreness limiting ROM and strength testing    Date of Discharge 1/28/2021    Izabella Sainz PT  Physical Therapist

## 2021-02-03 ENCOUNTER — TELEPHONE (OUTPATIENT)
Dept: ORTHOPEDIC SURGERY | Facility: CLINIC | Age: 51
End: 2021-02-03

## 2021-02-03 NOTE — TELEPHONE ENCOUNTER
Patient called asking for a refill on oxycodone, BUT, he did ask if you would give him loratab instead...

## 2021-02-03 NOTE — TELEPHONE ENCOUNTER
Cannot give him any narcotics, he is out of the 90-day global.    He can check with his primary care doc

## 2021-06-15 ENCOUNTER — OFFICE VISIT (OUTPATIENT)
Dept: FAMILY MEDICINE CLINIC | Facility: CLINIC | Age: 51
End: 2021-06-15

## 2021-06-15 VITALS
SYSTOLIC BLOOD PRESSURE: 152 MMHG | BODY MASS INDEX: 35.68 KG/M2 | HEIGHT: 72 IN | HEART RATE: 90 BPM | TEMPERATURE: 97.6 F | OXYGEN SATURATION: 97 % | DIASTOLIC BLOOD PRESSURE: 90 MMHG | WEIGHT: 263.4 LBS

## 2021-06-15 DIAGNOSIS — F10.920 ALCOHOLIC INTOXICATION WITHOUT COMPLICATION (HCC): ICD-10-CM

## 2021-06-15 DIAGNOSIS — M25.551 RIGHT HIP PAIN: Primary | ICD-10-CM

## 2021-06-15 DIAGNOSIS — Z02.89 PAIN MANAGEMENT CONTRACT SIGNED: ICD-10-CM

## 2021-06-15 PROCEDURE — 99214 OFFICE O/P EST MOD 30 MIN: CPT | Performed by: FAMILY MEDICINE

## 2021-06-15 NOTE — PROGRESS NOTES
Chief Complaint   Patient presents with   • Hip Pain     PT C/C OF PAIN AFTER SURGERY 09/2020   • Leg Pain       Subjective   Simone Boykin is a 50 y.o. male.     History of Present Illness   50-year-old male here for acute visit add on status post right hip intramedullary nail for treatment of intertrochanteric fracture, October 9, 2020. New patient to this provider    Last physical therapy appointment and orthopedics appointment was in January.  He is using a cane for assistance while walking.    He requests pain medication Lortabs or other narcotic prescription at this time. Uncontrolled and persistent pain.     He states prior to arrival he drank several beers and several shots of hard liquor/vodka.  He denies that this has affected his judgment.  He denies any other substance use.  He states that his mother drove him today and that he is safe at home and that he does not have any other concerns aside from R hip pain.  He denies any other safety concerns.  He states trying to cope with the right leg and hip pain. States alcohol and other substances are not an issue for him.     He is not currently on any prescription medications. Unsure of patients baseline as this is first visit.     The following portions of the patient's history were reviewed and updated as appropriate: allergies, current medications, past family history, past medical history, past social history, past surgical history and problem list.      Past Medical History:   Diagnosis Date   • Anxiety    • Arthritis     JUAN KNEES   • Depression    • Fall    • GERD (gastroesophageal reflux disease)    • Heartburn     ON OCC   • Mass     BASE OF POSTERIOR NECK   • Shoulder dislocation     LONG TERM ISSUES WITH       Past Surgical History:   Procedure Laterality Date   • EXCISION MASS HEAD/NECK N/A 4/20/2016    Procedure: MASS SOFT TISSUE EXCISION POSTERIOR NECK;  Surgeon: Crow Ayala Jr., MD;  Location: [facility];  Service:    • HIP  INTERTROCHANTERIC NAILING Right 10/9/2020    Procedure: HIP INTERTROCHANTERIC NAILING;  Surgeon: Adalberto Solo MD;  Location: [facility];  Service: Orthopedics;  Laterality: Right;   • KNEE ARTHROSCOPY Right        Family History   Problem Relation Age of Onset   • Heart disease Father    • Thyroid disease Brother        Social History     Socioeconomic History   • Marital status: Single     Spouse name: Not on file   • Number of children: Not on file   • Years of education: Not on file   • Highest education level: Not on file   Tobacco Use   • Smoking status: Never Smoker   • Smokeless tobacco: Never Used   Vaping Use   • Vaping Use: Never used   Substance and Sexual Activity   • Alcohol use: Yes     Comment: states no longer drinks daily   • Drug use: No   • Sexual activity: Defer       Current Outpatient Medications on File Prior to Visit   Medication Sig Dispense Refill   • Multiple Vitamins-Minerals (multivitamin and minerals) liquid liquid Take  by mouth Daily.     • Senexon-S 8.6-50 MG per tablet      • folic acid (FOLVITE) 1 MG tablet Take 1 mg by mouth Daily.     • oxyCODONE-acetaminophen (PERCOCET) 5-325 MG per tablet 1-2 tablets every 4-6 hours, prn moderate-severe pain 36 tablet 0   • thiamine (VITAMIN B1) 100 MG tablet Take 100 mg by mouth Daily.       No current facility-administered medications on file prior to visit.       Review of Systems   Constitutional: Negative for chills and fever.   HENT: Negative for congestion.    Eyes: Negative for blurred vision and pain.   Respiratory: Negative for cough, chest tightness and shortness of breath.    Cardiovascular: Negative for chest pain.   Gastrointestinal: Negative for abdominal pain, constipation and diarrhea.   Endocrine: Negative for cold intolerance and heat intolerance.   Genitourinary: Negative for decreased urine volume, dysuria and frequency.   Musculoskeletal: Positive for gait problem and joint swelling. Negative for arthralgias and back  pain.   Skin: Negative for rash and skin lesions.   Neurological: Negative for dizziness and confusion.   Psychiatric/Behavioral: Negative for agitation, behavioral problems and depressed mood.           Vitals:    06/15/21 1447   BP: 152/90   Pulse: 90   Temp: 97.6 °F (36.4 °C)   SpO2: 97%      Objective   Physical Exam  Vitals and nursing note reviewed.   Constitutional:       Appearance: He is well-developed.   HENT:      Head: Normocephalic.   Cardiovascular:      Rate and Rhythm: Normal rate and regular rhythm.      Heart sounds: Normal heart sounds. No murmur heard.     Pulmonary:      Effort: Pulmonary effort is normal. No respiratory distress.      Breath sounds: Normal breath sounds.   Abdominal:      Palpations: Abdomen is soft.   Musculoskeletal:      Cervical back: Neck supple.      Comments: Walks with cane   Skin:     General: Skin is warm and dry.   Neurological:      Mental Status: He is alert and oriented to person, place, and time.           Assessment/Plan   Problems Addressed this Visit     None      Visit Diagnoses     Right hip pain    -  Primary    Relevant Orders    Compliance Drug Analysis, Ur - Urine, Clean Catch    Pain management contract signed        Alcoholic intoxication without complication (CMS/[facility])          Diagnoses       Codes Comments    Right hip pain    -  Primary ICD-10-CM: M25.551  ICD-9-CM: 719.45     Pain management contract signed     ICD-10-CM: Z02.89  ICD-9-CM: V68.89     Alcoholic intoxication without complication (CMS/[facility])     ICD-10-CM: F10.920  ICD-9-CM: 305.00         New patient to this provider, but on further discussion does not appear to be at his baseline    -Instructed him would need to avoid any substance use for 24 hours and then to schedule an appointment so can discuss this further  -Would have him drink plenty of liquids, unable to write any controlled scripts, would like to f/u with him further and patient states he is agreeable to this plan     -Mother  driving the patient, concern for possible recent alcohol use per patient history but no overt safety concerns at this time as vitals and exam is stable at this time. Walking with cane.        Return if symptoms worsen or fail to improve.Instruct PCP f/u       Connie Sherwood MD

## 2021-06-17 ENCOUNTER — TELEPHONE (OUTPATIENT)
Dept: FAMILY MEDICINE CLINIC | Facility: CLINIC | Age: 51
End: 2021-06-17

## 2021-06-18 ENCOUNTER — HOSPITAL ENCOUNTER (EMERGENCY)
Facility: HOSPITAL | Age: 51
Discharge: HOME | End: 2021-06-18
Attending: EMERGENCY MEDICINE | Admitting: EMERGENCY MEDICINE

## 2021-06-18 VITALS
HEART RATE: 106 BPM | HEIGHT: 73 IN | SYSTOLIC BLOOD PRESSURE: 121 MMHG | WEIGHT: 250 LBS | OXYGEN SATURATION: 95 % | RESPIRATION RATE: 18 BRPM | TEMPERATURE: 97.4 F | DIASTOLIC BLOOD PRESSURE: 93 MMHG | BODY MASS INDEX: 33.13 KG/M2

## 2021-06-18 DIAGNOSIS — M79.604 CHRONIC PAIN OF RIGHT LOWER EXTREMITY: Primary | ICD-10-CM

## 2021-06-18 DIAGNOSIS — G89.29 CHRONIC PAIN OF RIGHT LOWER EXTREMITY: Primary | ICD-10-CM

## 2021-06-18 PROCEDURE — 99283 EMERGENCY DEPT VISIT LOW MDM: CPT

## 2021-06-18 PROCEDURE — 99283 EMERGENCY DEPT VISIT LOW MDM: CPT | Performed by: EMERGENCY MEDICINE

## 2021-06-18 NOTE — ED NOTES
Pt called his mother, she states she will come pick him up.      Rosalinda Huynh RN  06/18/21 0640

## 2021-06-18 NOTE — ED PROVIDER NOTES
"Kelly Boykin is a 50-year-old white male who presents secondary to right leg pain.  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  He has had chronic pain since his surgery which was in 2020.  Patient presents requesting pain medication.  Patient has been prescribed Percocet in the past.  He discovered yesterday that he no longer had a prescription for this pain medication.  Thus patient elected to come to the emergency room this morning for pain medication.  Patient denies any recent injury.      History provided by:  Patient      Review of Systems   Constitutional: Negative for fever.   HENT: Negative for rhinorrhea.    Eyes: Negative for redness.   Respiratory: Negative for cough.    Cardiovascular: Negative for chest pain.   Gastrointestinal: Negative for abdominal pain.   Genitourinary: Negative for dysuria.   Musculoskeletal: Positive for arthralgias (Chronic right lower extremity pain). Negative for back pain.   Skin: Negative for rash.   Neurological: Negative for syncope.   All other systems reviewed and are negative.      Past Medical History:   Diagnosis Date   • Anxiety    • Arthritis     JUAN KNEES   • Depression    • Fall    • GERD (gastroesophageal reflux disease)    • Heartburn     ON OCC   • Mass     BASE OF POSTERIOR NECK   • Shoulder dislocation     LONG TERM ISSUES WITH       Allergies   Allergen Reactions   • Melatonin Delirium       Past Surgical History:   Procedure Laterality Date   • EXCISION MASS HEAD/NECK N/A 4/20/2016    Procedure: MASS SOFT TISSUE EXCISION POSTERIOR NECK;  Surgeon: Crow Ayala Jr., MD;  Location: [facility] OR;  Service:    • HIP INTERTROCHANTERIC NAILING Right 10/9/2020    Procedure: HIP INTERTROCHANTERIC NAILING;  Surgeon: Adalberto Solo MD;  Location: [facility] OR;  Service: Orthopedics;  Laterality: Right;   • KNEE ARTHROSCOPY Right        Family History   Problem Relation Age of Onset   • Heart disease Father    • Thyroid disease " Brother        Social History     Socioeconomic History   • Marital status: Single     Spouse name: Not on file   • Number of children: Not on file   • Years of education: Not on file   • Highest education level: Not on file   Tobacco Use   • Smoking status: Never Smoker   • Smokeless tobacco: Never Used   Vaping Use   • Vaping Use: Never used   Substance and Sexual Activity   • Alcohol use: Yes   • Drug use: No   • Sexual activity: Defer           Objective   Physical Exam  Vitals and nursing note reviewed.   Constitutional:       General: He is not in acute distress.     Appearance: Normal appearance. He is obese. He is not ill-appearing, toxic-appearing or diaphoretic.      Comments: 50-year-old white male laying in bed.  Patient is obese.  Vital signs notable for heart rate of 106 otherwise unremarkable.  Patient angry and belligerent.   HENT:      Head: Normocephalic and atraumatic.      Right Ear: External ear normal.      Left Ear: External ear normal.      Nose: Nose normal.      Mouth/Throat:      Mouth: Mucous membranes are moist.   Cardiovascular:      Rate and Rhythm: Normal rate and regular rhythm.      Pulses: Normal pulses.      Heart sounds: Normal heart sounds. No murmur heard.   No friction rub. No gallop.    Pulmonary:      Effort: Pulmonary effort is normal.      Breath sounds: Normal breath sounds.   Abdominal:      General: Bowel sounds are normal. There is no distension.      Palpations: Abdomen is soft.      Tenderness: There is no abdominal tenderness. There is no guarding or rebound.   Musculoskeletal:      Cervical back: Normal range of motion.      Comments: Spontaneous movement in all extremities.  No evidence of acute injury.  2+ DP pulses bilaterally   Skin:     General: Skin is warm and dry.   Neurological:      General: No focal deficit present.      Mental Status: He is alert and oriented to person, place, and time.   Psychiatric:         Mood and Affect: Affect is angry.          Behavior: Behavior is aggressive.         Procedures           ED Course  ED Course as of Jun 18 0631 Fri Jun 18, 2021   0619 Patient here requesting narcotic pain medicine.  I explained to him that I do not treat chronic pain.  This is not the function of the emergency room.  Patient at one point cursed at me and began yelling.  I informed her that that was not proper behavior.  I will give the patient pain management information.  He can call later today to arrange an appointment.    [SS]      ED Course User Index  [SS] Paco Monte MD      My diagnosis for lower extremity pain and injury includes but is not limited to hip fracture, femur fracture, hip dislocation, hip contusion, hip sprain, hip strain, pelvic fracture, knee sprain, patella dislocation, knee dislocation, internal derangement of knee, fractures of the femur, tibia, fibula, ankle, foot and digits, ankle sprain, ankle dislocation, Lisfranc fracture, fracture dislocations of the digits, pulmonary embolism, claudication, peripheral vascular disease, gout, osteoarthritis, rheumatoid arthritis, bursitis, septic joint, poly-rheumatica, polyarthralgia and other inflammatory or infectious disease processes.                                       MDM    Final diagnoses:   Chronic pain of right lower extremity       ED Disposition  ED Disposition     ED Disposition Condition Comment    Discharge Stable           Sukumar Berger MD  [address] [address]  [phone]    Call   9: 00 a.m. today to arrange follow-up as soon as possible for your chronic pain issues.         Medication List      No changes were made to your prescriptions during this visit.          Paco Monte MD  06/18/21 0612

## 2021-06-18 NOTE — ED NOTES
"Pt arrives to ED via EMS c/o right leg pain. Pt states he had hip surgery back in September. Pt states he went to Dr. Sherwood two days ago and, \"she was gonna call me in Percocet but she never did. I went to the pharmacy and she never called it in for me.\" Pt is slurring his words and states he is a daily drinker, when asked what he drinks he said, \"smirnoff for the pain.\" Pt does appears intoxicated at this time although he states his last drink was, \"sometime last night.\" Pt can not provide a more specific time. Pt states his mother will be his ride home when he is discharged.      Rosalinda Huynh RN  06/18/21 0620    "

## 2021-06-18 NOTE — DISCHARGE INSTRUCTIONS
Continue current medications.  You may supplement your current medicines with ibuprofen.  Follow-up with pain management as above.  Return to ED for medical emergencies.
